# Patient Record
Sex: FEMALE | Race: BLACK OR AFRICAN AMERICAN | Employment: PART TIME | URBAN - METROPOLITAN AREA
[De-identification: names, ages, dates, MRNs, and addresses within clinical notes are randomized per-mention and may not be internally consistent; named-entity substitution may affect disease eponyms.]

---

## 2017-07-09 ENCOUNTER — HOSPITAL ENCOUNTER (EMERGENCY)
Age: 27
Discharge: HOME | End: 2017-07-09
Attending: EMERGENCY MEDICINE
Payer: COMMERCIAL

## 2017-07-09 VITALS
TEMPERATURE: 98.9 F | OXYGEN SATURATION: 99 % | HEART RATE: 98 BPM | HEIGHT: 67 IN | DIASTOLIC BLOOD PRESSURE: 89 MMHG | RESPIRATION RATE: 16 BRPM | BODY MASS INDEX: 39.24 KG/M2 | SYSTOLIC BLOOD PRESSURE: 138 MMHG | WEIGHT: 250 LBS

## 2017-07-09 DIAGNOSIS — L29.9 ITCHING: ICD-10-CM

## 2017-07-09 DIAGNOSIS — Z91.040 LATEX ALLERGY: Primary | ICD-10-CM

## 2017-07-09 PROCEDURE — 6370000000 HC RX 637 (ALT 250 FOR IP): Performed by: PHYSICIAN ASSISTANT

## 2017-07-09 PROCEDURE — G0382 LEV 3 HOSP TYPE B ED VISIT: HCPCS

## 2017-07-09 RX ORDER — DIPHENHYDRAMINE HCL 25 MG
25 CAPSULE ORAL EVERY 4 HOURS PRN
Qty: 15 CAPSULE | Refills: 0 | Status: SHIPPED | OUTPATIENT
Start: 2017-07-09 | End: 2020-08-11 | Stop reason: SDUPTHER

## 2017-07-09 RX ORDER — LORATADINE 10 MG/1
10 TABLET ORAL DAILY
Qty: 30 TABLET | Refills: 0 | Status: SHIPPED | OUTPATIENT
Start: 2017-07-09 | End: 2017-09-11

## 2017-07-09 RX ORDER — DIAPER,BRIEF,INFANT-TODD,DISP
EACH MISCELLANEOUS
Qty: 1 TUBE | Refills: 1 | Status: SHIPPED | OUTPATIENT
Start: 2017-07-09 | End: 2017-07-16

## 2017-07-09 RX ORDER — DIPHENHYDRAMINE HCL 25 MG
25 TABLET ORAL ONCE
Status: COMPLETED | OUTPATIENT
Start: 2017-07-09 | End: 2017-07-09

## 2017-07-09 RX ADMIN — DIPHENHYDRAMINE HCL 25 MG: 25 TABLET ORAL at 15:28

## 2017-07-09 ASSESSMENT — ENCOUNTER SYMPTOMS
ABDOMINAL PAIN: 0
BACK PAIN: 0
TROUBLE SWALLOWING: 0
COUGH: 0
VOMITING: 0
NAUSEA: 0
COLOR CHANGE: 0
SHORTNESS OF BREATH: 0
WHEEZING: 0
DIARRHEA: 0

## 2017-09-11 ENCOUNTER — HOSPITAL ENCOUNTER (EMERGENCY)
Age: 27
Discharge: HOME | End: 2017-09-11
Attending: EMERGENCY MEDICINE
Payer: COMMERCIAL

## 2017-09-11 VITALS
WEIGHT: 287.2 LBS | TEMPERATURE: 99.2 F | OXYGEN SATURATION: 100 % | BODY MASS INDEX: 45.08 KG/M2 | RESPIRATION RATE: 20 BRPM | DIASTOLIC BLOOD PRESSURE: 67 MMHG | SYSTOLIC BLOOD PRESSURE: 137 MMHG | HEART RATE: 80 BPM | HEIGHT: 67 IN

## 2017-09-11 DIAGNOSIS — J06.9 VIRAL URI WITH COUGH: Primary | ICD-10-CM

## 2017-09-11 PROCEDURE — 99282 EMERGENCY DEPT VISIT SF MDM: CPT

## 2017-09-11 RX ORDER — LORATADINE 10 MG/1
10 TABLET ORAL DAILY
Qty: 30 TABLET | Refills: 0 | Status: SHIPPED | OUTPATIENT
Start: 2017-09-11 | End: 2019-11-25 | Stop reason: ALTCHOICE

## 2017-09-11 RX ORDER — ALBUTEROL SULFATE 90 UG/1
2 AEROSOL, METERED RESPIRATORY (INHALATION) EVERY 6 HOURS PRN
Qty: 1 INHALER | Refills: 1 | Status: SHIPPED | OUTPATIENT
Start: 2017-09-11 | End: 2020-08-11 | Stop reason: SDUPTHER

## 2017-09-11 RX ORDER — GUAIFENESIN 600 MG/1
1200 TABLET, EXTENDED RELEASE ORAL 2 TIMES DAILY
Qty: 30 TABLET | Refills: 0 | Status: SHIPPED | OUTPATIENT
Start: 2017-09-11 | End: 2020-08-11

## 2017-09-11 RX ORDER — OXYMETAZOLINE HYDROCHLORIDE 0.05 G/100ML
2 SPRAY NASAL 2 TIMES DAILY
Qty: 1 BOTTLE | Refills: 0 | Status: SHIPPED | OUTPATIENT
Start: 2017-09-11 | End: 2017-10-11

## 2017-09-11 RX ORDER — IBUPROFEN 800 MG/1
800 TABLET ORAL EVERY 6 HOURS PRN
Qty: 120 TABLET | Refills: 1 | Status: SHIPPED | OUTPATIENT
Start: 2017-09-11 | End: 2018-12-26

## 2017-09-11 ASSESSMENT — ENCOUNTER SYMPTOMS
STRIDOR: 0
COUGH: 1
ABDOMINAL PAIN: 0
WHEEZING: 0
APNEA: 0
ABDOMINAL DISTENTION: 0
SHORTNESS OF BREATH: 0

## 2018-03-11 ENCOUNTER — HOSPITAL ENCOUNTER (EMERGENCY)
Age: 28
Discharge: HOME | End: 2018-03-11
Attending: EMERGENCY MEDICINE
Payer: COMMERCIAL

## 2018-03-11 VITALS
DIASTOLIC BLOOD PRESSURE: 81 MMHG | BODY MASS INDEX: 45.42 KG/M2 | OXYGEN SATURATION: 100 % | SYSTOLIC BLOOD PRESSURE: 131 MMHG | HEART RATE: 96 BPM | WEIGHT: 290 LBS | TEMPERATURE: 97.7 F | RESPIRATION RATE: 16 BRPM

## 2018-03-11 DIAGNOSIS — N76.0 ABSCESS OF VAGINA: Primary | ICD-10-CM

## 2018-03-11 PROCEDURE — 6370000000 HC RX 637 (ALT 250 FOR IP): Performed by: EMERGENCY MEDICINE

## 2018-03-11 PROCEDURE — 56405 I&D VULVA/PERINEAL ABSCESS: CPT

## 2018-03-11 PROCEDURE — 99283 EMERGENCY DEPT VISIT LOW MDM: CPT

## 2018-03-11 RX ORDER — SULFAMETHOXAZOLE AND TRIMETHOPRIM 800; 160 MG/1; MG/1
1 TABLET ORAL 2 TIMES DAILY
Qty: 14 TABLET | Refills: 0 | Status: SHIPPED | OUTPATIENT
Start: 2018-03-11 | End: 2018-03-18

## 2018-03-11 RX ORDER — HYDROCODONE BITARTRATE AND ACETAMINOPHEN 5; 325 MG/1; MG/1
1 TABLET ORAL EVERY 6 HOURS PRN
Qty: 10 TABLET | Refills: 0 | Status: SHIPPED | OUTPATIENT
Start: 2018-03-11 | End: 2018-03-18

## 2018-03-11 RX ORDER — CEPHALEXIN 500 MG/1
500 CAPSULE ORAL 4 TIMES DAILY
Qty: 28 CAPSULE | Refills: 0 | Status: SHIPPED | OUTPATIENT
Start: 2018-03-11 | End: 2018-03-18

## 2018-03-11 RX ORDER — CEPHALEXIN 500 MG/1
500 CAPSULE ORAL ONCE
Status: COMPLETED | OUTPATIENT
Start: 2018-03-11 | End: 2018-03-11

## 2018-03-11 RX ORDER — SULFAMETHOXAZOLE AND TRIMETHOPRIM 800; 160 MG/1; MG/1
1 TABLET ORAL ONCE
Status: COMPLETED | OUTPATIENT
Start: 2018-03-11 | End: 2018-03-11

## 2018-03-11 RX ADMIN — SULFAMETHOXAZOLE AND TRIMETHOPRIM 1 TABLET: 800; 160 TABLET ORAL at 19:45

## 2018-03-11 RX ADMIN — CEPHALEXIN 500 MG: 500 CAPSULE ORAL at 19:45

## 2018-03-11 ASSESSMENT — ENCOUNTER SYMPTOMS
DIARRHEA: 0
SORE THROAT: 0
NAUSEA: 0
BACK PAIN: 0
SHORTNESS OF BREATH: 0
ABDOMINAL PAIN: 0
VOMITING: 0
CONSTIPATION: 0
BLOOD IN STOOL: 0
SINUS PRESSURE: 0
COUGH: 0
RHINORRHEA: 0

## 2018-03-11 ASSESSMENT — PAIN DESCRIPTION - PAIN TYPE: TYPE: ACUTE PAIN

## 2018-03-11 ASSESSMENT — PAIN DESCRIPTION - LOCATION: LOCATION: LABIA

## 2018-03-11 ASSESSMENT — PAIN SCALES - GENERAL: PAINLEVEL_OUTOF10: 10

## 2018-03-11 ASSESSMENT — PAIN DESCRIPTION - ORIENTATION: ORIENTATION: RIGHT

## 2018-03-11 NOTE — ED PROVIDER NOTES
[facility] ED  Emergency Department Encounter  Emergency Medicine Resident     Pt Name: Marta Borjas  MRN: [id number]  [place] 1990  Date of evaluation: 3/11/18  PCP:  Mariposa Carrillo MD    CHIEF COMPLAINT       Chief Complaint   Patient presents with    Skin Problem     right labial sore. HISTORY OF PRESENT ILLNESS  (Location/Symptom, Timing/Onset, Context/Setting, Quality, Duration, Modifying Factors, Severity.)      Marta Borjas is a 33 y.o. female who presents with Vaginal swelling. Patient states that she has a painful swelling on the right inferior side of her vagina doesn't present for 3 days. Patient states that it has been increasing in size over that time. Patient denies history of current symptoms. Patient states that she's tried to put hot towels on the swelling but is not relieved the symptoms. Patient states that there has been no drainage from the swelling. Patient states that she is not sexually active. Patient denies fever, chills, nausea, vomiting, abdominal pain him a vaginal discharge or bleeding, dysuria, hematuria. PAST MEDICAL / SURGICAL / SOCIAL / FAMILY HISTORY      has a past medical history of Asthma; Bipolar disorder, mixed ([address] Cannabis abuse; Depression; Diabetes mellitus ([facility] [address] Obesity; and Polycystic ovarian disease. has a past surgical history that includes Tonsillectomy and adenoidectomy and [place] tooth extraction. Social History     Social History    Marital status: Single     Spouse name: N/A    Number of children: N/A    Years of education: N/A     Occupational History    Not on file.      Social History Main Topics    Smoking status: Current Every Day Smoker     Packs/day: 0.25     Years: 3.00     Types: Cigarettes    Smokeless tobacco: Never Used      Comment: Accepting of Nicotine replacement: Patch     Alcohol use No      Comment: socially    Drug use: Yes     Types: Marijuana    Sexual activity: Not Currently     Partners: Female     Other Topics Concern    Not on file     Social History Narrative    No narrative on file       Family History   Problem Relation Age of Onset    High Blood Pressure Mother     Diabetes Maternal Grandfather     High Blood Pressure Maternal Grandfather     Schizophrenia Maternal Aunt     Bipolar Disorder Maternal Aunt     Schizophrenia Maternal Aunt        Allergies:  Latex    Home Medications:  Prior to Admission medications    Medication Sig Start Date End Date Taking? Authorizing Provider   cephALEXin (KEFLEX) 500 MG capsule Take 1 capsule by mouth 4 times daily for 7 days 3/11/18 3/18/18 Yes Nick Bauer,    sulfamethoxazole-trimethoprim (BACTRIM DS) 800-160 MG per tablet Take 1 tablet by mouth 2 times daily for 7 days 3/11/18 3/18/18 Yes Nick Bauer DO   HYDROcodone-acetaminophen (NORCO) 5-325 MG per tablet Take 1 tablet by mouth every 6 hours as needed for Pain for up to 7 days.  3/11/18 3/18/18 Yes Nick Bauer DO   albuterol sulfate HFA (VENTOLIN HFA) 108 (90 Base) MCG/ACT inhaler Inhale 2 puffs into the lungs every 6 hours as needed for Wheezing 9/11/17  Yes Alison Cardona MD   FLUoxetine (PROZAC) 40 MG capsule Take 1 capsule by mouth daily 1/8/17  Yes Robbie No MD   ziprasidone (GEODON) 40 MG capsule Take 1 capsule by mouth 2 times daily (with meals) 1/8/17  Yes Robbie No MD   traZODone (DESYREL) 50 MG tablet Take 1 tablet by mouth nightly as needed for Sleep 1/8/17  Yes Robbie No MD   ibuprofen (ADVIL;MOTRIN) 800 MG tablet Take 1 tablet by mouth every 6 hours as needed for Pain 9/11/17   Alison Cardona MD   loratadine (CLARITIN) 10 MG tablet Take 1 tablet by mouth daily 9/11/17   Alison Cardona MD   guaiFENesin [facility]) 600 MG extended release tablet Take 2 tablets by mouth 2 times daily 9/11/17   Alison Cardona MD   diphenhydrAMINE (BENADRYL) 25 MG capsule Take 1 capsule by mouth every 4 hours as needed for Itching 7/9/17 Lesion spontaneously drained more laterally with purulent discharge. A third incision was made where spontaneous drainage was. CONSULTS:  None    CRITICAL CARE:  None    FINAL IMPRESSION      1. Abscess of vagina          DISPOSITION / PLAN     DISPOSITION Decision To Discharge 03/11/2018 07:29:29 PM      PATIENT REFERRED TO:  [facility]  [address]  [facility] [address]  [phone]  Call in 1 week  for follow up. DISCHARGE MEDICATIONS:  Discharge Medication List as of 3/11/2018  7:32 PM      START taking these medications    Details   cephALEXin (KEFLEX) 500 MG capsule Take 1 capsule by mouth 4 times daily for 7 days, Disp-28 capsule, R-0Print      sulfamethoxazole-trimethoprim (BACTRIM DS) 800-160 MG per tablet Take 1 tablet by mouth 2 times daily for 7 days, Disp-14 tablet, R-0Print      HYDROcodone-acetaminophen (NORCO) 5-325 MG per tablet Take 1 tablet by mouth every 6 hours as needed for Pain for up to 7 days. , Disp-10 tablet, R-0Print             Rayray Loredo DO  Emergency Medicine Resident    (Please note that portions of this note were completed with a voice recognition program.  Efforts were made to edit the dictations but occasionally words are mis-transcribed.)     Rayray Loredo DO  Resident  03/11/18 2026

## 2018-03-11 NOTE — ED NOTES
Pt is ambulatory with steady gait to room 21. Pt reports sore to right labia for a few days. Pt denies vaginal discharge. She states that she's not sexually active. Pt is alert, oriented, speaking in full sentences.       Celester Goltz, RN  03/11/18 4457

## 2018-05-26 ENCOUNTER — HOSPITAL ENCOUNTER (EMERGENCY)
Age: 28
Discharge: HOME | End: 2018-05-26
Attending: EMERGENCY MEDICINE
Payer: COMMERCIAL

## 2018-05-26 ENCOUNTER — APPOINTMENT (OUTPATIENT)
Dept: GENERAL RADIOLOGY | Age: 28
End: 2018-05-26
Payer: COMMERCIAL

## 2018-05-26 VITALS
DIASTOLIC BLOOD PRESSURE: 101 MMHG | HEIGHT: 65 IN | OXYGEN SATURATION: 99 % | WEIGHT: 250 LBS | TEMPERATURE: 97.9 F | BODY MASS INDEX: 41.65 KG/M2 | HEART RATE: 98 BPM | RESPIRATION RATE: 16 BRPM | SYSTOLIC BLOOD PRESSURE: 136 MMHG

## 2018-05-26 DIAGNOSIS — J40 BRONCHITIS: Primary | ICD-10-CM

## 2018-05-26 DIAGNOSIS — R05.9 COUGH: ICD-10-CM

## 2018-05-26 PROCEDURE — 99283 EMERGENCY DEPT VISIT LOW MDM: CPT

## 2018-05-26 PROCEDURE — 71046 X-RAY EXAM CHEST 2 VIEWS: CPT

## 2018-05-26 RX ORDER — GUAIFENESIN, PSEUDOEPHEDRINE HYDROCHLORIDE 600; 60 MG/1; MG/1
1 TABLET, EXTENDED RELEASE ORAL EVERY 12 HOURS
Qty: 14 TABLET | Refills: 0 | Status: SHIPPED | OUTPATIENT
Start: 2018-05-26 | End: 2018-06-02

## 2018-05-26 RX ORDER — AZITHROMYCIN 250 MG/1
TABLET, FILM COATED ORAL
Qty: 1 PACKET | Refills: 0 | Status: SHIPPED | OUTPATIENT
Start: 2018-05-26 | End: 2018-06-05

## 2018-05-26 RX ORDER — FLUTICASONE PROPIONATE 50 MCG
1 SPRAY, SUSPENSION (ML) NASAL 2 TIMES DAILY
Qty: 1 BOTTLE | Refills: 0 | Status: SHIPPED | OUTPATIENT
Start: 2018-05-26 | End: 2020-08-11 | Stop reason: SDUPTHER

## 2018-05-26 RX ORDER — BENZONATATE 100 MG/1
100 CAPSULE ORAL 3 TIMES DAILY PRN
Qty: 30 CAPSULE | Refills: 0 | Status: SHIPPED | OUTPATIENT
Start: 2018-05-26 | End: 2018-06-02

## 2018-07-03 ENCOUNTER — HOSPITAL ENCOUNTER (EMERGENCY)
Age: 28
Discharge: HOME | End: 2018-07-03
Attending: EMERGENCY MEDICINE
Payer: COMMERCIAL

## 2018-07-03 VITALS
SYSTOLIC BLOOD PRESSURE: 169 MMHG | HEIGHT: 67 IN | WEIGHT: 230 LBS | RESPIRATION RATE: 19 BRPM | DIASTOLIC BLOOD PRESSURE: 87 MMHG | BODY MASS INDEX: 36.1 KG/M2 | OXYGEN SATURATION: 100 % | TEMPERATURE: 99.2 F | HEART RATE: 98 BPM

## 2018-07-03 DIAGNOSIS — R03.0 ELEVATED BLOOD-PRESSURE READING WITHOUT DIAGNOSIS OF HYPERTENSION: ICD-10-CM

## 2018-07-03 DIAGNOSIS — R07.81 PLEURITIC CHEST PAIN: ICD-10-CM

## 2018-07-03 DIAGNOSIS — J20.9 ACUTE BRONCHITIS, UNSPECIFIED ORGANISM: Primary | ICD-10-CM

## 2018-07-03 DIAGNOSIS — J45.41 MODERATE PERSISTENT ASTHMA WITH EXACERBATION: ICD-10-CM

## 2018-07-03 DIAGNOSIS — J06.9 ACUTE UPPER RESPIRATORY INFECTION: ICD-10-CM

## 2018-07-03 PROCEDURE — 6370000000 HC RX 637 (ALT 250 FOR IP): Performed by: PHYSICIAN ASSISTANT

## 2018-07-03 PROCEDURE — 99283 EMERGENCY DEPT VISIT LOW MDM: CPT

## 2018-07-03 RX ORDER — PREDNISONE 20 MG/1
60 TABLET ORAL ONCE
Status: COMPLETED | OUTPATIENT
Start: 2018-07-03 | End: 2018-07-03

## 2018-07-03 RX ORDER — PREDNISONE 50 MG/1
50 TABLET ORAL DAILY
Qty: 4 TABLET | Refills: 0 | Status: SHIPPED | OUTPATIENT
Start: 2018-07-03 | End: 2019-07-15

## 2018-07-03 RX ORDER — ALBUTEROL SULFATE 90 UG/1
1-2 AEROSOL, METERED RESPIRATORY (INHALATION) EVERY 4 HOURS PRN
Qty: 1 INHALER | Refills: 0 | Status: SHIPPED | OUTPATIENT
Start: 2018-07-03 | End: 2020-08-11

## 2018-07-03 RX ADMIN — PREDNISONE 60 MG: 20 TABLET ORAL at 18:32

## 2018-07-03 ASSESSMENT — ENCOUNTER SYMPTOMS
ABDOMINAL PAIN: 0
WHEEZING: 0
EYE ITCHING: 0
NAUSEA: 0
EYE PAIN: 0
CONSTIPATION: 0
DIARRHEA: 0
VOMITING: 0
VOICE CHANGE: 0
RHINORRHEA: 1
COUGH: 1
SHORTNESS OF BREATH: 1
EYE DISCHARGE: 0
SINUS PAIN: 0
ABDOMINAL DISTENTION: 0
SINUS PRESSURE: 1
CHEST TIGHTNESS: 0
APNEA: 0
SORE THROAT: 0

## 2018-07-03 NOTE — ED PROVIDER NOTES
[facility] ED  Emergency Department Encounter  Emergency Medicine Resident     Pt Name: Kenny Clancy  MRN: [id number]  [place] 1990  Date of evaluation: 7/3/18  PCP:  Hannah Montenegro MD    [address] [place] [address]       Chief Complaint   Patient presents with    Cough     green production x4 days with congestion        HISTORY OF PRESENT ILLNESS  (Location/Symptom, Timing/Onset, Context/Setting, Quality, Duration, Modifying Factors, Severity.)      Kenny Clancy is a 33 y.o. female who presents with Productive cough, nasal and sinus congestion x 4-5 days. Patient reports occasional yellow-green sputum, without hemoptysis. She has occasional shortness of breath which is relieved with her albuterol inhaler she also reports anterior chest wall pain that is present with coughing. She typically uses her albuterol inhaler for exercise-induced asthma symptoms, but states that she has used it 3 times a day last time being 2 hours prior to arrival.  She denies shortness of breath currently and furthermore states that her symptoms are not similar to her prior asthma exacerbations. She denies any sore throat, fever, chills, weakness, palpitations, headache, nausea, vomiting, or diarrhea. She notes positive sick contacts at home with similar symptoms. She has not taken any over-the-counter or prescribed medications for her symptoms. She also notes that she needs a work note so that she doesn't have to work tomorrow. PAST MEDICAL / SURGICAL / SOCIAL / FAMILY HISTORY      has a past medical history of Asthma; Bipolar disorder, mixed ([address] Cannabis abuse; Depression; Diabetes mellitus ([address] Obesity; and Polycystic ovarian disease. has a past surgical history that includes Tonsillectomy and adenoidectomy and [place] tooth extraction.     Social History     Social History    Marital status: Single     Spouse name: N/A    Number of children: N/A    Years of education: N/A     Occupational 25 MG capsule Take 1 capsule by mouth every 4 hours as needed for Itching 7/9/17   Heather Gonzalez PA-C   FLUoxetine (PROZAC) 40 MG capsule Take 1 capsule by mouth daily 1/8/17   Tobias Daley MD   nicotine polacrilex (NICORETTE) 2 MG gum Take 1 each by mouth as needed for Smoking cessation 1/8/17   Tobias Daley MD   ziprasidone (GEODON) 40 MG capsule Take 1 capsule by mouth 2 times daily (with meals) 1/8/17   Tobias Daley MD   traZODone (DESYREL) 50 MG tablet Take 1 tablet by mouth nightly as needed for Sleep 1/8/17   Tobias Daley MD       REVIEW OF SYSTEMS    (2-9 systems for level 4, 10 or more for level 5)      Review of Systems   Constitutional: Negative for activity change, appetite change, chills and diaphoresis. HENT: Positive for congestion, postnasal drip, rhinorrhea, sinus pressure and sneezing. Negative for ear discharge, mouth sores, nosebleeds, sinus pain, sore throat and voice change. Eyes: Negative for pain, discharge and itching. Respiratory: Positive for cough and shortness of breath. Negative for apnea, chest tightness and wheezing. Cardiovascular: Positive for chest pain. Negative for palpitations and leg swelling. Gastrointestinal: Negative for abdominal distention, abdominal pain, constipation, diarrhea, nausea and vomiting. Musculoskeletal: Negative for arthralgias. All other systems reviewed and are negative. PHYSICAL EXAM   (up to 7 for level 4, 8 or more for level 5)      INITIAL VITALS:   ED Triage Vitals   BP Temp Temp Source Pulse Resp SpO2 Height Weight   07/03/18 1706 07/03/18 1704 07/03/18 1704 07/03/18 1704 07/03/18 1704 07/03/18 1704 07/03/18 1704 07/03/18 1704   (!) 169/87 99.2 °F (37.3 °C) Oral 98 19 100 % 5' 7\" (1.702 m) 230 lb (104.3 kg)       Physical Exam   Constitutional: She is oriented to person, place, and time. She appears well-developed. No distress. Morbidly obese   HENT:   Head: Normocephalic and atraumatic.    Right Ear: External ear normal.   Left

## 2018-11-30 ENCOUNTER — OFFICE VISIT (OUTPATIENT)
Dept: INTERNAL MEDICINE | Age: 28
End: 2018-11-30
Payer: COMMERCIAL

## 2018-11-30 VITALS
BODY MASS INDEX: 45.99 KG/M2 | DIASTOLIC BLOOD PRESSURE: 94 MMHG | WEIGHT: 293 LBS | HEIGHT: 67 IN | SYSTOLIC BLOOD PRESSURE: 138 MMHG | HEART RATE: 107 BPM

## 2018-11-30 DIAGNOSIS — I10 ESSENTIAL HYPERTENSION: ICD-10-CM

## 2018-11-30 DIAGNOSIS — R73.03 PREDIABETES: ICD-10-CM

## 2018-11-30 DIAGNOSIS — J45.20 MILD INTERMITTENT ASTHMA WITHOUT COMPLICATION: ICD-10-CM

## 2018-11-30 DIAGNOSIS — Z00.00 HEALTH CARE MAINTENANCE: ICD-10-CM

## 2018-11-30 DIAGNOSIS — Z13.220 SCREENING FOR HYPERLIPIDEMIA: ICD-10-CM

## 2018-11-30 DIAGNOSIS — G47.33 OSA (OBSTRUCTIVE SLEEP APNEA): Primary | ICD-10-CM

## 2018-11-30 DIAGNOSIS — E11.9 TYPE 2 DIABETES MELLITUS WITHOUT COMPLICATION, WITHOUT LONG-TERM CURRENT USE OF INSULIN (HCC): ICD-10-CM

## 2018-11-30 DIAGNOSIS — E66.01 CLASS 3 SEVERE OBESITY DUE TO EXCESS CALORIES WITHOUT SERIOUS COMORBIDITY WITH BODY MASS INDEX (BMI) OF 50.0 TO 59.9 IN ADULT (HCC): ICD-10-CM

## 2018-11-30 LAB — HBA1C MFR BLD: 6 %

## 2018-11-30 PROCEDURE — 99214 OFFICE O/P EST MOD 30 MIN: CPT | Performed by: INTERNAL MEDICINE

## 2018-11-30 PROCEDURE — 83036 HEMOGLOBIN GLYCOSYLATED A1C: CPT | Performed by: INTERNAL MEDICINE

## 2018-11-30 PROCEDURE — G8484 FLU IMMUNIZE NO ADMIN: HCPCS | Performed by: INTERNAL MEDICINE

## 2018-11-30 PROCEDURE — 3044F HG A1C LEVEL LT 7.0%: CPT | Performed by: INTERNAL MEDICINE

## 2018-11-30 PROCEDURE — G8417 CALC BMI ABV UP PARAM F/U: HCPCS | Performed by: INTERNAL MEDICINE

## 2018-11-30 PROCEDURE — 99211 OFF/OP EST MAY X REQ PHY/QHP: CPT | Performed by: INTERNAL MEDICINE

## 2018-11-30 PROCEDURE — G8427 DOCREV CUR MEDS BY ELIG CLIN: HCPCS | Performed by: INTERNAL MEDICINE

## 2018-11-30 PROCEDURE — 4004F PT TOBACCO SCREEN RCVD TLK: CPT | Performed by: INTERNAL MEDICINE

## 2018-11-30 PROCEDURE — 2022F DILAT RTA XM EVC RTNOPTHY: CPT | Performed by: INTERNAL MEDICINE

## 2018-11-30 RX ORDER — HYDROCHLOROTHIAZIDE 25 MG/1
25 TABLET ORAL DAILY
Qty: 30 TABLET | Refills: 3 | Status: SHIPPED | OUTPATIENT
Start: 2018-11-30 | End: 2020-08-11

## 2018-11-30 ASSESSMENT — PATIENT HEALTH QUESTIONNAIRE - PHQ9
SUM OF ALL RESPONSES TO PHQ QUESTIONS 1-9: 0
2. FEELING DOWN, DEPRESSED OR HOPELESS: 0
1. LITTLE INTEREST OR PLEASURE IN DOING THINGS: 0
SUM OF ALL RESPONSES TO PHQ QUESTIONS 1-9: 0
SUM OF ALL RESPONSES TO PHQ9 QUESTIONS 1 & 2: 0

## 2018-11-30 NOTE — PATIENT INSTRUCTIONS
Follow-up appointment scheduled for 1/8/19, AVS given to patient. Your doctor has ordered fasting blood work. It can be done at [facility] or at the hospital. Beaulah Males will need to fast for 12 hours and bring order with you to registration department.    -Order for Sleep Study faxed to [facility] sleep center, information given to pt. Sleep center will contact them to set up an appt. Referrals for Bariatrics and Weight Management given to patient.     Cleopatra Hdez

## 2018-11-30 NOTE — PROGRESS NOTES
[facility] PHYSICIANS  [facility] [address] [facility]  [address]  Dept: [phone]      Today's Date: 11/30/2018  Patient Name: Author Enriqeuz  Patient's age: 33 y. o., 1990        CHIEF COMPLAINT:    Chief Complaint   Patient presents with    New Patient    Health Maintenance     labs pending. vaccines due. Vaccines declined       History Obtained From:  patient    HISTORY OF PRESENT ILLNESS:      The patient is a 33 y.o. old  female and is here to Establish care. She has elevated blood pressure. She has never been treated for hypertension. Denies any headache, chest pain or palpitation. She is morbidly obese and has tried exercise and diet controlled without much benefit. She is willing to go for weight management program.  She complains of snoring, nighttime awakening and choking spells and daytime sleepiness and fatigue. She likely has sleeps apnea will order a sleep study. She declines flu vaccination. Her A1c today is 6.0. She is not on any medication. At one point her A1c was 6.8 and she was on metformin which she has stopped several months back. We'll continue to monitor A1c every 4-6 months. Patient Active Problem List   Diagnosis    Asthma    Depression    Obesity    Abnormal uterine bleeding - likely anovulatory    Seasonal allergies    Viral URI    Diabetes mellitus ([address]    Bipolar disorder, mixed ([address]    Cannabis abuse       Past Medical History:   has a past medical history of Asthma; Bipolar disorder, mixed ([address] Cannabis abuse; Depression; Diabetes mellitus ([address] Obesity; and Polycystic ovarian disease. Past Surgical History:   has a past surgical history that includes Tonsillectomy and adenoidectomy and [place] tooth extraction.      Medications:    Current Outpatient Prescriptions   Medication Sig Dispense Refill    FLUoxetine (PROZAC) 40 MG capsule Take 1 capsule by mouth daily 30 capsule 0    in the last 72 hours. Lab Results   Component Value Date    WBC 9.6 12/24/2016    HGB 12.6 12/24/2016    HCT 37.5 12/24/2016    MCV 82.6 12/24/2016     12/24/2016     Lab Results   Component Value Date    TSH 0.75 07/01/2015     Lab Results   Component Value Date    LABA1C 5.6 07/01/2015     Lab Results   Component Value Date    LABMICR 201 11/05/2013     No components found for: CHLPL  Lab Results   Component Value Date    TRIG 180 (H) 11/05/2013     Lab Results   Component Value Date    HDL 44 11/05/2013     Lab Results   Component Value Date    LDLCHOLESTEROL 112 (H) 11/05/2013     The ASCVD Risk score (Viviana Cárdenas, et al., 2013) failed to calculate for the following reasons: The 2013 ASCVD risk score is only valid for ages 36 to 78    Health Maintenance Due   Topic Date Due    Diabetic foot exam  01/26/2001    HIV screen  01/26/2006    Pneumococcal med risk (1 of 1 - PPSV23) 01/26/2010    Cervical cancer screen  01/26/2012    Diabetic microalbuminuria test  11/05/2014    Lipid screen  11/05/2014    A1C test (Diabetic or Prediabetic)  07/01/2016    Diabetic retinal exam  11/24/2016    Flu vaccine (1) 09/01/2018        ASSESSMENT AND PLAN    1. SHOBHA (obstructive sleep apnea)    - Sleep Study with PAP Titration; Future  - [address] [facility] Diagnostic Sleep Study; Future    2. Essential hypertension    - CBC Auto Differential; Future  - Comprehensive Metabolic Panel; Future  - TSH with Reflex; Future  - hydrochlorothiazide (HYDRODIURIL) 25 MG tablet; Take 1 tablet by mouth daily  Dispense: 30 tablet; Refill: 3    3. Screening for hyperlipidemia    - Lipid Panel; Future    4. Class 3 severe obesity due to excess calories without serious comorbidity with body mass index (BMI) of 50.0 to 59.9 in adult [facility])    - Nelia Monteiro DO, Weight Management and [address] [place] [address] Blvd    5. Prediabetes      6.  Type 2 diabetes mellitus without complication, without long-term current use of insulin (HCC)    -  DIABETES FOOT EXAM  - POCT glycosylated hemoglobin (Hb A1C)  - Microalbumin, Ur; Future  - Lipid Panel; Future    7. Health care maintenance    - HIV Screen; Future      · Start HCTZ 25 daily   · Ref for sleep study - Snoring, night time awakening, day time sleepiness present   · Labs as ordered  ·  refer to weight management program   · A1c Q4-6 months  · Kourtney received counseling on the following healthy behaviors: exercise and medication adherence  · Discussed use, benefit, and side effects of prescribed medications. Barriers to medication compliance addressed. All patient questions answered. Pt voiced understanding. · The return visit should be in 6 weeks      Debbi Griffin MD  Attending and Faculty Internal Medicine  [facility] PHYSICIANS  [place] [address] [facility]  [address] [place] [address]  Dept: [phone]  11/30/18      This note is created with the assistance of a speech-recognition program. While intending to generate a document that actually reflects the content of the visit, the document can still have some mistakes which may not have been identified and corrected by editing.

## 2018-12-05 ENCOUNTER — HOSPITAL ENCOUNTER (OUTPATIENT)
Dept: SLEEP CENTER | Age: 28
Discharge: HOME | End: 2018-12-07
Payer: MEDICARE

## 2018-12-05 DIAGNOSIS — G47.33 OSA (OBSTRUCTIVE SLEEP APNEA): ICD-10-CM

## 2018-12-05 PROCEDURE — 95810 POLYSOM 6/> YRS 4/> PARAM: CPT

## 2018-12-11 ENCOUNTER — HOSPITAL ENCOUNTER (OUTPATIENT)
Age: 28
Setting detail: SPECIMEN
Discharge: HOME | End: 2018-12-11
Payer: MEDICARE

## 2018-12-11 ENCOUNTER — OFFICE VISIT (OUTPATIENT)
Dept: OBGYN | Age: 28
End: 2018-12-11
Payer: MEDICARE

## 2018-12-11 VITALS
DIASTOLIC BLOOD PRESSURE: 79 MMHG | BODY MASS INDEX: 54.97 KG/M2 | SYSTOLIC BLOOD PRESSURE: 116 MMHG | HEART RATE: 91 BPM | WEIGHT: 293 LBS

## 2018-12-11 DIAGNOSIS — Z01.419 WOMEN'S ANNUAL ROUTINE GYNECOLOGICAL EXAMINATION: Primary | ICD-10-CM

## 2018-12-11 DIAGNOSIS — Z01.419 WOMEN'S ANNUAL ROUTINE GYNECOLOGICAL EXAMINATION: ICD-10-CM

## 2018-12-11 DIAGNOSIS — N92.6 IRREGULAR MENSES: ICD-10-CM

## 2018-12-11 LAB
CONTROL: PRESENT
PREGNANCY TEST URINE, POC: NEGATIVE

## 2018-12-11 PROCEDURE — G0101 CA SCREEN;PELVIC/BREAST EXAM: HCPCS | Performed by: STUDENT IN AN ORGANIZED HEALTH CARE EDUCATION/TRAINING PROGRAM

## 2018-12-11 PROCEDURE — 81025 URINE PREGNANCY TEST: CPT | Performed by: STUDENT IN AN ORGANIZED HEALTH CARE EDUCATION/TRAINING PROGRAM

## 2018-12-11 PROCEDURE — 99212 OFFICE O/P EST SF 10 MIN: CPT | Performed by: STUDENT IN AN ORGANIZED HEALTH CARE EDUCATION/TRAINING PROGRAM

## 2018-12-11 PROCEDURE — G8484 FLU IMMUNIZE NO ADMIN: HCPCS | Performed by: STUDENT IN AN ORGANIZED HEALTH CARE EDUCATION/TRAINING PROGRAM

## 2018-12-11 NOTE — PROGRESS NOTES
Orders   1. Women's annual routine gynecological examination  PAP Smear    Vaginitis DNA Probe    Chlamydia Trachomatis & Neisseria gonorrhoeae (GC) by amplified detection   2. Amenorrhea  POCT urine pregnancy            Chief Complaint   Patient presents with    Gynecologic Exam          Past Medical History:   Diagnosis Date    Asthma     Has not needed inhaler since 2009    B[facility] disorder, mixed ([facility]     Cannabis abuse 12/31/2016    Depression     Diabetes mellitus ([facility] [address] 11/20/2013    borderline    Obesity 8/1/2013    Polycystic ovarian disease          Patient Active Problem List    Diagnosis Date Noted    Abnormal uterine bleeding - likely anovulatory 09/17/2013     Priority: High     TV US and labs pending 9/17  TVUS suggest PCOS      Obesity 08/01/2013     Priority: High    Asthma 08/29/2012     Priority: Medium    Seasonal allergies 10/03/2013     Priority: Low    Depression 08/29/2012     Priority: Low     Pt reports hx of depression with auditory hallucinations      Type 2 diabetes mellitus without complication, without long-term current use of insulin ([facility] 11/30/2018    Cannabis abuse 12/31/2016    Bipolar disorder, mixed ([facility] [address] [facility] 12/07/2016    Diabetes mellitus ([facility] [address] [facility] 11/20/2013          Hereditary Breast, Ovarian, Colon and Uterine Cancer screening Done. Tobacco & Secondary smoke risks reviewed; instructed on cessation and avoidance      Counseling Completed: The patient was seen and counseled on all sexually transmitted diseases their symptoms and treatments. Barrier recommendations were made. The patient was made aware of all testing options available to her. PLAN:  Return in about 4 weeks (around 1/8/2019) for IUD insertion.    Discussed option for regulation of menses  Patient desires IUD for Irregular menses secondary to PCOS  Miconazole Rx for suspected yeast vaginitis  ASK about Gardasil at next visit  Repeat Annual every 1 year  Cervical Cytology

## 2018-12-12 LAB
DIRECT EXAM: ABNORMAL
Lab: ABNORMAL
SPECIMEN DESCRIPTION: ABNORMAL
STATUS: ABNORMAL

## 2018-12-13 LAB
C TRACH DNA GENITAL QL NAA+PROBE: NEGATIVE
N. GONORRHOEAE DNA: NEGATIVE

## 2018-12-15 ENCOUNTER — HOSPITAL ENCOUNTER (OUTPATIENT)
Dept: SLEEP CENTER | Age: 28
Discharge: HOME | End: 2018-12-17
Payer: MEDICARE

## 2018-12-15 DIAGNOSIS — G47.33 OSA (OBSTRUCTIVE SLEEP APNEA): ICD-10-CM

## 2018-12-15 PROCEDURE — 95811 POLYSOM 6/>YRS CPAP 4/> PARM: CPT

## 2018-12-17 ENCOUNTER — OFFICE VISIT (OUTPATIENT)
Dept: BARIATRICS/WEIGHT MGMT | Age: 28
End: 2018-12-17
Payer: MEDICARE

## 2018-12-17 VITALS
BODY MASS INDEX: 44.41 KG/M2 | WEIGHT: 293 LBS | SYSTOLIC BLOOD PRESSURE: 122 MMHG | HEART RATE: 76 BPM | HEIGHT: 68 IN | DIASTOLIC BLOOD PRESSURE: 70 MMHG | RESPIRATION RATE: 22 BRPM

## 2018-12-17 DIAGNOSIS — E66.01 MORBID OBESITY WITH BMI OF 50.0-59.9, ADULT (HCC): ICD-10-CM

## 2018-12-17 DIAGNOSIS — F41.9 ANXIETY AND DEPRESSION: ICD-10-CM

## 2018-12-17 DIAGNOSIS — F31.60 BIPOLAR DISORDER, MIXED (HCC): ICD-10-CM

## 2018-12-17 DIAGNOSIS — R73.03 PREDIABETES: ICD-10-CM

## 2018-12-17 DIAGNOSIS — F32.A ANXIETY AND DEPRESSION: ICD-10-CM

## 2018-12-17 DIAGNOSIS — E28.2 PCOS (POLYCYSTIC OVARIAN SYNDROME): Primary | ICD-10-CM

## 2018-12-17 DIAGNOSIS — J45.909 UNCOMPLICATED ASTHMA, UNSPECIFIED ASTHMA SEVERITY, UNSPECIFIED WHETHER PERSISTENT: ICD-10-CM

## 2018-12-17 PROBLEM — E11.69 DIABETES MELLITUS TYPE 2 IN OBESE (HCC): Status: ACTIVE | Noted: 2018-11-30

## 2018-12-17 PROBLEM — E66.9 DIABETES MELLITUS TYPE 2 IN OBESE (HCC): Status: ACTIVE | Noted: 2018-11-30

## 2018-12-17 PROCEDURE — G8417 CALC BMI ABV UP PARAM F/U: HCPCS | Performed by: NURSE PRACTITIONER

## 2018-12-17 PROCEDURE — G8427 DOCREV CUR MEDS BY ELIG CLIN: HCPCS | Performed by: NURSE PRACTITIONER

## 2018-12-17 PROCEDURE — 4004F PT TOBACCO SCREEN RCVD TLK: CPT | Performed by: NURSE PRACTITIONER

## 2018-12-17 PROCEDURE — 99204 OFFICE O/P NEW MOD 45 MIN: CPT | Performed by: NURSE PRACTITIONER

## 2018-12-17 PROCEDURE — G8484 FLU IMMUNIZE NO ADMIN: HCPCS | Performed by: NURSE PRACTITIONER

## 2018-12-17 RX ORDER — HYDROXYZINE PAMOATE 50 MG/1
CAPSULE ORAL
Refills: 0 | COMMUNITY
Start: 2018-12-03 | End: 2020-08-11

## 2018-12-19 NOTE — PROGRESS NOTES
History    Marital status: Single     Spouse name: N/A    Number of children: N/A    Years of education: N/A     Occupational History    Not on file. Social History Main Topics    Smoking status: Current Every Day Smoker     Packs/day: 0.25     Years: 3.00     Types: Cigarettes    Smokeless tobacco: Never Used      Comment: Accepting of Nicotine replacement: Patch     Alcohol use No      Comment: socially    Drug use: No    Sexual activity: Not Currently     Partners: Female, Male     Other Topics Concern    Not on file     Social History Narrative    No narrative on file       Current Medications:  Current Outpatient Prescriptions   Medication Sig Dispense Refill    miconazole (MICONAZOLE 7) 2 % vaginal cream Place vaginally nightly. 1 Tube 1    albuterol sulfate HFA (PROVENTIL HFA) 108 (90 Base) MCG/ACT inhaler Inhale 1-2 puffs into the lungs every 4 hours as needed for Wheezing or Shortness of Breath (Space out to every 6 hours as symptoms improve) Space out to every 6 hours as symptoms improve.  1 Inhaler 0    fluticasone (FLONASE) 50 MCG/ACT nasal spray 1 spray by Nasal route 2 times daily 1 Bottle 0    loratadine (CLARITIN) 10 MG tablet Take 1 tablet by mouth daily 30 tablet 0    guaiFENesin (MUCINEX) 600 MG extended release tablet Take 2 tablets by mouth 2 times daily 30 tablet 0    diphenhydrAMINE (BENADRYL) 25 MG capsule Take 1 capsule by mouth every 4 hours as needed for Itching 15 capsule 0    FLUoxetine (PROZAC) 40 MG capsule Take 1 capsule by mouth daily 30 capsule 0    ziprasidone (GEODON) 40 MG capsule Take 1 capsule by mouth 2 times daily (with meals) 60 capsule 0    traZODone (DESYREL) 50 MG tablet Take 1 tablet by mouth nightly as needed for Sleep 30 tablet 0    hydrOXYzine (VISTARIL) 50 MG capsule   0    hydrochlorothiazide (HYDRODIURIL) 25 MG tablet Take 1 tablet by mouth daily 30 tablet 3    predniSONE (DELTASONE) 50 MG tablet Take 1 tablet by mouth daily 4 tablet 0   

## 2018-12-26 ENCOUNTER — HOSPITAL ENCOUNTER (EMERGENCY)
Age: 28
Discharge: HOME | End: 2018-12-26
Attending: EMERGENCY MEDICINE
Payer: MEDICARE

## 2018-12-26 ENCOUNTER — APPOINTMENT (OUTPATIENT)
Dept: GENERAL RADIOLOGY | Age: 28
End: 2018-12-26
Payer: MEDICARE

## 2018-12-26 VITALS
WEIGHT: 293 LBS | OXYGEN SATURATION: 100 % | TEMPERATURE: 98.6 F | HEIGHT: 67 IN | BODY MASS INDEX: 45.99 KG/M2 | RESPIRATION RATE: 18 BRPM | DIASTOLIC BLOOD PRESSURE: 81 MMHG | HEART RATE: 89 BPM | SYSTOLIC BLOOD PRESSURE: 148 MMHG

## 2018-12-26 DIAGNOSIS — M79.671 RIGHT FOOT PAIN: Primary | ICD-10-CM

## 2018-12-26 LAB
CHP ED QC CHECK: NORMAL
PREGNANCY TEST URINE, POC: NEGATIVE

## 2018-12-26 PROCEDURE — 73630 X-RAY EXAM OF FOOT: CPT

## 2018-12-26 PROCEDURE — 6370000000 HC RX 637 (ALT 250 FOR IP): Performed by: NURSE PRACTITIONER

## 2018-12-26 PROCEDURE — 84703 CHORIONIC GONADOTROPIN ASSAY: CPT

## 2018-12-26 PROCEDURE — 99283 EMERGENCY DEPT VISIT LOW MDM: CPT

## 2018-12-26 RX ORDER — IBUPROFEN 800 MG/1
800 TABLET ORAL ONCE
Status: COMPLETED | OUTPATIENT
Start: 2018-12-26 | End: 2018-12-26

## 2018-12-26 RX ORDER — IBUPROFEN 800 MG/1
800 TABLET ORAL EVERY 8 HOURS PRN
Qty: 20 TABLET | Refills: 0 | Status: SHIPPED | OUTPATIENT
Start: 2018-12-26 | End: 2019-03-25

## 2018-12-26 RX ADMIN — IBUPROFEN 800 MG: 800 TABLET, FILM COATED ORAL at 15:19

## 2018-12-26 ASSESSMENT — PAIN SCALES - GENERAL: PAINLEVEL_OUTOF10: 10

## 2018-12-26 ASSESSMENT — PAIN DESCRIPTION - FREQUENCY: FREQUENCY: INTERMITTENT

## 2018-12-26 ASSESSMENT — PAIN DESCRIPTION - LOCATION: LOCATION: FOOT

## 2018-12-26 ASSESSMENT — PAIN DESCRIPTION - ORIENTATION: ORIENTATION: RIGHT

## 2018-12-27 ENCOUNTER — CARE COORDINATION (OUTPATIENT)
Dept: CARE COORDINATION | Age: 28
End: 2018-12-27

## 2018-12-27 LAB — HCG, PREGNANCY URINE (POC): NEGATIVE

## 2018-12-28 LAB
HPV SAMPLE: NORMAL
HPV SOURCE: NORMAL
HPV, GENOTYPE 16: NOT DETECTED
HPV, GENOTYPE 18: NOT DETECTED
HPV, HIGH RISK OTHER: NOT DETECTED
HPV, INTERPRETATION: NORMAL

## 2018-12-28 NOTE — ED PROVIDER NOTES
by mouth 2 times daily, Disp-30 tablet, R-0Print      !! albuterol sulfate HFA (VENTOLIN HFA) 108 (90 Base) MCG/ACT inhaler Inhale 2 puffs into the lungs every 6 hours as needed for Wheezing, Disp-1 Inhaler, R-1Print      diphenhydrAMINE (BENADRYL) 25 MG capsule Take 1 capsule by mouth every 4 hours as needed for Itching, Disp-15 capsule, R-0Print      FLUoxetine (PROZAC) 40 MG capsule Take 1 capsule by mouth daily, Disp-30 capsule, R-0      nicotine polacrilex (NICORETTE) 2 MG gum Take 1 each by mouth as needed for Smoking cessation, Disp-110 each, R-0      ziprasidone (GEODON) 40 MG capsule Take 1 capsule by mouth 2 times daily (with meals), Disp-60 capsule, R-0      traZODone (DESYREL) 50 MG tablet Take 1 tablet by mouth nightly as needed for Sleep, Disp-30 tablet, R-0       !! - Potential duplicate medications found. Please discuss with provider. PAST MEDICAL HISTORY         Diagnosis Date    Asthma     Has not needed inhaler since 2009    B[facility] disorder, mixed ([facility]     Cannabis abuse 12/31/2016    Depression     Diabetes mellitus ([facility] 11/20/2013    borderline    Obesity 8/1/2013    Polycystic ovarian disease        SURGICAL HISTORY           Procedure Laterality Date    TONSILLECTOMY AND ADENOIDECTOMY      WISDOM TOOTH EXTRACTION           FAMILY HISTORY       Family History   Problem Relation Age of Onset    High Blood Pressure Mother     Diabetes Maternal Grandfather     High Blood Pressure Maternal Grandfather     Schizophrenia Maternal Aunt     Bipolar Disorder Maternal Aunt     Schizophrenia Maternal Aunt     Colon Cancer Neg Hx     Breast Cancer Neg Hx     Uterine Cancer Neg Hx     Ovarian Cancer Neg Hx      Family Status   Relation Status    Mother Alive    MGF Alive    Father Alive    MAunt (Not Specified)    MAunt (Not Specified)    Neg Hx (Not Specified)        SOCIAL HISTORY      reports that she has been smoking Cigarettes.   She has a 0.75 pack-year smoking history. She has never used smokeless tobacco. She reports that she does not drink alcohol or use drugs. REVIEW OF SYSTEMS    (2-9 systems for level 4, 10 or more for level 5)     Review of Systems   All other systems reviewed and are negative. Except as noted above the remainder of the review of systems was reviewed and negative. PHYSICAL EXAM    (up to 7 for level 4, 8 or more for level 5)     ED Triage Vitals [12/26/18 1244]   BP Temp Temp src Pulse Resp SpO2 Height Weight   (!) 148/81 98.6 °F (37 °C) -- 89 18 100 % 5' 7\" (1.702 m) (!) 350 lb (158.8 kg)       Physical Exam   Constitutional: She is oriented to person, place, and time. She appears well-developed and well-nourished. Eyes: Conjunctivae are normal. Right eye exhibits no discharge. Left eye exhibits no discharge. Cardiovascular: Normal rate, regular rhythm and intact distal pulses. Good capillary refill   Pulmonary/Chest: Effort normal and breath sounds normal. No respiratory distress. Musculoskeletal:   Tenderness to the entire 4th and 5th metatarsals. No edema, erythema or deformity. Skin is not hot to toucg   Neurological: She is alert and oriented to person, place, and time. Skin: Skin is warm and dry. DIAGNOSTIC RESULTS     EKG: All EKG's are interpreted by the Emergency Department Physician who either signs or Co-signs this chart in the absence of a cardiologist.      RADIOLOGY:   Non-plain film images such as CT, Ultrasound and MRI are read by the radiologist. Plain radiographic images are visualized and preliminarily interpreted by the emergency physician with the below findings:    Interpretation per the Radiologist below, if available at the time of this note:    XR FOOT RIGHT (MIN 3 VIEWS)   Final Result   No acute osseous abnormality.                  LABS:  Labs Reviewed   POCT URINE PREGNANCY - Normal   POCT URINE PREGNANCY       All other labs were within normal range or not returned as of this

## 2018-12-31 LAB — CYTOLOGY REPORT: NORMAL

## 2019-01-04 ENCOUNTER — TELEPHONE (OUTPATIENT)
Dept: BARIATRICS/WEIGHT MGMT | Age: 29
End: 2019-01-04

## 2019-01-07 ENCOUNTER — PROCEDURE VISIT (OUTPATIENT)
Dept: OBGYN | Age: 29
End: 2019-01-07
Payer: MEDICARE

## 2019-01-07 ENCOUNTER — HOSPITAL ENCOUNTER (OUTPATIENT)
Age: 29
Setting detail: SPECIMEN
Discharge: HOME | End: 2019-01-07
Payer: MEDICARE

## 2019-01-07 VITALS
WEIGHT: 293 LBS | DIASTOLIC BLOOD PRESSURE: 94 MMHG | SYSTOLIC BLOOD PRESSURE: 144 MMHG | HEIGHT: 67 IN | BODY MASS INDEX: 45.99 KG/M2 | HEART RATE: 77 BPM

## 2019-01-07 DIAGNOSIS — N92.6 IRREGULAR MENSES: ICD-10-CM

## 2019-01-07 DIAGNOSIS — N92.6 IRREGULAR MENSES: Primary | ICD-10-CM

## 2019-01-07 LAB
CONTROL: YES
PREGNANCY TEST URINE, POC: NEGATIVE

## 2019-01-07 PROCEDURE — 99213 OFFICE O/P EST LOW 20 MIN: CPT | Performed by: OBSTETRICS & GYNECOLOGY

## 2019-01-07 PROCEDURE — 81025 URINE PREGNANCY TEST: CPT | Performed by: STUDENT IN AN ORGANIZED HEALTH CARE EDUCATION/TRAINING PROGRAM

## 2019-01-07 PROCEDURE — 58300 INSERT INTRAUTERINE DEVICE: CPT | Performed by: STUDENT IN AN ORGANIZED HEALTH CARE EDUCATION/TRAINING PROGRAM

## 2019-01-07 PROCEDURE — 6360000002 HC RX W HCPCS

## 2019-01-08 ENCOUNTER — OFFICE VISIT (OUTPATIENT)
Dept: INTERNAL MEDICINE | Age: 29
End: 2019-01-08
Payer: MEDICARE

## 2019-01-08 VITALS
DIASTOLIC BLOOD PRESSURE: 82 MMHG | HEART RATE: 103 BPM | WEIGHT: 293 LBS | HEIGHT: 67 IN | BODY MASS INDEX: 45.99 KG/M2 | SYSTOLIC BLOOD PRESSURE: 124 MMHG

## 2019-01-08 DIAGNOSIS — R73.03 PREDIABETES: Primary | ICD-10-CM

## 2019-01-08 DIAGNOSIS — E66.01 MORBID OBESITY WITH BMI OF 50.0-59.9, ADULT (HCC): ICD-10-CM

## 2019-01-08 DIAGNOSIS — F31.60 BIPOLAR DISORDER, MIXED (HCC): ICD-10-CM

## 2019-01-08 DIAGNOSIS — I10 ESSENTIAL HYPERTENSION: ICD-10-CM

## 2019-01-08 LAB
DIRECT EXAM: NORMAL
Lab: NORMAL
SPECIMEN DESCRIPTION: NORMAL
STATUS: NORMAL

## 2019-01-08 PROCEDURE — G8484 FLU IMMUNIZE NO ADMIN: HCPCS | Performed by: INTERNAL MEDICINE

## 2019-01-08 PROCEDURE — G8427 DOCREV CUR MEDS BY ELIG CLIN: HCPCS | Performed by: INTERNAL MEDICINE

## 2019-01-08 PROCEDURE — 99213 OFFICE O/P EST LOW 20 MIN: CPT | Performed by: INTERNAL MEDICINE

## 2019-01-08 PROCEDURE — G8417 CALC BMI ABV UP PARAM F/U: HCPCS | Performed by: INTERNAL MEDICINE

## 2019-01-08 PROCEDURE — 4004F PT TOBACCO SCREEN RCVD TLK: CPT | Performed by: INTERNAL MEDICINE

## 2019-01-08 PROCEDURE — 99211 OFF/OP EST MAY X REQ PHY/QHP: CPT | Performed by: INTERNAL MEDICINE

## 2019-01-08 RX ORDER — METOPROLOL SUCCINATE 25 MG/1
25 TABLET, EXTENDED RELEASE ORAL DAILY
Qty: 30 TABLET | Refills: 3 | Status: SHIPPED | OUTPATIENT
Start: 2019-01-08 | End: 2020-08-11

## 2019-01-09 LAB
C TRACH DNA GENITAL QL NAA+PROBE: NEGATIVE
N. GONORRHOEAE DNA: NEGATIVE

## 2019-01-10 ENCOUNTER — HOSPITAL ENCOUNTER (OUTPATIENT)
Age: 29
Discharge: HOME | End: 2019-01-10
Payer: MEDICARE

## 2019-01-10 DIAGNOSIS — Z00.00 HEALTH CARE MAINTENANCE: ICD-10-CM

## 2019-01-10 DIAGNOSIS — Z13.220 SCREENING FOR HYPERLIPIDEMIA: ICD-10-CM

## 2019-01-10 DIAGNOSIS — J45.909 UNCOMPLICATED ASTHMA, UNSPECIFIED ASTHMA SEVERITY, UNSPECIFIED WHETHER PERSISTENT: ICD-10-CM

## 2019-01-10 DIAGNOSIS — E28.2 PCOS (POLYCYSTIC OVARIAN SYNDROME): ICD-10-CM

## 2019-01-10 DIAGNOSIS — I10 ESSENTIAL HYPERTENSION: ICD-10-CM

## 2019-01-10 DIAGNOSIS — F31.60 BIPOLAR DISORDER, MIXED (HCC): ICD-10-CM

## 2019-01-10 DIAGNOSIS — F32.A ANXIETY AND DEPRESSION: ICD-10-CM

## 2019-01-10 DIAGNOSIS — E66.01 MORBID OBESITY WITH BMI OF 50.0-59.9, ADULT (HCC): ICD-10-CM

## 2019-01-10 DIAGNOSIS — F41.9 ANXIETY AND DEPRESSION: ICD-10-CM

## 2019-01-10 DIAGNOSIS — R73.03 PREDIABETES: ICD-10-CM

## 2019-01-10 DIAGNOSIS — E11.9 TYPE 2 DIABETES MELLITUS WITHOUT COMPLICATION, WITHOUT LONG-TERM CURRENT USE OF INSULIN (HCC): ICD-10-CM

## 2019-01-10 LAB
ABSOLUTE EOS #: 0.18 K/UL (ref 0–0.44)
ABSOLUTE EOS #: 0.18 K/UL (ref 0–0.44)
ABSOLUTE IMMATURE GRANULOCYTE: 0.07 K/UL (ref 0–0.3)
ABSOLUTE IMMATURE GRANULOCYTE: 0.07 K/UL (ref 0–0.3)
ABSOLUTE LYMPH #: 2.96 K/UL (ref 1.1–3.7)
ABSOLUTE LYMPH #: 2.96 K/UL (ref 1.1–3.7)
ABSOLUTE MONO #: 0.58 K/UL (ref 0.1–1.2)
ABSOLUTE MONO #: 0.58 K/UL (ref 0.1–1.2)
ALBUMIN SERPL-MCNC: 4.1 G/DL (ref 3.5–5.2)
ALBUMIN/GLOBULIN RATIO: 1.1 (ref 1–2.5)
ALP BLD-CCNC: 62 U/L (ref 35–104)
ALT SERPL-CCNC: 26 U/L (ref 5–33)
ANION GAP SERPL CALCULATED.3IONS-SCNC: 20 MMOL/L (ref 9–17)
AST SERPL-CCNC: 17 U/L
BASOPHILS # BLD: 1 % (ref 0–2)
BASOPHILS # BLD: 1 % (ref 0–2)
BASOPHILS ABSOLUTE: 0.06 K/UL (ref 0–0.2)
BASOPHILS ABSOLUTE: 0.06 K/UL (ref 0–0.2)
BILIRUB SERPL-MCNC: 0.24 MG/DL (ref 0.3–1.2)
BUN BLDV-MCNC: 12 MG/DL (ref 6–20)
BUN/CREAT BLD: ABNORMAL (ref 9–20)
CALCIUM SERPL-MCNC: 9.3 MG/DL (ref 8.6–10.4)
CHLORIDE BLD-SCNC: 101 MMOL/L (ref 98–107)
CHOLESTEROL, FASTING: 231 MG/DL
CHOLESTEROL/HDL RATIO: 4.8
CHOLESTEROL/HDL RATIO: 4.8
CHOLESTEROL: 231 MG/DL
CO2: 24 MMOL/L (ref 20–31)
CREAT SERPL-MCNC: 0.97 MG/DL (ref 0.5–0.9)
CREATININE URINE: 212.6 MG/DL (ref 28–217)
DIFFERENTIAL TYPE: ABNORMAL
DIFFERENTIAL TYPE: ABNORMAL
EKG ATRIAL RATE: 80 BPM
EKG P AXIS: 32 DEGREES
EKG P-R INTERVAL: 162 MS
EKG Q-T INTERVAL: 376 MS
EKG QRS DURATION: 84 MS
EKG QTC CALCULATION (BAZETT): 433 MS
EKG R AXIS: 23 DEGREES
EKG T AXIS: 25 DEGREES
EKG VENTRICULAR RATE: 80 BPM
EOSINOPHILS RELATIVE PERCENT: 2 % (ref 1–4)
EOSINOPHILS RELATIVE PERCENT: 2 % (ref 1–4)
ESTIMATED AVERAGE GLUCOSE: 134 MG/DL
GFR AFRICAN AMERICAN: >60 ML/MIN
GFR NON-AFRICAN AMERICAN: >60 ML/MIN
GFR SERPL CREATININE-BSD FRML MDRD: ABNORMAL ML/MIN/{1.73_M2}
GFR SERPL CREATININE-BSD FRML MDRD: ABNORMAL ML/MIN/{1.73_M2}
GLUCOSE BLD-MCNC: 118 MG/DL (ref 70–99)
HBA1C MFR BLD: 6.3 % (ref 4–6)
HCT VFR BLD CALC: 45.4 % (ref 36.3–47.1)
HCT VFR BLD CALC: 45.4 % (ref 36.3–47.1)
HDLC SERPL-MCNC: 48 MG/DL
HDLC SERPL-MCNC: 48 MG/DL
HEMOGLOBIN: 14.5 G/DL (ref 11.9–15.1)
HEMOGLOBIN: 14.5 G/DL (ref 11.9–15.1)
HIV AG/AB: NONREACTIVE
IMMATURE GRANULOCYTES: 1 %
IMMATURE GRANULOCYTES: 1 %
LDL CHOLESTEROL: 158 MG/DL (ref 0–130)
LDL CHOLESTEROL: 158 MG/DL (ref 0–130)
LYMPHOCYTES # BLD: 33 % (ref 24–43)
LYMPHOCYTES # BLD: 33 % (ref 24–43)
MCH RBC QN AUTO: 27.2 PG (ref 25.2–33.5)
MCH RBC QN AUTO: 27.2 PG (ref 25.2–33.5)
MCHC RBC AUTO-ENTMCNC: 31.9 G/DL (ref 28.4–34.8)
MCHC RBC AUTO-ENTMCNC: 31.9 G/DL (ref 28.4–34.8)
MCV RBC AUTO: 85.2 FL (ref 82.6–102.9)
MCV RBC AUTO: 85.2 FL (ref 82.6–102.9)
MICROALBUMIN/CREAT 24H UR: <12 MG/L
MICROALBUMIN/CREAT UR-RTO: NORMAL MCG/MG CREAT
MONOCYTES # BLD: 6 % (ref 3–12)
MONOCYTES # BLD: 6 % (ref 3–12)
NRBC AUTOMATED: 0 PER 100 WBC
NRBC AUTOMATED: 0 PER 100 WBC
PDW BLD-RTO: 13 % (ref 11.8–14.4)
PDW BLD-RTO: 13 % (ref 11.8–14.4)
PLATELET # BLD: 394 K/UL (ref 138–453)
PLATELET # BLD: 394 K/UL (ref 138–453)
PLATELET ESTIMATE: ABNORMAL
PLATELET ESTIMATE: ABNORMAL
PMV BLD AUTO: 9.4 FL (ref 8.1–13.5)
PMV BLD AUTO: 9.4 FL (ref 8.1–13.5)
POTASSIUM SERPL-SCNC: 4.3 MMOL/L (ref 3.7–5.3)
RBC # BLD: 5.33 M/UL (ref 3.95–5.11)
RBC # BLD: 5.33 M/UL (ref 3.95–5.11)
RBC # BLD: ABNORMAL 10*6/UL
RBC # BLD: ABNORMAL 10*6/UL
SEG NEUTROPHILS: 57 % (ref 36–65)
SEG NEUTROPHILS: 57 % (ref 36–65)
SEGMENTED NEUTROPHILS ABSOLUTE COUNT: 5.2 K/UL (ref 1.5–8.1)
SEGMENTED NEUTROPHILS ABSOLUTE COUNT: 5.2 K/UL (ref 1.5–8.1)
SODIUM BLD-SCNC: 145 MMOL/L (ref 135–144)
T3 FREE: 3.49 PG/ML (ref 2.02–4.43)
THYROXINE, FREE: 1.1 NG/DL (ref 0.93–1.7)
TOTAL PROTEIN: 7.7 G/DL (ref 6.4–8.3)
TRIGL SERPL-MCNC: 124 MG/DL
TRIGLYCERIDE, FASTING: 124 MG/DL
TSH SERPL DL<=0.05 MIU/L-ACNC: 1.06 MIU/L (ref 0.3–5)
VITAMIN B-12: 531 PG/ML (ref 232–1245)
VITAMIN D 25-HYDROXY: 12.6 NG/ML (ref 30–100)
VLDLC SERPL CALC-MCNC: ABNORMAL MG/DL (ref 1–30)
VLDLC SERPL CALC-MCNC: ABNORMAL MG/DL (ref 1–30)
WBC # BLD: 9.1 K/UL (ref 3.5–11.3)
WBC # BLD: 9.1 K/UL (ref 3.5–11.3)
WBC # BLD: ABNORMAL 10*3/UL
WBC # BLD: ABNORMAL 10*3/UL

## 2019-01-10 PROCEDURE — 82043 UR ALBUMIN QUANTITATIVE: CPT

## 2019-01-10 PROCEDURE — 80053 COMPREHEN METABOLIC PANEL: CPT

## 2019-01-10 PROCEDURE — 83036 HEMOGLOBIN GLYCOSYLATED A1C: CPT

## 2019-01-10 PROCEDURE — 84439 ASSAY OF FREE THYROXINE: CPT

## 2019-01-10 PROCEDURE — 82570 ASSAY OF URINE CREATININE: CPT

## 2019-01-10 PROCEDURE — 82306 VITAMIN D 25 HYDROXY: CPT

## 2019-01-10 PROCEDURE — 36415 COLL VENOUS BLD VENIPUNCTURE: CPT

## 2019-01-10 PROCEDURE — 84481 FREE ASSAY (FT-3): CPT

## 2019-01-10 PROCEDURE — 84550 ASSAY OF BLOOD/URIC ACID: CPT

## 2019-01-10 PROCEDURE — 80061 LIPID PANEL: CPT

## 2019-01-10 PROCEDURE — 85025 COMPLETE CBC W/AUTO DIFF WBC: CPT

## 2019-01-10 PROCEDURE — 93005 ELECTROCARDIOGRAM TRACING: CPT

## 2019-01-10 PROCEDURE — 87389 HIV-1 AG W/HIV-1&-2 AB AG IA: CPT

## 2019-01-10 PROCEDURE — 84443 ASSAY THYROID STIM HORMONE: CPT

## 2019-01-10 PROCEDURE — 82607 VITAMIN B-12: CPT

## 2019-01-11 LAB
ALBUMIN SERPL-MCNC: 4.1 G/DL (ref 3.5–5.2)
ALBUMIN/GLOBULIN RATIO: 1.1 (ref 1–2.5)
ALP BLD-CCNC: 62 U/L (ref 35–104)
ALT SERPL-CCNC: 26 U/L (ref 5–33)
ANION GAP SERPL CALCULATED.3IONS-SCNC: 20 MMOL/L (ref 9–17)
AST SERPL-CCNC: 17 U/L
BILIRUB SERPL-MCNC: 0.24 MG/DL (ref 0.3–1.2)
BUN BLDV-MCNC: 12 MG/DL (ref 6–20)
BUN/CREAT BLD: ABNORMAL (ref 9–20)
CALCIUM SERPL-MCNC: 9.3 MG/DL (ref 8.6–10.4)
CHLORIDE BLD-SCNC: 101 MMOL/L (ref 98–107)
CHOLESTEROL/HDL RATIO: 4.8
CHOLESTEROL: 231 MG/DL
CO2: 24 MMOL/L (ref 20–31)
CREAT SERPL-MCNC: 0.97 MG/DL (ref 0.5–0.9)
GFR AFRICAN AMERICAN: >60 ML/MIN
GFR NON-AFRICAN AMERICAN: >60 ML/MIN
GFR SERPL CREATININE-BSD FRML MDRD: ABNORMAL ML/MIN/{1.73_M2}
GFR SERPL CREATININE-BSD FRML MDRD: ABNORMAL ML/MIN/{1.73_M2}
GLUCOSE BLD-MCNC: 118 MG/DL (ref 70–99)
HDLC SERPL-MCNC: 48 MG/DL
LDL CHOLESTEROL: 158 MG/DL (ref 0–130)
POTASSIUM SERPL-SCNC: 4.3 MMOL/L (ref 3.7–5.3)
SODIUM BLD-SCNC: 145 MMOL/L (ref 135–144)
TOTAL PROTEIN: 7.7 G/DL (ref 6.4–8.3)
TRIGL SERPL-MCNC: 124 MG/DL
TSH SERPL DL<=0.05 MIU/L-ACNC: 1.06 MIU/L (ref 0.3–5)
URIC ACID: 7.5 MG/DL (ref 2.4–5.7)
VLDLC SERPL CALC-MCNC: ABNORMAL MG/DL (ref 1–30)

## 2019-01-14 ENCOUNTER — TELEPHONE (OUTPATIENT)
Dept: INTERNAL MEDICINE | Age: 29
End: 2019-01-14

## 2019-01-14 ENCOUNTER — TELEPHONE (OUTPATIENT)
Dept: BARIATRICS/WEIGHT MGMT | Age: 29
End: 2019-01-14

## 2019-01-14 DIAGNOSIS — R94.31 ABNORMAL EKG: Primary | ICD-10-CM

## 2019-01-15 PROBLEM — R87.610 ATYPICAL SQUAMOUS CELL CHANGES OF UNDETERMINED SIGNIFICANCE (ASCUS) ON CERVICAL CYTOLOGY WITH NEGATIVE HIGH RISK HUMAN PAPILLOMA VIRUS (HPV) TEST RESULT: Status: ACTIVE | Noted: 2019-01-15

## 2019-01-15 LAB
STATUS: NORMAL
STATUS: NORMAL

## 2019-01-23 ENCOUNTER — HOSPITAL ENCOUNTER (OUTPATIENT)
Dept: NUCLEAR MEDICINE | Age: 29
Discharge: HOME | End: 2019-01-25
Payer: MEDICARE

## 2019-01-23 ENCOUNTER — HOSPITAL ENCOUNTER (OUTPATIENT)
Dept: NON INVASIVE DIAGNOSTICS | Age: 29
Discharge: HOME | End: 2019-01-23
Payer: MEDICARE

## 2019-01-23 DIAGNOSIS — R94.31 ABNORMAL EKG: ICD-10-CM

## 2019-01-23 PROCEDURE — 2580000003 HC RX 258: Performed by: INTERNAL MEDICINE

## 2019-01-23 PROCEDURE — A9500 TC99M SESTAMIBI: HCPCS | Performed by: INTERNAL MEDICINE

## 2019-01-23 PROCEDURE — 78452 HT MUSCLE IMAGE SPECT MULT: CPT

## 2019-01-23 PROCEDURE — 6360000002 HC RX W HCPCS: Performed by: INTERNAL MEDICINE

## 2019-01-23 PROCEDURE — 93017 CV STRESS TEST TRACING ONLY: CPT

## 2019-01-23 PROCEDURE — 3430000000 HC RX DIAGNOSTIC RADIOPHARMACEUTICAL: Performed by: INTERNAL MEDICINE

## 2019-01-23 RX ORDER — AMINOPHYLLINE DIHYDRATE 25 MG/ML
100 INJECTION, SOLUTION INTRAVENOUS
Status: DISCONTINUED | OUTPATIENT
Start: 2019-01-23 | End: 2019-01-23 | Stop reason: ALTCHOICE

## 2019-01-23 RX ORDER — SODIUM CHLORIDE 9 MG/ML
INJECTION, SOLUTION INTRAVENOUS ONCE
Status: DISCONTINUED | OUTPATIENT
Start: 2019-01-23 | End: 2019-01-23 | Stop reason: ALTCHOICE

## 2019-01-23 RX ORDER — METOPROLOL TARTRATE 5 MG/5ML
2.5 INJECTION INTRAVENOUS PRN
Status: DISCONTINUED | OUTPATIENT
Start: 2019-01-23 | End: 2019-01-23 | Stop reason: ALTCHOICE

## 2019-01-23 RX ORDER — SODIUM CHLORIDE 0.9 % (FLUSH) 0.9 %
10 SYRINGE (ML) INJECTION PRN
Status: DISCONTINUED | OUTPATIENT
Start: 2019-01-23 | End: 2019-01-23 | Stop reason: ALTCHOICE

## 2019-01-23 RX ORDER — NITROGLYCERIN 0.4 MG/1
0.4 TABLET SUBLINGUAL EVERY 5 MIN PRN
Status: DISCONTINUED | OUTPATIENT
Start: 2019-01-23 | End: 2019-01-23 | Stop reason: ALTCHOICE

## 2019-01-23 RX ADMIN — TETRAKIS(2-METHOXYISOBUTYLISOCYANIDE)COPPER(I) TETRAFLUOROBORATE 41.3 MILLICURIE: 1 INJECTION, POWDER, LYOPHILIZED, FOR SOLUTION INTRAVENOUS at 10:20

## 2019-01-23 RX ADMIN — Medication 10 ML: at 10:18

## 2019-01-23 RX ADMIN — REGADENOSON 0.4 MG: 0.08 INJECTION, SOLUTION INTRAVENOUS at 10:20

## 2019-01-23 RX ADMIN — SODIUM CHLORIDE, PRESERVATIVE FREE 10 ML: 5 INJECTION INTRAVENOUS at 10:20

## 2019-01-24 ENCOUNTER — TELEPHONE (OUTPATIENT)
Dept: INTERNAL MEDICINE | Age: 29
End: 2019-01-24

## 2019-01-24 LAB
LV EF: 60 %
LVEF MODALITY: NORMAL

## 2019-01-24 PROCEDURE — 3430000000 HC RX DIAGNOSTIC RADIOPHARMACEUTICAL: Performed by: INTERNAL MEDICINE

## 2019-01-24 PROCEDURE — 2580000003 HC RX 258: Performed by: INTERNAL MEDICINE

## 2019-01-24 PROCEDURE — A9500 TC99M SESTAMIBI: HCPCS | Performed by: INTERNAL MEDICINE

## 2019-01-24 RX ORDER — SODIUM CHLORIDE 0.9 % (FLUSH) 0.9 %
10 SYRINGE (ML) INJECTION PRN
Status: DISCONTINUED | OUTPATIENT
Start: 2019-01-24 | End: 2019-01-26 | Stop reason: HOSPADM

## 2019-01-24 RX ADMIN — SODIUM CHLORIDE, PRESERVATIVE FREE 10 ML: 5 INJECTION INTRAVENOUS at 10:55

## 2019-01-24 RX ADMIN — TETRAKIS(2-METHOXYISOBUTYLISOCYANIDE)COPPER(I) TETRAFLUOROBORATE 40.9 MILLICURIE: 1 INJECTION, POWDER, LYOPHILIZED, FOR SOLUTION INTRAVENOUS at 10:55

## 2019-02-15 ENCOUNTER — OFFICE VISIT (OUTPATIENT)
Dept: OBGYN | Age: 29
End: 2019-02-15
Payer: MEDICARE

## 2019-02-15 ENCOUNTER — HOSPITAL ENCOUNTER (OUTPATIENT)
Age: 29
Setting detail: SPECIMEN
Discharge: HOME | End: 2019-02-15
Payer: MEDICARE

## 2019-02-15 VITALS
SYSTOLIC BLOOD PRESSURE: 110 MMHG | BODY MASS INDEX: 45.99 KG/M2 | WEIGHT: 293 LBS | HEIGHT: 67 IN | DIASTOLIC BLOOD PRESSURE: 69 MMHG | HEART RATE: 79 BPM

## 2019-02-15 DIAGNOSIS — Z97.5 IUD (INTRAUTERINE DEVICE) IN PLACE: ICD-10-CM

## 2019-02-15 DIAGNOSIS — N89.8 VAGINAL ODOR: Primary | ICD-10-CM

## 2019-02-15 DIAGNOSIS — Z30.431 ENCOUNTER FOR ROUTINE CHECKING OF INTRAUTERINE CONTRACEPTIVE DEVICE (IUD): ICD-10-CM

## 2019-02-15 LAB
DIRECT EXAM: ABNORMAL
Lab: ABNORMAL
SPECIMEN DESCRIPTION: ABNORMAL

## 2019-02-15 PROCEDURE — G8417 CALC BMI ABV UP PARAM F/U: HCPCS | Performed by: STUDENT IN AN ORGANIZED HEALTH CARE EDUCATION/TRAINING PROGRAM

## 2019-02-15 PROCEDURE — G8484 FLU IMMUNIZE NO ADMIN: HCPCS | Performed by: STUDENT IN AN ORGANIZED HEALTH CARE EDUCATION/TRAINING PROGRAM

## 2019-02-15 PROCEDURE — 99213 OFFICE O/P EST LOW 20 MIN: CPT | Performed by: STUDENT IN AN ORGANIZED HEALTH CARE EDUCATION/TRAINING PROGRAM

## 2019-02-15 PROCEDURE — 4004F PT TOBACCO SCREEN RCVD TLK: CPT | Performed by: STUDENT IN AN ORGANIZED HEALTH CARE EDUCATION/TRAINING PROGRAM

## 2019-02-15 PROCEDURE — G8427 DOCREV CUR MEDS BY ELIG CLIN: HCPCS | Performed by: STUDENT IN AN ORGANIZED HEALTH CARE EDUCATION/TRAINING PROGRAM

## 2019-02-15 PROCEDURE — 99212 OFFICE O/P EST SF 10 MIN: CPT | Performed by: STUDENT IN AN ORGANIZED HEALTH CARE EDUCATION/TRAINING PROGRAM

## 2019-02-18 LAB
C TRACH DNA GENITAL QL NAA+PROBE: NEGATIVE
N. GONORRHOEAE DNA: NEGATIVE
SPECIMEN DESCRIPTION: NORMAL

## 2019-02-19 RX ORDER — METRONIDAZOLE 500 MG/1
500 TABLET ORAL 2 TIMES DAILY
Qty: 14 TABLET | Refills: 0 | Status: SHIPPED | OUTPATIENT
Start: 2019-02-19 | End: 2019-02-26

## 2019-03-25 ENCOUNTER — HOSPITAL ENCOUNTER (EMERGENCY)
Age: 29
Discharge: HOME | End: 2019-03-25
Attending: EMERGENCY MEDICINE
Payer: MEDICARE

## 2019-03-25 VITALS
OXYGEN SATURATION: 100 % | HEART RATE: 104 BPM | TEMPERATURE: 98.7 F | SYSTOLIC BLOOD PRESSURE: 160 MMHG | DIASTOLIC BLOOD PRESSURE: 106 MMHG | HEIGHT: 67 IN | RESPIRATION RATE: 16 BRPM | BODY MASS INDEX: 40.81 KG/M2 | WEIGHT: 260 LBS

## 2019-03-25 DIAGNOSIS — J02.0 STREPTOCOCCAL SORE THROAT: Primary | ICD-10-CM

## 2019-03-25 LAB
DIRECT EXAM: ABNORMAL
DIRECT EXAM: NORMAL
Lab: ABNORMAL
Lab: NORMAL
SPECIMEN DESCRIPTION: ABNORMAL
SPECIMEN DESCRIPTION: NORMAL

## 2019-03-25 PROCEDURE — 6370000000 HC RX 637 (ALT 250 FOR IP): Performed by: PHYSICIAN ASSISTANT

## 2019-03-25 PROCEDURE — 96374 THER/PROPH/DIAG INJ IV PUSH: CPT

## 2019-03-25 PROCEDURE — 6360000002 HC RX W HCPCS: Performed by: PHYSICIAN ASSISTANT

## 2019-03-25 PROCEDURE — 99283 EMERGENCY DEPT VISIT LOW MDM: CPT

## 2019-03-25 PROCEDURE — 96372 THER/PROPH/DIAG INJ SC/IM: CPT

## 2019-03-25 PROCEDURE — 87804 INFLUENZA ASSAY W/OPTIC: CPT

## 2019-03-25 PROCEDURE — 87880 STREP A ASSAY W/OPTIC: CPT

## 2019-03-25 RX ORDER — IBUPROFEN 800 MG/1
800 TABLET ORAL EVERY 8 HOURS PRN
Qty: 20 TABLET | Refills: 0 | Status: SHIPPED | OUTPATIENT
Start: 2019-03-25 | End: 2020-04-02 | Stop reason: SDUPTHER

## 2019-03-25 RX ORDER — ACETAMINOPHEN 325 MG/1
650 TABLET ORAL EVERY 6 HOURS PRN
Qty: 30 TABLET | Refills: 0 | Status: SHIPPED | OUTPATIENT
Start: 2019-03-25 | End: 2019-07-15 | Stop reason: SDUPTHER

## 2019-03-25 RX ORDER — ACETAMINOPHEN 325 MG/1
650 TABLET ORAL ONCE
Status: COMPLETED | OUTPATIENT
Start: 2019-03-25 | End: 2019-03-25

## 2019-03-25 RX ORDER — DEXAMETHASONE SODIUM PHOSPHATE 10 MG/ML
10 INJECTION INTRAMUSCULAR; INTRAVENOUS ONCE
Status: COMPLETED | OUTPATIENT
Start: 2019-03-25 | End: 2019-03-25

## 2019-03-25 RX ADMIN — DEXAMETHASONE SODIUM PHOSPHATE 10 MG: 10 INJECTION INTRAMUSCULAR; INTRAVENOUS at 21:50

## 2019-03-25 RX ADMIN — PENICILLIN G BENZATHINE 1.2 MILLION UNITS: 1200000 INJECTION, SUSPENSION INTRAMUSCULAR at 21:50

## 2019-03-25 RX ADMIN — ACETAMINOPHEN 650 MG: 325 TABLET ORAL at 20:51

## 2019-03-25 ASSESSMENT — ENCOUNTER SYMPTOMS
NAUSEA: 0
EYE ITCHING: 0
BACK PAIN: 0
WHEEZING: 0
EYE PAIN: 0
EYE DISCHARGE: 0
VOMITING: 0
SORE THROAT: 1
COUGH: 0
COLOR CHANGE: 0
RHINORRHEA: 0

## 2019-03-25 ASSESSMENT — PAIN SCALES - GENERAL: PAINLEVEL_OUTOF10: 10

## 2019-06-14 ENCOUNTER — HOSPITAL ENCOUNTER (EMERGENCY)
Age: 29
Discharge: HOME | End: 2019-06-14
Attending: EMERGENCY MEDICINE
Payer: MEDICARE

## 2019-06-14 VITALS
BODY MASS INDEX: 45.99 KG/M2 | WEIGHT: 293 LBS | RESPIRATION RATE: 16 BRPM | OXYGEN SATURATION: 99 % | HEART RATE: 94 BPM | SYSTOLIC BLOOD PRESSURE: 145 MMHG | TEMPERATURE: 98.6 F | HEIGHT: 67 IN | DIASTOLIC BLOOD PRESSURE: 86 MMHG

## 2019-06-14 DIAGNOSIS — J04.0 LARYNGITIS: ICD-10-CM

## 2019-06-14 DIAGNOSIS — J02.9 ACUTE PHARYNGITIS, UNSPECIFIED ETIOLOGY: Primary | ICD-10-CM

## 2019-06-14 LAB
DIRECT EXAM: NORMAL
Lab: NORMAL
MONONUCLEOSIS SCREEN: NEGATIVE
SPECIMEN DESCRIPTION: NORMAL

## 2019-06-14 PROCEDURE — 99282 EMERGENCY DEPT VISIT SF MDM: CPT

## 2019-06-14 PROCEDURE — 86308 HETEROPHILE ANTIBODY SCREEN: CPT

## 2019-06-14 PROCEDURE — 87880 STREP A ASSAY W/OPTIC: CPT

## 2019-06-14 PROCEDURE — 96372 THER/PROPH/DIAG INJ SC/IM: CPT

## 2019-06-14 PROCEDURE — 6360000002 HC RX W HCPCS: Performed by: EMERGENCY MEDICINE

## 2019-06-14 RX ORDER — HYDROCODONE BITARTRATE AND ACETAMINOPHEN 5; 325 MG/1; MG/1
1 TABLET ORAL EVERY 6 HOURS PRN
Qty: 20 TABLET | Refills: 0 | Status: SHIPPED | OUTPATIENT
Start: 2019-06-14 | End: 2019-06-19

## 2019-06-14 RX ORDER — PREDNISONE 20 MG/1
20 TABLET ORAL 2 TIMES DAILY
Qty: 10 TABLET | Refills: 0 | Status: SHIPPED | OUTPATIENT
Start: 2019-06-14 | End: 2019-06-19

## 2019-06-14 RX ORDER — DEXAMETHASONE SODIUM PHOSPHATE 10 MG/ML
10 INJECTION INTRAMUSCULAR; INTRAVENOUS ONCE
Status: COMPLETED | OUTPATIENT
Start: 2019-06-14 | End: 2019-06-14

## 2019-06-14 RX ADMIN — DEXAMETHASONE SODIUM PHOSPHATE 10 MG: 10 INJECTION INTRAMUSCULAR; INTRAVENOUS at 22:28

## 2019-06-15 NOTE — ED PROVIDER NOTES
of systems was reviewed and negative. PHYSICAL EXAM    (up to 7 for level 4, 8 or more for level 5)     Vitals:    06/14/19 2144   BP: (!) 145/86   Pulse: 94   Resp: 16   Temp: 98.6 °F (37 °C)   SpO2: 99%   Weight: (!) 346 lb (156.9 kg)   Height: 5' 7\" (1.702 m)       Physical exam reflects a pleasant female. She is moderately obese. She is currently afebrile with stable vital signs to include pulse ox of 99% on room air. She is not hypoxic. She is alert conversive and appropriate in behavior. Voice is slightly hoarse. Oral pharyngeal exam does demonstrate diffuse erythema. No uvular deviation trismus tenting or signs to suggest gross tonsillar abscess. Anterior cervical lymphadenopathy is noted. Trachea midline no stridor. Heart regular rate and rhythm normal S1-S2 no murmurs rubs gallops. Lungs are clear to auscultation without wheezes rales rhonchi. Abdomen is soft throughout. Integument without rash or lesion    DIAGNOSTIC RESULTS           LABS:  Labs Reviewed   STREP SCREEN GROUP A THROAT   MONONUCLEOSIS SCREEN   DIRECT EXAM.   Strep Screen Group A Throat   Collected: 06/14/19 2230   Resulting lab: [id number] [facility],[address] [facility]   Value: Rapid Strep A negative. A negative Rapid Group A Strep Screen result does not rule out the possibility of Group A Streptococci in the specimen. A Group A Strep DNA test is available upon request.     Results for Jamshid Hayes (MRN [id number]) as of 6/14/2019 23:03   Ref. Range 6/14/2019 22:30   Mononucleosis Screen Latest Ref Range: NEGATIVE  NEGATIVE     All other labs were within normal range or not returned as of this dictation. EMERGENCY DEPARTMENT COURSE and DIFFERENTIAL DIAGNOSIS/MDM:   Vitals:    Vitals:    06/14/19 2144   BP: (!) 145/86   Pulse: 94   Resp: 16   Temp: 98.6 °F (37 °C)   SpO2: 99%   Weight: (!) 346 lb (156.9 kg)   Height: 5' 7\" (1.702 m)     Patient is evaluated. Both strep and mono returned negative.   Patient is treated

## 2019-07-15 ENCOUNTER — HOSPITAL ENCOUNTER (EMERGENCY)
Age: 29
Discharge: HOME | End: 2019-07-15
Attending: EMERGENCY MEDICINE
Payer: MEDICARE

## 2019-07-15 ENCOUNTER — APPOINTMENT (OUTPATIENT)
Dept: GENERAL RADIOLOGY | Age: 29
End: 2019-07-15
Payer: MEDICARE

## 2019-07-15 VITALS
TEMPERATURE: 99.2 F | SYSTOLIC BLOOD PRESSURE: 154 MMHG | DIASTOLIC BLOOD PRESSURE: 87 MMHG | BODY MASS INDEX: 40.72 KG/M2 | HEART RATE: 93 BPM | WEIGHT: 260 LBS | RESPIRATION RATE: 14 BRPM

## 2019-07-15 DIAGNOSIS — J02.0 STREPTOCOCCAL SORE THROAT: Primary | ICD-10-CM

## 2019-07-15 LAB
DIRECT EXAM: ABNORMAL
Lab: ABNORMAL
SPECIMEN DESCRIPTION: ABNORMAL

## 2019-07-15 PROCEDURE — 6370000000 HC RX 637 (ALT 250 FOR IP): Performed by: STUDENT IN AN ORGANIZED HEALTH CARE EDUCATION/TRAINING PROGRAM

## 2019-07-15 PROCEDURE — 71046 X-RAY EXAM CHEST 2 VIEWS: CPT

## 2019-07-15 PROCEDURE — 6360000002 HC RX W HCPCS: Performed by: STUDENT IN AN ORGANIZED HEALTH CARE EDUCATION/TRAINING PROGRAM

## 2019-07-15 PROCEDURE — 99283 EMERGENCY DEPT VISIT LOW MDM: CPT

## 2019-07-15 PROCEDURE — 87880 STREP A ASSAY W/OPTIC: CPT

## 2019-07-15 PROCEDURE — 96372 THER/PROPH/DIAG INJ SC/IM: CPT

## 2019-07-15 RX ORDER — ACETAMINOPHEN 325 MG/1
650 TABLET ORAL EVERY 6 HOURS PRN
Qty: 30 TABLET | Refills: 0 | Status: SHIPPED | OUTPATIENT
Start: 2019-07-15 | End: 2020-07-15

## 2019-07-15 RX ORDER — ACETAMINOPHEN 500 MG
1000 TABLET ORAL ONCE
Status: COMPLETED | OUTPATIENT
Start: 2019-07-15 | End: 2019-07-15

## 2019-07-15 RX ADMIN — ACETAMINOPHEN 1000 MG: 500 TABLET ORAL at 17:51

## 2019-07-15 RX ADMIN — PENICILLIN G BENZATHINE AND PENICILLIN G PROCAINE 1.2 MILLION UNITS: 600000; 600000 INJECTION, SUSPENSION INTRAMUSCULAR at 18:48

## 2019-07-15 ASSESSMENT — PAIN DESCRIPTION - DESCRIPTORS: DESCRIPTORS: ACHING

## 2019-07-15 ASSESSMENT — ENCOUNTER SYMPTOMS
VOMITING: 0
SHORTNESS OF BREATH: 1
VOICE CHANGE: 0
SORE THROAT: 0
WHEEZING: 0
ABDOMINAL PAIN: 0
ABDOMINAL DISTENTION: 0
TROUBLE SWALLOWING: 0
NAUSEA: 0
SINUS PRESSURE: 0
SINUS PAIN: 0
BLOOD IN STOOL: 0
RHINORRHEA: 0
CHEST TIGHTNESS: 0
CONSTIPATION: 0
DIARRHEA: 0
COUGH: 0

## 2019-07-15 ASSESSMENT — PAIN DESCRIPTION - LOCATION: LOCATION: GENERALIZED

## 2019-07-15 ASSESSMENT — PAIN DESCRIPTION - FREQUENCY: FREQUENCY: INTERMITTENT

## 2019-07-15 ASSESSMENT — PAIN SCALES - GENERAL: PAINLEVEL_OUTOF10: 10

## 2019-07-15 ASSESSMENT — PAIN DESCRIPTION - PAIN TYPE: TYPE: ACUTE PAIN

## 2019-07-15 NOTE — ED PROVIDER NOTES
disease. SURGICAL HISTORY      has a past surgical history that includes Tonsillectomy and adenoidectomy and [place] tooth extraction. CURRENT MEDICATIONS       Current Discharge Medication List      CONTINUE these medications which have NOT CHANGED    Details   hydrOXYzine (VISTARIL) 50 MG capsule Refills: 0      FLUoxetine (PROZAC) 40 MG capsule Take 1 capsule by mouth daily  Qty: 30 capsule, Refills: 0      ziprasidone (GEODON) 40 MG capsule Take 1 capsule by mouth 2 times daily (with meals)  Qty: 60 capsule, Refills: 0      traZODone (DESYREL) 50 MG tablet Take 1 tablet by mouth nightly as needed for Sleep  Qty: 30 tablet, Refills: 0      ibuprofen (ADVIL;MOTRIN) 800 MG tablet Take 1 tablet by mouth every 8 hours as needed for Pain  Qty: 20 tablet, Refills: 0      metoprolol succinate (TOPROL XL) 25 MG extended release tablet Take 1 tablet by mouth daily  Qty: 30 tablet, Refills: 3    Associated Diagnoses: Essential hypertension      hydrochlorothiazide (HYDRODIURIL) 25 MG tablet Take 1 tablet by mouth daily  Qty: 30 tablet, Refills: 3    Associated Diagnoses: Essential hypertension      !! albuterol sulfate HFA (PROVENTIL HFA) 108 (90 Base) MCG/ACT inhaler Inhale 1-2 puffs into the lungs every 4 hours as needed for Wheezing or Shortness of Breath (Space out to every 6 hours as symptoms improve) Space out to every 6 hours as symptoms improve.   Qty: 1 Inhaler, Refills: 0      fluticasone (FLONASE) 50 MCG/ACT nasal spray 1 spray by Nasal route 2 times daily  Qty: 1 Bottle, Refills: 0      loratadine (CLARITIN) 10 MG tablet Take 1 tablet by mouth daily  Qty: 30 tablet, Refills: 0      guaiFENesin (MUCINEX) 600 MG extended release tablet Take 2 tablets by mouth 2 times daily  Qty: 30 tablet, Refills: 0      !! albuterol sulfate HFA (VENTOLIN HFA) 108 (90 Base) MCG/ACT inhaler Inhale 2 puffs into the lungs every 6 hours as needed for Wheezing  Qty: 1 Inhaler, Refills: 1      diphenhydrAMINE (BENADRYL) 25 MG

## 2019-08-08 ENCOUNTER — TELEPHONE (OUTPATIENT)
Dept: OBGYN | Age: 29
End: 2019-08-08

## 2019-11-25 ENCOUNTER — OFFICE VISIT (OUTPATIENT)
Dept: OBGYN | Age: 29
End: 2019-11-25
Payer: MEDICARE

## 2019-11-25 ENCOUNTER — HOSPITAL ENCOUNTER (OUTPATIENT)
Age: 29
Setting detail: SPECIMEN
Discharge: HOME | End: 2019-11-25
Payer: MEDICARE

## 2019-11-25 VITALS
WEIGHT: 293 LBS | DIASTOLIC BLOOD PRESSURE: 86 MMHG | BODY MASS INDEX: 45.99 KG/M2 | HEART RATE: 76 BPM | SYSTOLIC BLOOD PRESSURE: 120 MMHG | HEIGHT: 67 IN

## 2019-11-25 DIAGNOSIS — R10.2 PELVIC PAIN: ICD-10-CM

## 2019-11-25 DIAGNOSIS — Z11.3 ROUTINE SCREENING FOR STI (SEXUALLY TRANSMITTED INFECTION): ICD-10-CM

## 2019-11-25 DIAGNOSIS — R10.2 PELVIC PAIN: Primary | ICD-10-CM

## 2019-11-25 DIAGNOSIS — I10 CHRONIC HYPERTENSION: ICD-10-CM

## 2019-11-25 LAB
HEPATITIS C ANTIBODY: NONREACTIVE
HIV AG/AB: NONREACTIVE
T. PALLIDUM, IGG: NONREACTIVE

## 2019-11-25 PROCEDURE — 87491 CHLMYD TRACH DNA AMP PROBE: CPT

## 2019-11-25 PROCEDURE — 99213 OFFICE O/P EST LOW 20 MIN: CPT | Performed by: STUDENT IN AN ORGANIZED HEALTH CARE EDUCATION/TRAINING PROGRAM

## 2019-11-25 PROCEDURE — 87591 N.GONORRHOEAE DNA AMP PROB: CPT

## 2019-11-25 PROCEDURE — G8484 FLU IMMUNIZE NO ADMIN: HCPCS | Performed by: STUDENT IN AN ORGANIZED HEALTH CARE EDUCATION/TRAINING PROGRAM

## 2019-11-25 PROCEDURE — 87660 TRICHOMONAS VAGIN DIR PROBE: CPT

## 2019-11-25 PROCEDURE — 87389 HIV-1 AG W/HIV-1&-2 AB AG IA: CPT

## 2019-11-25 PROCEDURE — 4004F PT TOBACCO SCREEN RCVD TLK: CPT | Performed by: STUDENT IN AN ORGANIZED HEALTH CARE EDUCATION/TRAINING PROGRAM

## 2019-11-25 PROCEDURE — G8417 CALC BMI ABV UP PARAM F/U: HCPCS | Performed by: STUDENT IN AN ORGANIZED HEALTH CARE EDUCATION/TRAINING PROGRAM

## 2019-11-25 PROCEDURE — 86803 HEPATITIS C AB TEST: CPT

## 2019-11-25 PROCEDURE — 87510 GARDNER VAG DNA DIR PROBE: CPT

## 2019-11-25 PROCEDURE — 87480 CANDIDA DNA DIR PROBE: CPT

## 2019-11-25 PROCEDURE — 36415 COLL VENOUS BLD VENIPUNCTURE: CPT

## 2019-11-25 PROCEDURE — 86780 TREPONEMA PALLIDUM: CPT

## 2019-11-25 PROCEDURE — G8427 DOCREV CUR MEDS BY ELIG CLIN: HCPCS | Performed by: STUDENT IN AN ORGANIZED HEALTH CARE EDUCATION/TRAINING PROGRAM

## 2019-11-26 LAB
C TRACH DNA GENITAL QL NAA+PROBE: NEGATIVE
DIRECT EXAM: ABNORMAL
Lab: ABNORMAL
N. GONORRHOEAE DNA: NEGATIVE
SPECIMEN DESCRIPTION: ABNORMAL
SPECIMEN DESCRIPTION: NORMAL

## 2019-12-07 ENCOUNTER — TELEPHONE (OUTPATIENT)
Dept: OBGYN | Age: 29
End: 2019-12-07

## 2019-12-07 RX ORDER — METRONIDAZOLE 500 MG/1
500 TABLET ORAL 2 TIMES DAILY
Qty: 20 TABLET | Refills: 0 | Status: SHIPPED | OUTPATIENT
Start: 2019-12-07 | End: 2019-12-17

## 2020-01-15 ENCOUNTER — TELEPHONE (OUTPATIENT)
Dept: OBGYN | Age: 30
End: 2020-01-15

## 2020-02-11 ENCOUNTER — TELEPHONE (OUTPATIENT)
Dept: OBGYN | Age: 30
End: 2020-02-11

## 2020-02-11 NOTE — TELEPHONE ENCOUNTER
Patient no showed for 2/10/20 appointment at [address] office. Writer left voice message for patient to call the office to reschedule appointment.

## 2020-04-02 ENCOUNTER — HOSPITAL ENCOUNTER (EMERGENCY)
Age: 30
Discharge: HOME | End: 2020-04-02
Attending: EMERGENCY MEDICINE
Payer: MEDICARE

## 2020-04-02 VITALS
OXYGEN SATURATION: 100 % | HEART RATE: 75 BPM | BODY MASS INDEX: 52.47 KG/M2 | WEIGHT: 293 LBS | SYSTOLIC BLOOD PRESSURE: 150 MMHG | TEMPERATURE: 98.8 F | DIASTOLIC BLOOD PRESSURE: 95 MMHG | RESPIRATION RATE: 20 BRPM

## 2020-04-02 PROCEDURE — 6370000000 HC RX 637 (ALT 250 FOR IP): Performed by: STUDENT IN AN ORGANIZED HEALTH CARE EDUCATION/TRAINING PROGRAM

## 2020-04-02 PROCEDURE — 99282 EMERGENCY DEPT VISIT SF MDM: CPT

## 2020-04-02 RX ORDER — PENICILLIN V POTASSIUM 500 MG/1
500 TABLET ORAL 4 TIMES DAILY
Qty: 28 TABLET | Refills: 0 | Status: SHIPPED | OUTPATIENT
Start: 2020-04-02 | End: 2020-04-09

## 2020-04-02 RX ORDER — IBUPROFEN 800 MG/1
800 TABLET ORAL ONCE
Status: COMPLETED | OUTPATIENT
Start: 2020-04-02 | End: 2020-04-02

## 2020-04-02 RX ORDER — PENICILLIN V POTASSIUM 250 MG/1
500 TABLET ORAL ONCE
Status: COMPLETED | OUTPATIENT
Start: 2020-04-02 | End: 2020-04-02

## 2020-04-02 RX ORDER — IBUPROFEN 800 MG/1
800 TABLET ORAL EVERY 8 HOURS PRN
Qty: 30 TABLET | Refills: 0 | Status: SHIPPED | OUTPATIENT
Start: 2020-04-02 | End: 2020-07-15

## 2020-04-02 RX ADMIN — IBUPROFEN 800 MG: 800 TABLET, FILM COATED ORAL at 14:01

## 2020-04-02 RX ADMIN — PENICILLIN V POTASSIUM 500 MG: 250 TABLET ORAL at 14:01

## 2020-04-02 ASSESSMENT — ENCOUNTER SYMPTOMS
COUGH: 0
NAUSEA: 0
VOMITING: 0
WHEEZING: 0
SHORTNESS OF BREATH: 0
ABDOMINAL PAIN: 0

## 2020-04-02 ASSESSMENT — PAIN SCALES - GENERAL
PAINLEVEL_OUTOF10: 10
PAINLEVEL_OUTOF10: 10

## 2020-04-02 ASSESSMENT — PAIN DESCRIPTION - DESCRIPTORS: DESCRIPTORS: ACHING

## 2020-04-02 ASSESSMENT — PAIN DESCRIPTION - ORIENTATION: ORIENTATION: LEFT

## 2020-04-02 ASSESSMENT — PAIN DESCRIPTION - PAIN TYPE: TYPE: ACUTE PAIN

## 2020-04-02 ASSESSMENT — PAIN DESCRIPTION - LOCATION: LOCATION: JAW

## 2020-05-05 ENCOUNTER — HOSPITAL ENCOUNTER (EMERGENCY)
Age: 30
Discharge: HOME | End: 2020-05-05
Attending: EMERGENCY MEDICINE
Payer: MEDICARE

## 2020-05-05 VITALS
HEART RATE: 95 BPM | DIASTOLIC BLOOD PRESSURE: 96 MMHG | HEIGHT: 62 IN | TEMPERATURE: 98.5 F | OXYGEN SATURATION: 100 % | SYSTOLIC BLOOD PRESSURE: 144 MMHG | BODY MASS INDEX: 48.76 KG/M2 | WEIGHT: 265 LBS | RESPIRATION RATE: 16 BRPM

## 2020-05-05 PROCEDURE — 6370000000 HC RX 637 (ALT 250 FOR IP): Performed by: STUDENT IN AN ORGANIZED HEALTH CARE EDUCATION/TRAINING PROGRAM

## 2020-05-05 PROCEDURE — 99282 EMERGENCY DEPT VISIT SF MDM: CPT

## 2020-05-05 RX ORDER — ACETAMINOPHEN 325 MG/1
650 TABLET ORAL ONCE
Status: COMPLETED | OUTPATIENT
Start: 2020-05-05 | End: 2020-05-05

## 2020-05-05 RX ORDER — IBUPROFEN 400 MG/1
400 TABLET ORAL ONCE
Status: COMPLETED | OUTPATIENT
Start: 2020-05-05 | End: 2020-05-05

## 2020-05-05 RX ORDER — PENICILLIN V POTASSIUM 500 MG/1
500 TABLET ORAL 4 TIMES DAILY
Qty: 28 TABLET | Refills: 0 | Status: SHIPPED | OUTPATIENT
Start: 2020-05-05 | End: 2020-05-12

## 2020-05-05 RX ORDER — PENICILLIN V POTASSIUM 250 MG/1
500 TABLET ORAL ONCE
Status: COMPLETED | OUTPATIENT
Start: 2020-05-05 | End: 2020-05-05

## 2020-05-05 RX ADMIN — IBUPROFEN 400 MG: 400 TABLET, FILM COATED ORAL at 14:13

## 2020-05-05 RX ADMIN — PENICILLIN V POTASSIUM 500 MG: 250 TABLET ORAL at 14:13

## 2020-05-05 RX ADMIN — ACETAMINOPHEN 650 MG: 325 TABLET ORAL at 14:12

## 2020-05-05 ASSESSMENT — ENCOUNTER SYMPTOMS
VOMITING: 0
NAUSEA: 0
ABDOMINAL PAIN: 0
CONSTIPATION: 0
SHORTNESS OF BREATH: 0
PHOTOPHOBIA: 0
BACK PAIN: 0
ABDOMINAL DISTENTION: 0
DIARRHEA: 0
TROUBLE SWALLOWING: 0
RHINORRHEA: 0
CHEST TIGHTNESS: 0
SORE THROAT: 0
WHEEZING: 0

## 2020-05-05 ASSESSMENT — PAIN DESCRIPTION - FREQUENCY: FREQUENCY: CONTINUOUS

## 2020-05-05 ASSESSMENT — PAIN DESCRIPTION - DESCRIPTORS: DESCRIPTORS: THROBBING;SHARP

## 2020-05-05 ASSESSMENT — PAIN SCALES - GENERAL
PAINLEVEL_OUTOF10: 10

## 2020-05-05 ASSESSMENT — PAIN DESCRIPTION - ORIENTATION: ORIENTATION: LEFT

## 2020-05-05 ASSESSMENT — PAIN DESCRIPTION - PAIN TYPE: TYPE: ACUTE PAIN

## 2020-05-05 ASSESSMENT — PAIN DESCRIPTION - LOCATION: LOCATION: TEETH

## 2020-05-05 NOTE — ED PROVIDER NOTES
[facility]  ED  Emergency Department Encounter  Emergency Medicine Resident     Pt Name: Adina Dickerson  MRN: [id number]  [place] 1990  Date of evaluation: 5/5/20  PCP:  Elmira Isidro MD    [address] [place] [address]       Chief Complaint   Patient presents with    Dental Pain     Left bottom back tooth broken       HISTORY OFPRESENT ILLNESS  (Location/Symptom, Timing/Onset, Context/Setting, Quality, Duration, Modifying Factors,Severity.)      Adina Dickerson is a 34 yo female who presents with left bottom back tooth pain of 2 weeks duration. Patient was seen for similar symptoms at the beginning of April, states that she has been unable to to make an appointment with a dentist secondary to [place] pandemic. Patient denies fever, chills, shortness of breath, states that she still able to eat and drink but that the pain is causing her discomfort. No obvious area of fluctuance or drainable abscess noted on exam, no tender lymphadenopathy, no murmurs noted. Plan treat penicillin    PAST MEDICAL / SURGICAL / SOCIAL / FAMILY HISTORY      has a past medical history of Asthma, Bipolar disorder, mixed ([address] Cannabis abuse, Chronic hypertension, Depression, Diabetes mellitus ([address] Obesity, and Polycystic ovarian disease. has a past surgical history that includes Tonsillectomy and adenoidectomy and [place] tooth extraction.      Social History     Socioeconomic History    Marital status: Single     Spouse name: Not on file    Number of children: Not on file    Years of education: Not on file    Highest education level: Not on file   Occupational History    Not on file   Social Needs    Financial resource strain: Not on file    Food insecurity     Worry: Not on file     Inability: Not on file    Transportation needs     Medical: Not on file     Non-medical: Not on file   Tobacco Use    Smoking status: Current Every Day Smoker     Packs/day: 0.50     Years: 3.00     Pack years: 1.50     Types: succinate (TOPROL XL) 25 MG extended release tablet Take 1 tablet by mouth daily 1/8/19   Tali Magdaleno MD   hydrOXYzine (VISTARIL) 50 MG capsule  12/3/18   Dio Collazo MD   hydrochlorothiazide (HYDRODIURIL) 25 MG tablet Take 1 tablet by mouth daily 11/30/18   Luis Arguello MD   albuterol sulfate HFA (PROVENTIL HFA) 108 (90 Base) MCG/ACT inhaler Inhale 1-2 puffs into the lungs every 4 hours as needed for Wheezing or Shortness of Breath (Space out to every 6 hours as symptoms improve) Space out to every 6 hours as symptoms improve. 7/3/18   Ana Lilia Frank DO   fluticasone (FLONASE) 50 MCG/ACT nasal spray 1 spray by Nasal route 2 times daily 5/26/18   Shanika Woodruff DO   guaiFENesin (MUCINEX) 600 MG extended release tablet Take 2 tablets by mouth 2 times daily 9/11/17   Jalene Hodgkin, MD   albuterol sulfate HFA (VENTOLIN HFA) 108 (90 Base) MCG/ACT inhaler Inhale 2 puffs into the lungs every 6 hours as needed for Wheezing 9/11/17   Jalene Hodgkin, MD   diphenhydrAMINE (BENADRYL) 25 MG capsule Take 1 capsule by mouth every 4 hours as needed for Itching 7/9/17   Anna [facility] DHARMESH Cullen   FLUoxetine (PROZAC) 40 MG capsule Take 1 capsule by mouth daily 1/8/17   Maia Hinojosa MD   ziprasidone (GEODON) 40 MG capsule Take 1 capsule by mouth 2 times daily (with meals) 1/8/17   Maia Hinojosa MD   traZODone (DESYREL) 50 MG tablet Take 1 tablet by mouth nightly as needed for Sleep 1/8/17   Maia Hinojosa MD       REVIEW OFSYSTEMS    (2-9 systems for level 4, 10 or more for level 5)      Review of Systems   Constitutional: Negative for chills, fatigue and fever. HENT: Positive for dental problem. Negative for hearing loss, rhinorrhea, sneezing, sore throat, tinnitus and trouble swallowing. Eyes: Negative for photophobia and visual disturbance. Respiratory: Negative for chest tightness, shortness of breath and wheezing. Cardiovascular: Negative for chest pain and palpitations.    Gastrointestinal: IMAGING / EKG):  No orders of the defined types were placed in this encounter. MEDICATIONS ORDERED:  Orders Placed This Encounter   Medications    DISCONTD: benzocaine (ORAJEL) 10 % mucosal gel    penicillin v potassium (VEETID) tablet 500 mg    acetaminophen (TYLENOL) tablet 650 mg    ibuprofen (ADVIL;MOTRIN) tablet 400 mg    penicillin v potassium (VEETID) 500 MG tablet     Sig: Take 1 tablet by mouth 4 times daily for 7 days     Dispense:  28 tablet     Refill:  0       DDX: Dental cavity, dental caries, pulpitis, gingivitis, dental problem    Initial MDM/Plan: 35 y.o. female who presents with tooth pain in the setting of a cracked tooth, plan to treat with Orajel, oral potassium, have patient follow-up with dentist with nursing making a dental appointment    DIAGNOSTIC RESULTS / Lesly Roles COURSE / MDM     LABS:  Labs Reviewed - No data to display      RADIOLOGY:  No results found. EMERGENCY DEPARTMENT COURSE:    Patient treated with penicillin, Orajel, patient cleared for discharge with dental appointment scheduled outpatient. Patient no questions at time of discharge      PROCEDURES:  None    CONSULTS:  None    CRITICAL CARE:  Please see attending note    FINAL IMPRESSION      1.  Pain, dental          DISPOSITION / PLAN     DISPOSITION Decision To Discharge 05/05/2020 02:12:36 PM      PATIENT REFERRED TO:  [facility]  [address] [address]  [phone]    As needed    Tiffany Arcos MD  [address] [place] [address]  [phone]      As needed      DISCHARGE MEDICATIONS:  Discharge Medication List as of 5/5/2020  2:12 PM      START taking these medications    Details   penicillin v potassium (VEETID) 500 MG tablet Take 1 tablet by mouth 4 times daily for 7 days, Disp-28 tablet, R-0Print             Rajwinder Melgar MD  Emergency Medicine Resident    (Please note that portions of this note were completed with a voice recognition program.Efforts were made to edit the dictations but occasionally words are mis-transcribed.)       Nii Doyle MD  Resident  05/05/20 9100

## 2020-05-06 ENCOUNTER — TELEPHONE (OUTPATIENT)
Dept: OBGYN CLINIC | Age: 30
End: 2020-05-06

## 2020-05-06 ENCOUNTER — CARE COORDINATION (OUTPATIENT)
Dept: CARE COORDINATION | Age: 30
End: 2020-05-06

## 2020-05-06 NOTE — TELEPHONE ENCOUNTER
Patient LOOP enrolled    Care Coordinator noted patient went in for dental pain and sees no need for PCP f/u but f/u w/ dentist.

## 2020-06-23 ENCOUNTER — TELEPHONE (OUTPATIENT)
Dept: FAMILY MEDICINE CLINIC | Age: 30
End: 2020-06-23

## 2020-06-26 ENCOUNTER — HOSPITAL ENCOUNTER (OUTPATIENT)
Age: 30
Setting detail: SPECIMEN
Discharge: HOME | End: 2020-06-26
Payer: MEDICARE

## 2020-06-26 ENCOUNTER — OFFICE VISIT (OUTPATIENT)
Dept: PODIATRY | Age: 30
End: 2020-06-26
Payer: MEDICAID

## 2020-06-26 VITALS — BODY MASS INDEX: 45.99 KG/M2 | TEMPERATURE: 97.7 F | HEIGHT: 67 IN | WEIGHT: 293 LBS

## 2020-06-26 PROCEDURE — G8417 CALC BMI ABV UP PARAM F/U: HCPCS | Performed by: PODIATRIST

## 2020-06-26 PROCEDURE — G8427 DOCREV CUR MEDS BY ELIG CLIN: HCPCS | Performed by: PODIATRIST

## 2020-06-26 PROCEDURE — 11102 TANGNTL BX SKIN SINGLE LES: CPT | Performed by: PODIATRIST

## 2020-06-26 PROCEDURE — 4004F PT TOBACCO SCREEN RCVD TLK: CPT | Performed by: PODIATRIST

## 2020-06-26 PROCEDURE — 99203 OFFICE O/P NEW LOW 30 MIN: CPT | Performed by: PODIATRIST

## 2020-06-26 RX ORDER — CLOTRIMAZOLE AND BETAMETHASONE DIPROPIONATE 10; .64 MG/G; MG/G
CREAM TOPICAL
Qty: 45 G | Refills: 2 | Status: SHIPPED | OUTPATIENT
Start: 2020-06-26 | End: 2020-08-18 | Stop reason: ALTCHOICE
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
## 2020-06-30 LAB — DERMATOLOGY PATHOLOGY REPORT: NORMAL

## 2020-07-15 ENCOUNTER — HOSPITAL ENCOUNTER (EMERGENCY)
Age: 30
Discharge: HOME | End: 2020-07-15
Attending: EMERGENCY MEDICINE
Payer: MEDICAID

## 2020-07-15 VITALS
SYSTOLIC BLOOD PRESSURE: 152 MMHG | OXYGEN SATURATION: 99 % | HEART RATE: 91 BPM | DIASTOLIC BLOOD PRESSURE: 90 MMHG | BODY MASS INDEX: 36.1 KG/M2 | WEIGHT: 230 LBS | TEMPERATURE: 98.5 F | HEIGHT: 67 IN | RESPIRATION RATE: 16 BRPM

## 2020-07-15 PROCEDURE — 96372 THER/PROPH/DIAG INJ SC/IM: CPT

## 2020-07-15 PROCEDURE — 6370000000 HC RX 637 (ALT 250 FOR IP): Performed by: STUDENT IN AN ORGANIZED HEALTH CARE EDUCATION/TRAINING PROGRAM

## 2020-07-15 PROCEDURE — 6360000002 HC RX W HCPCS: Performed by: STUDENT IN AN ORGANIZED HEALTH CARE EDUCATION/TRAINING PROGRAM

## 2020-07-15 PROCEDURE — 99282 EMERGENCY DEPT VISIT SF MDM: CPT

## 2020-07-15 RX ORDER — CYCLOBENZAPRINE HCL 5 MG
5 TABLET ORAL 2 TIMES DAILY PRN
Qty: 10 TABLET | Refills: 0 | Status: SHIPPED | OUTPATIENT
Start: 2020-07-15 | End: 2020-07-25

## 2020-07-15 RX ORDER — ORPHENADRINE CITRATE 30 MG/ML
60 INJECTION INTRAMUSCULAR; INTRAVENOUS ONCE
Status: COMPLETED | OUTPATIENT
Start: 2020-07-15 | End: 2020-07-15

## 2020-07-15 RX ORDER — ACETAMINOPHEN 500 MG
500 TABLET ORAL 4 TIMES DAILY PRN
Qty: 30 TABLET | Refills: 0 | Status: SHIPPED | OUTPATIENT
Start: 2020-07-15 | End: 2020-08-11

## 2020-07-15 RX ORDER — KETOROLAC TROMETHAMINE 30 MG/ML
30 INJECTION, SOLUTION INTRAMUSCULAR; INTRAVENOUS ONCE
Status: COMPLETED | OUTPATIENT
Start: 2020-07-15 | End: 2020-07-15

## 2020-07-15 RX ORDER — ACETAMINOPHEN 500 MG
1000 TABLET ORAL ONCE
Status: COMPLETED | OUTPATIENT
Start: 2020-07-15 | End: 2020-07-15

## 2020-07-15 RX ORDER — IBUPROFEN 600 MG/1
600 TABLET ORAL EVERY 6 HOURS PRN
Qty: 30 TABLET | Refills: 0 | Status: SHIPPED | OUTPATIENT
Start: 2020-07-15 | End: 2020-08-11

## 2020-07-15 RX ADMIN — ACETAMINOPHEN 1000 MG: 500 TABLET ORAL at 20:55

## 2020-07-15 RX ADMIN — ORPHENADRINE CITRATE 60 MG: 60 INJECTION INTRAMUSCULAR; INTRAVENOUS at 20:55

## 2020-07-15 RX ADMIN — KETOROLAC TROMETHAMINE 30 MG: 30 INJECTION, SOLUTION INTRAMUSCULAR at 20:55

## 2020-07-15 ASSESSMENT — PAIN DESCRIPTION - LOCATION: LOCATION: BACK

## 2020-07-15 ASSESSMENT — PAIN SCALES - GENERAL
PAINLEVEL_OUTOF10: 10
PAINLEVEL_OUTOF10: 10

## 2020-07-15 ASSESSMENT — PAIN DESCRIPTION - ORIENTATION: ORIENTATION: LEFT

## 2020-07-15 ASSESSMENT — PAIN DESCRIPTION - PAIN TYPE: TYPE: ACUTE PAIN

## 2020-07-16 NOTE — ED TRIAGE NOTES
Pt arrived to the ED with c/o back pain. Pt states she was moving a night stand and pulled a muscle in the back on the left side. Pt is alert and oriented x4. Pt denies to taking anything for pain at home.

## 2020-07-16 NOTE — ED PROVIDER NOTES
[address]  Emergency Department Encounter  Emergency Medicine Resident       This patient was evaluated in the Emergency Department for symptoms described in the history of present illness. He/she was evaluated in the context of the global COVID-19 pandemic, which necessitated consideration that the patient might be at risk for infection with the SARS-CoV-2 virus that causes COVID-19. Institutional protocols and algorithms that pertain to the evaluation of patients at risk for COVID-19 are in a state of rapid change based on information released by regulatory bodies including the CDC and federal and state organizations. These policies and algorithms were followed during the patient's care in the ED. Pt Name: Ashleigh Antunez  MRN: [id number]  [place] 1990  Date of evaluation: 7/15/20  PCP:  Rachid Kuhn MD    [address] [place] [address]       Chief Complaint   Patient presents with    Back Pain       HISTORY OF PRESENT ILLNESS  (Location/Symptom, Timing/Onset, Context/Setting, Quality, Duration, Modifying Factors, Severity.)    Ashleigh Antunez is a 35 y.o. female  who presents with left lower back pain started at noon. Patient was lifting a table and twisted in a funny way she had the sudden onset of left-sided muscle spasm over her back. Achy 9 out of 10 severity constant worse with movement. No treatments attempted prior to arrival.  Denies diaphoresis syncope fever new lower extremity numbness or weakness urinary or bowel retention or incontinence perineal or saddle anesthesia. No recent spinal anesthesia, spinal surgery, or IVDA. No trauma, history of cancer, AC use, prolonged steroid use, hx of osteoporosis, hx of AAA, unrelenting night or rest pain, unexplained weight loss, immunocompromised status, hx of bicuspid aortic valve, connective tissue disorder, lizama syndrome, recent cardiac surgery/cath, or pregnancy.        PAST MEDICAL / SURGICAL / SOCIAL / FAMILY HISTORY    has a past medical history of Asthma, Bipolar disorder, mixed ([address] Cannabis abuse, Chronic hypertension, Depression, Diabetes mellitus ([address] Obesity, and Polycystic ovarian disease. has a past surgical history that includes Tonsillectomy and adenoidectomy and [place] tooth extraction.     Social History     Socioeconomic History    Marital status: Single     Spouse name: Not on file    Number of children: Not on file    Years of education: Not on file    Highest education level: Not on file   Occupational History    Not on file   Social Needs    Financial resource strain: Not on file    Food insecurity     Worry: Not on file     Inability: Not on file    Transportation needs     Medical: Not on file     Non-medical: Not on file   Tobacco Use    Smoking status: Current Every Day Smoker     Packs/day: 0.50     Years: 3.00     Pack years: 1.50     Types: Cigarettes    Smokeless tobacco: Never Used    Tobacco comment: Accepting of Nicotine replacement: Patch    Substance and Sexual Activity    Alcohol use: Yes     Comment: socially    Drug use: No    Sexual activity: Not Currently     Partners: Female, Male   Lifestyle    Physical activity     Days per week: Not on file     Minutes per session: Not on file    Stress: Not on file   Relationships    Social connections     Talks on phone: Not on file     Gets together: Not on file     Attends [employer] service: Not on file     Active member of club or organization: Not on file     Attends meetings of clubs or organizations: Not on file     Relationship status: Not on file    Intimate partner violence     Fear of current or ex partner: Not on file     Emotionally abused: Not on file     Physically abused: Not on file     Forced sexual activity: Not on file   Other Topics Concern    Not on file   Social History Narrative    Not on file       Family History   Problem Relation Age of Onset    High Blood Pressure Mother     Diabetes Maternal Grandfather    [facility] High Blood Pressure Maternal Grandfather     Schizophrenia Maternal Aunt     Bipolar Disorder Maternal Aunt     Schizophrenia Maternal Aunt     Colon Cancer Neg Hx     Breast Cancer Neg Hx     Uterine Cancer Neg Hx     Ovarian Cancer Neg Hx        Allergies:    Latex; Fruit & vegetable daily [nutritional supplements]; and Seasonal    Home Medications:  Prior to Admission medications    Medication Sig Start Date End Date Taking? Authorizing Provider   cyclobenzaprine (FLEXERIL) 5 MG tablet Take 1 tablet by mouth 2 times daily as needed for Muscle spasms 7/15/20 7/25/20 Yes Chato Copping, DO   acetaminophen (TYLENOL) 500 MG tablet Take 1 tablet by mouth 4 times daily as needed for Pain 7/15/20  Yes Chato Copping, DO   ibuprofen (ADVIL;MOTRIN) 600 MG tablet Take 1 tablet by mouth every 6 hours as needed for Pain 7/15/20  Yes Cj Romeo, DO   clotrimazole-betamethasone (LOTRISONE) 1-0.05 % cream Apply topically 2 times daily. 6/26/20   Adrian Hawk DPM   Misc. Devices MISC 1 PAIR OF DIABETIC SHOES (1 LEFT/ 1 RIGHT)  1-3 PAIRS OF INSERTS (LEFT/ RIGHT) 6/26/20   Adrian Hawk DPM   metoprolol succinate (TOPROL XL) 25 MG extended release tablet Take 1 tablet by mouth daily 1/8/19   Adilson Harvey MD   hydrOXYzine (VISTARIL) 50 MG capsule  12/3/18   Historical Provider, MD   hydrochlorothiazide (HYDRODIURIL) 25 MG tablet Take 1 tablet by mouth daily 11/30/18   Luis Alvarado MD   albuterol sulfate HFA (PROVENTIL HFA) 108 (90 Base) MCG/ACT inhaler Inhale 1-2 puffs into the lungs every 4 hours as needed for Wheezing or Shortness of Breath (Space out to every 6 hours as symptoms improve) Space out to every 6 hours as symptoms improve.  7/3/18   Julio Lyles DO   fluticasone (FLONASE) 50 MCG/ACT nasal spray 1 spray by Nasal route 2 times daily 5/26/18   Earnest Loaiza DO   guaiFENesin [facility] [place] [facility]\"") 600 MG extended release tablet Take 2 tablets by mouth 2 times daily 9/11/17   Karine Harris MD   albuterol ketorolac (TORADOL) injection 30 mg    orphenadrine (NORFLEX) injection 60 mg    acetaminophen (TYLENOL) tablet 1,000 mg           DIAGNOSTIC RESULTS / EMERGENCYDEPARTMENT COURSE / MDM   LABS:  Labs Reviewed - No data to display    RADIOLOGY:  No results found. EMERGENCY DEPARTMENT COURSE / MDM / DDX:      MDM:  43-year-old female presenting with left lower back pain starting after twisting at noon. Focal reproducible tenderness with obvious muscle spasm over left lower paraspinal area in the lumbar region. No focal neurological deficits non-ataxic or wide-based gait. No red flag symptoms as patient drove here will not give sedating medication, Norflex Toradol Tylenol scripts for Tylenol ibuprofen and Flexeril discharge. The patient presents with low back pain without signs of spinal cord compression, cauda equina syndrome, infection, aneurysm or other serious etiology. The patient is neurologically intact. Given the extremely low risk of these diagnoses further testing and evaluation for these possibilities does not appear to be indicated at this time. The patient has been instructed to return if the symptoms worsen or change in any way. I have reviewed the disposition diagnosis with the patient and or their family/guardian. I have answered their questions and given discharge instructions. They voiced understanding of these instructions and did not have any further questions or complaints. CONSULTS:  None    CRITICAL CARE:  Please see attending note    FINAL IMPRESSION     1. Strain of lumbar region, initial encounter          DISPOSITION / PLAN   DISPOSITION Decision To Discharge 07/15/2020 09:10:09 PM       Evaluation and treatment course in the ED, and plan of care upon discharge was discussed in length with the patient. Patient had no further questions prior to being discharged and was instructed to return to the ED for new or worsening symptoms.   Any changes to existing medications or new prescriptions were reviewed with patient and they expressed understanding of how to correctly take their medications and the possible side effects. The patient understands that at this time there is no evidence for a more malignant underlying process, but the patient also understands that early in the process of an illness or injury, an emergency department workup can be falsely reassuring. Routine discharge counseling was given, and the patient understands that worsening, changing or persistent symptoms should prompt an immediate call or follow up with his/her primary physician or return to the emergency department. The importance of appropriate follow up was also discussed. More extensive discharge instructions were given in the patients discharge paperwork. PATIENT REFERRED TO:  No follow-up provider specified. DISCHARGE MEDICATIONS:  New Prescriptions    ACETAMINOPHEN (TYLENOL) 500 MG TABLET    Take 1 tablet by mouth 4 times daily as needed for Pain    CYCLOBENZAPRINE (FLEXERIL) 5 MG TABLET    Take 1 tablet by mouth 2 times daily as needed for Muscle spasms    IBUPROFEN (ADVIL;MOTRIN) 600 MG TABLET    Take 1 tablet by mouth every 6 hours as needed for Pain       Dr. Jyoti Feliz.  [address] [place] [address]  Emergency Medicine Resident Physician, PGY-3    (Please note that portions of this note were completed with a voice recognition program.  Efforts were made to edit the dictations but occasionally words are mis-transcribed.)          Echo Carrillo DO  Resident  07/15/20 9078

## 2020-07-16 NOTE — ED PROVIDER NOTES
[facility] [facility]                                      Emergency Department                                      Faculty Attestation                                         I performed a history and physical examination of the patient and discussed management with the resident. I reviewed the residents note and agree with the documented findings and plan of care. Any areas of disagreement are noted on the chart. I was personally present for the key portions of any procedures. I have documented in the chart those procedures where I was not present during the key portions. I agree with the chief complaint, past medical history, past surgical history, allergies, medications, social and family history as documented unless otherwise noted below. For mid-level providers such as nurse practitioners as well as physicians assistants:    I have personally seen and evaluated the patient. I find the patient's history and physical exam are consistent with NP/PA documentation. I agree with the care provided, treatment rendered, disposition, & follow-up plan. Additional findings are as noted  89 female with left lateral back pain. At noon today patient was moving a dresser when she felt a sudden electric shocks in the left back. No trauma, did not hear anything like a pulling or a breaking sound. No red flags from the symptoms. Tender palpation over the left thoracic posterior  musculature. No bony tenderness. Neurologically intact.      Elmira Crowe DO  07/15/20 [id number]

## 2020-07-24 ENCOUNTER — OFFICE VISIT (OUTPATIENT)
Dept: PODIATRY | Age: 30
End: 2020-07-24
Payer: MEDICAID

## 2020-07-24 VITALS — BODY MASS INDEX: 36.1 KG/M2 | HEIGHT: 67 IN | WEIGHT: 230 LBS | TEMPERATURE: 97.3 F

## 2020-07-24 PROCEDURE — 4004F PT TOBACCO SCREEN RCVD TLK: CPT | Performed by: PODIATRIST

## 2020-07-24 PROCEDURE — 99213 OFFICE O/P EST LOW 20 MIN: CPT | Performed by: PODIATRIST

## 2020-07-24 PROCEDURE — G8427 DOCREV CUR MEDS BY ELIG CLIN: HCPCS | Performed by: PODIATRIST

## 2020-07-24 PROCEDURE — G8417 CALC BMI ABV UP PARAM F/U: HCPCS | Performed by: PODIATRIST

## 2020-07-24 RX ORDER — CLINDAMYCIN PHOSPHATE 10 MG/G
GEL TOPICAL
Qty: 1 TUBE | Refills: 3 | Status: SHIPPED | OUTPATIENT
Start: 2020-07-24 | End: 2020-07-31

## 2020-07-24 RX ORDER — DIAPER,BRIEF,INFANT-TODD,DISP
EACH MISCELLANEOUS
Qty: 1 TUBE | Refills: 1 | Status: SHIPPED | OUTPATIENT
Start: 2020-07-24 | End: 2020-07-31

## 2020-07-24 NOTE — PROGRESS NOTES
[facility] PHYSICIANS  [facility]  [address] [place] [address]  Dept: [phone]  Dept Fax: [phone]    RETURN PATIENT PROGRESS NOTE  Date of patient's visit: 7/24/2020  Patient's Name:  Mayur Mckinney YOB: 1990            Patient Care Team:  Paulo Sabillon MD as PCP - General (Internal Medicine)  Erica Mclaughlin DPM as Physician (Podiatry)       Mayur Mckinney 35 y.o. female that presents for follow-up of   Chief Complaint   Patient presents with    Foot Pain    Blister    Ankle Pain     Right       Symptoms began several week(s) ago and are unchanged . She relates to blister to the left foot with dry itchy skin. Patient relates pain is Present. Pain is rated 10 out of 10 and is described as constant, moderate, severe. Treatments prior to today's visit include: previous podiatry treatment. Currently denies F/C/N/V. Allergies   Allergen Reactions    Latex      Rash     Fruit & Vegetable Daily [Nutritional Supplements]      Cataloupe,honeydew,grapes,watermelon,bananas,apples,pineapples    Seasonal        Past Medical History:   Diagnosis Date    Asthma     Has not needed inhaler since 2009    [facility] disorder, mixed [facility] [address]     Cannabis abuse 12/31/2016    Chronic hypertension 11/25/2019    Depression     Diabetes mellitus ([facility] [address] 11/20/2013    borderline    Obesity 8/1/2013    Polycystic ovarian disease        Prior to Admission medications    Medication Sig Start Date End Date Taking?  Authorizing Provider   cyclobenzaprine (FLEXERIL) 5 MG tablet Take 1 tablet by mouth 2 times daily as needed for Muscle spasms 7/15/20 7/25/20 Yes Mike Somers DO   acetaminophen (TYLENOL) 500 MG tablet Take 1 tablet by mouth 4 times daily as needed for Pain 7/15/20  Yes Mike Somers DO   ibuprofen (ADVIL;MOTRIN) 600 MG tablet Take 1 tablet by mouth every 6 hours as needed for Pain 7/15/20  Yes Mike Somers DO   clotrimazole-betamethasone (Violeta Ramos) 1-0.05 % cream Apply topically 2 times daily. 6/26/20  Yes Chin Barnhart DPM   Misc. Devices MISC 1 PAIR OF DIABETIC SHOES (1 LEFT/ 1 RIGHT)  1-3 PAIRS OF INSERTS (LEFT/ RIGHT) 6/26/20  Yes Chin Barnhart DPM   metoprolol succinate (TOPROL XL) 25 MG extended release tablet Take 1 tablet by mouth daily 1/8/19  Yes Shyann Brown MD   hydrOXYzine (VISTARIL) 50 MG capsule  12/3/18  Yes Dio Collazo MD   hydrochlorothiazide (HYDRODIURIL) 25 MG tablet Take 1 tablet by mouth daily 11/30/18  Yes Shyann Brown MD   albuterol sulfate HFA (PROVENTIL HFA) 108 (90 Base) MCG/ACT inhaler Inhale 1-2 puffs into the lungs every 4 hours as needed for Wheezing or Shortness of Breath (Space out to every 6 hours as symptoms improve) Space out to every 6 hours as symptoms improve.  7/3/18  Yes Cayla Gauthier DO   fluticasone (FLONASE) 50 MCG/ACT nasal spray 1 spray by Nasal route 2 times daily 5/26/18  Yes Alma Mcmahon DO   guaiFENesin (MUCINEX) 600 MG extended release tablet Take 2 tablets by mouth 2 times daily 9/11/17  Yes Essie Kendall MD   albuterol sulfate HFA (VENTOLIN HFA) 108 (90 Base) MCG/ACT inhaler Inhale 2 puffs into the lungs every 6 hours as needed for Wheezing 9/11/17  Yes Essie Kendall MD   diphenhydrAMINE (BENADRYL) 25 MG capsule Take 1 capsule by mouth every 4 hours as needed for Itching 7/9/17  Yes Anna [facility] Subhash, PA-C   FLUoxetine (PROZAC) 40 MG capsule Take 1 capsule by mouth daily 1/8/17  Yes Adal Slater MD   ziprasidone (GEODON) 40 MG capsule Take 1 capsule by mouth 2 times daily (with meals) 1/8/17  Yes Adal Slater MD   traZODone (DESYREL) 50 MG tablet Take 1 tablet by mouth nightly as needed for Sleep 1/8/17  Yes Adal Slater MD       Review of Systems    Review of Systems:  History obtained from chart review and the patient  General ROS: negative for - chills, fatigue, fever, night sweats or weight gain  Constitutional: Negative for chills, diaphoresis, fatigue, fever and unexpected weight change. Musculoskeletal: Positive for arthralgias, gait problem and joint swelling. Neurological ROS: negative for - behavioral changes, confusion, headaches or seizures. Negative for weakness and numbness. Dermatological ROS: negative for - mole changes, rash  Cardiovascular: Negative for leg swelling. Gastrointestinal: Negative for constipation, diarrhea, nausea and vomiting. Lower Extremity Physical Examination:     Vitals:   Vitals:    07/24/20 0832   Temp: 97.3 °F (36.3 °C)     General: AAO x 3 in NAD. Dermatologic Exam:  Plantar skin, left foot is dry, scaley, with superficial blisters noted that are fluid filled. Musculoskeletal:     1st MPJ ROM decreased, Bilateral.  Muscle strength 5/5, Bilateral.  Pain present upon palpation of skin lesions. Medial longitudinal arch, Bilateral WNL. Ankle ROM WNL,Bilateral.    Dorsally contracted digits absent digits 1-5 Bilateral.     Vascular: DP and PT pulses palpable 2/4, Bilateral.  CFT <3 seconds, Bilateral.  Hair growth present to the level of the digits, Bilateral.  Edema absent, Bilateral.  Varicosities absent, Bilateral. Erythema absent, Bilateral    Neurological: Sensation intact to light touch to level of digits, Bilateral.  Protective sensation intact 10/10 sites via 5.07/10g [place] Monofilament, Bilateral.  negative Tinel's, Bilateral.  negative Valleix sign, Bilateral.      Integument: Warm, dry, supple, Bilateral.  Open lesion absent, Bilateral.  Interdigital maceration absent to web spaces 1-4, Bilateral.  Nails are normal in length, thickness and color 1-5 bilateral.  Fissures absent, Bilateral.       Asessment: Patient is a 35 y.o. female with:      Diagnosis Orders   1. Tinea pedis of both feet     2. Dermatitis         Plan: Patient examined and evaluated. Current condition and treatment options discussed in detail. Apply clindamycin gel and alternate with hydrocortisone cream to feet daily.   Advised pt to keep clean and dry. Verbal and written instructions given to patient. Contact office with any questions/problems/concerns. No orders of the defined types were placed in this encounter. No orders of the defined types were placed in this encounter.        RTC in 1month(s).    7/24/2020      Electronically signed by Dianelys Cummins DPM on 7/24/2020 at 8:40 AM  7/24/2020

## 2020-07-27 ENCOUNTER — TELEPHONE (OUTPATIENT)
Dept: INTERNAL MEDICINE | Age: 30
End: 2020-07-27

## 2020-07-28 ENCOUNTER — HOSPITAL ENCOUNTER (EMERGENCY)
Age: 30
Discharge: HOME | End: 2020-07-28
Attending: EMERGENCY MEDICINE
Payer: MEDICAID

## 2020-07-28 VITALS
HEART RATE: 102 BPM | RESPIRATION RATE: 20 BRPM | BODY MASS INDEX: 48.82 KG/M2 | DIASTOLIC BLOOD PRESSURE: 91 MMHG | HEIGHT: 65 IN | WEIGHT: 293 LBS | OXYGEN SATURATION: 98 % | TEMPERATURE: 98.2 F | SYSTOLIC BLOOD PRESSURE: 142 MMHG

## 2020-07-28 LAB
-: ABNORMAL
AMORPHOUS: ABNORMAL
BACTERIA: ABNORMAL
BILIRUBIN URINE: NEGATIVE
CASTS UA: ABNORMAL /LPF
CHP ED QC CHECK: YES
COLOR: YELLOW
COMMENT UA: ABNORMAL
CRYSTALS, UA: ABNORMAL /HPF
DIRECT EXAM: NORMAL
EPITHELIAL CELLS UA: ABNORMAL /HPF (ref 0–5)
GLUCOSE URINE: NEGATIVE
KETONES, URINE: NEGATIVE
LEUKOCYTE ESTERASE, URINE: NEGATIVE
Lab: NORMAL
MUCUS: ABNORMAL
NITRITE, URINE: POSITIVE
OTHER OBSERVATIONS UA: ABNORMAL
PH UA: 5 (ref 5–8)
PREGNANCY TEST URINE, POC: NEGATIVE
PROTEIN UA: NEGATIVE
RBC UA: ABNORMAL /HPF (ref 0–2)
RENAL EPITHELIAL, UA: ABNORMAL /HPF
SPECIFIC GRAVITY UA: 1.02 (ref 1–1.03)
SPECIMEN DESCRIPTION: NORMAL
TRICHOMONAS: ABNORMAL
TURBIDITY: ABNORMAL
URINE HGB: NEGATIVE
UROBILINOGEN, URINE: NORMAL
WBC UA: ABNORMAL /HPF (ref 0–5)
YEAST: ABNORMAL

## 2020-07-28 PROCEDURE — 99283 EMERGENCY DEPT VISIT LOW MDM: CPT

## 2020-07-28 PROCEDURE — 87591 N.GONORRHOEAE DNA AMP PROB: CPT

## 2020-07-28 PROCEDURE — 6370000000 HC RX 637 (ALT 250 FOR IP): Performed by: NURSE PRACTITIONER

## 2020-07-28 PROCEDURE — 87660 TRICHOMONAS VAGIN DIR PROBE: CPT

## 2020-07-28 PROCEDURE — 6360000002 HC RX W HCPCS: Performed by: NURSE PRACTITIONER

## 2020-07-28 PROCEDURE — 87491 CHLMYD TRACH DNA AMP PROBE: CPT

## 2020-07-28 PROCEDURE — 96372 THER/PROPH/DIAG INJ SC/IM: CPT

## 2020-07-28 PROCEDURE — 87480 CANDIDA DNA DIR PROBE: CPT

## 2020-07-28 PROCEDURE — 81025 URINE PREGNANCY TEST: CPT

## 2020-07-28 PROCEDURE — 87510 GARDNER VAG DNA DIR PROBE: CPT

## 2020-07-28 PROCEDURE — 81001 URINALYSIS AUTO W/SCOPE: CPT

## 2020-07-28 RX ORDER — CEFTRIAXONE SODIUM 250 MG/1
250 INJECTION, POWDER, FOR SOLUTION INTRAMUSCULAR; INTRAVENOUS ONCE
Status: COMPLETED | OUTPATIENT
Start: 2020-07-28 | End: 2020-07-28

## 2020-07-28 RX ORDER — AZITHROMYCIN 250 MG/1
1000 TABLET, FILM COATED ORAL ONCE
Status: COMPLETED | OUTPATIENT
Start: 2020-07-28 | End: 2020-07-28

## 2020-07-28 RX ADMIN — AZITHROMYCIN MONOHYDRATE 1000 MG: 250 TABLET ORAL at 14:52

## 2020-07-28 RX ADMIN — CEFTRIAXONE SODIUM 250 MG: 250 INJECTION, POWDER, FOR SOLUTION INTRAMUSCULAR; INTRAVENOUS at 14:52

## 2020-07-28 ASSESSMENT — ENCOUNTER SYMPTOMS
SINUS PRESSURE: 0
ABDOMINAL PAIN: 0
BACK PAIN: 0
SHORTNESS OF BREATH: 0
COUGH: 0
VOMITING: 0
NAUSEA: 0
COLOR CHANGE: 0
DIARRHEA: 0

## 2020-07-28 NOTE — ED PROVIDER NOTES
which have NOT CHANGED    Details   clindamycin (CLEOCIN-T) 1 % gel Apply topically 2 times daily. , Disp-1 Tube,R-3, Normal      hydrocortisone (ALA-DAHIANA) 1 % cream Apply topically 2 times daily. , Disp-1 Tube,R-1, Normal      acetaminophen (TYLENOL) 500 MG tablet Take 1 tablet by mouth 4 times daily as needed for Pain, Disp-30 tablet,R-0Print      ibuprofen (ADVIL;MOTRIN) 600 MG tablet Take 1 tablet by mouth every 6 hours as needed for Pain, Disp-30 tablet,R-0Print      clotrimazole-betamethasone (LOTRISONE) 1-0.05 % cream Apply topically 2 times daily. , Disp-45 g, R-2, Normal      Misc.  Devices MISC Disp-2 Device, R-0, Print1 PAIR OF DIABETIC SHOES (1 LEFT/ 1 RIGHT) 1-3 PAIRS OF INSERTS (LEFT/ RIGHT)      metoprolol succinate (TOPROL XL) 25 MG extended release tablet Take 1 tablet by mouth daily, Disp-30 tablet, R-3Normal      hydrOXYzine (VISTARIL) 50 MG capsule R-0Historical Med      hydrochlorothiazide (HYDRODIURIL) 25 MG tablet Take 1 tablet by mouth daily, Disp-30 tablet, R-3Normal      !! albuterol sulfate HFA (PROVENTIL HFA) 108 (90 Base) MCG/ACT inhaler Inhale 1-2 puffs into the lungs every 4 hours as needed for Wheezing or Shortness of Breath (Space out to every 6 hours as symptoms improve) Space out to every 6 hours as symptoms improve., Disp-1 Inhaler, R-0Print      fluticasone (FLONASE) 50 MCG/ACT nasal spray 1 spray by Nasal route 2 times daily, Disp-1 Bottle, R-0Print      guaiFENesin (MUCINEX) 600 MG extended release tablet Take 2 tablets by mouth 2 times daily, Disp-30 tablet, R-0Print      !! albuterol sulfate HFA (VENTOLIN HFA) 108 (90 Base) MCG/ACT inhaler Inhale 2 puffs into the lungs every 6 hours as needed for Wheezing, Disp-1 Inhaler, R-1Print      diphenhydrAMINE (BENADRYL) 25 MG capsule Take 1 capsule by mouth every 4 hours as needed for Itching, Disp-15 capsule, R-0Print      FLUoxetine (PROZAC) 40 MG capsule Take 1 capsule by mouth daily, Disp-30 capsule, R-0      ziprasidone (GEODON) 40 MG capsule Take 1 capsule by mouth 2 times daily (with meals), Disp-60 capsule, R-0      traZODone (DESYREL) 50 MG tablet Take 1 tablet by mouth nightly as needed for Sleep, Disp-30 tablet, R-0       !! - Potential duplicate medications found. Please discuss with provider. ALLERGIES     is allergic to latex; fruit & vegetable daily [nutritional supplements]; and seasonal.  FAMILY HISTORY     She indicated that her mother is alive. She indicated that her father is alive. She indicated that her maternal grandfather is alive. She indicated that the status of her neg hx is unknown. SOCIAL HISTORY       Social History     Tobacco Use    Smoking status: Current Every Day Smoker     Packs/day: 0.50     Years: 3.00     Pack years: 1.50     Types: Cigarettes    Smokeless tobacco: Never Used    Tobacco comment: Accepting of Nicotine replacement: Patch    Substance Use Topics    Alcohol use: Yes     Comment: socially    Drug use: No     PHYSICAL EXAM     INITIAL VITALS: BP (!) 142/91   Pulse 102   Temp 98.2 °F (36.8 °C)   Resp 20   Ht 5' 5\" (1.651 m)   Wt (!) 323 lb 4.8 oz (146.6 kg)   SpO2 98%   BMI 53.80 kg/m²    Physical Exam  Constitutional:       Appearance: Normal appearance. HENT:      Right Ear: External ear normal.      Left Ear: External ear normal.      Mouth/Throat:      Mouth: Mucous membranes are moist.      Pharynx: Oropharynx is clear. Eyes:      General:         Right eye: No discharge. Conjunctiva/sclera: Conjunctivae normal.   Cardiovascular:      Rate and Rhythm: Regular rhythm. Tachycardia present. Pulmonary:      Effort: Pulmonary effort is normal.      Breath sounds: Normal breath sounds. Abdominal:      General: Bowel sounds are normal.      Palpations: Abdomen is soft. Tenderness: There is no abdominal tenderness. Genitourinary:     Comments: Mirena string visible from the cervical os. Small amount of thin white discharge.   No adnexal or cervical motion tenderness. Several slightly hypopigmented raised areas to be inside of the bilateral labia majora. Skin remains intact. No ulcers. Skin:     General: Skin is warm and dry. Neurological:      General: No focal deficit present. Mental Status: She is alert and oriented to person, place, and time. Psychiatric:         Mood and Affect: Mood normal.         Thought Content: Thought content normal.         DIAGNOSTIC RESULTS     RADIOLOGY:All plain film, CT, MRI, and formal ultrasound images (except ED bedside ultrasound) are read by the radiologist, see reports below, unless otherwise noted in MDM or here. Interpretation per the Radiologist below, if available at the time of this note:    No orders to display       LABS:  Labs Reviewed   URINALYSIS - Abnormal; Notable for the following components:       Result Value    Turbidity UA SLIGHTLY CLOUDY (*)     Nitrite, Urine POSITIVE (*)     All other components within normal limits   MICROSCOPIC URINALYSIS - Abnormal; Notable for the following components:    Bacteria, UA MANY (*)     All other components within normal limits   POCT URINE PREGNANCY - Normal   VAGINITIS DNA PROBE   C.TRACHOMATIS N.GONORRHOEAE DNA       All other labs were within normal range or not returned as of this dictation. EMERGENCY DEPARTMENT COURSE and DIFFERENTIAL DIAGNOSIS/MDM:   Vitals:    Vitals:    20 1308 20 1309 20 1550   BP: (!) 155/99  (!) 142/91   Pulse: 122  102   Resp: 14  20   Temp: 98.2 °F (36.8 °C)     SpO2: 98%  98%   Weight:  (!) 323 lb 4.8 oz (146.6 kg)    Height:  5' 5\" (1.651 m)        Medical Decision Makin-year-old female who is afebrile does not appear ill. Abdominal exam is benign. Urine is negative for pregnancy does not have any evidence of UTI. Pelvic labs are negative for BV, trichomonas and yeast.  She received Rocephin and azithromycin for the pending gonorrhea and Chlamydia cultures.   The labial bumps appear somewhat vascular in nature. There is no open skin or ulcers. No evidence of folliculitis. The bumps do not appear herpetic in nature. The patient was given the following meds in the ED:  Orders Placed This Encounter   Medications    cefTRIAXone (ROCEPHIN) injection 250 mg    azithromycin (ZITHROMAX) tablet 1,000 mg       FINAL IMPRESSION      1. Vaginal discharge    2.  Labial lesion          DISPOSITION/PLAN   DISPOSITION Decision To Discharge 07/28/2020 03:37:18 PM      PATIENT REFERRED TO:     Follow up with your OB-GYN          DISCHARGE MEDICATIONS:     Discharge Medication List as of 7/28/2020  3:38 PM          CRITICAL CARE TIME       Please note that portions of this note were completed with a voice recognition program.      EKATERINA OHFFMANN, NORA - CNP            NORA Lima - SAMUEL  07/28/20 2576

## 2020-07-28 NOTE — ED PROVIDER NOTES
The patient was seen and examined by me in conjunction with the mid-level provider. I agree with his/her assessment and treatment plan. The patient was treated with Rocephin and Zithromax here.      Srinivasan Burgess MD  07/28/20 7302

## 2020-07-28 NOTE — ED NOTES
Pt ambulatory to room 9 with c/o clear/white colored vaginal discharge with foul odor x 1 week. Pt states \"I also noticed like these bumps on the inside of my vagina lips too\". Pt denies any abd pain, back pain, N/V/D, urinary symptoms, or fevers. Abd soft, rounded, BS active x 4 quads. Respirations even and non labored. Skin PWD, MMM. NAD noted.       Gisell Weinstein RN  07/28/20 3221

## 2020-07-29 LAB
C TRACH DNA GENITAL QL NAA+PROBE: NEGATIVE
HCG, PREGNANCY URINE (POC): NEGATIVE
N. GONORRHOEAE DNA: NEGATIVE
SPECIMEN DESCRIPTION: NORMAL

## 2020-07-31 ENCOUNTER — TELEPHONE (OUTPATIENT)
Dept: PODIATRY | Age: 30
End: 2020-07-31

## 2020-08-11 ENCOUNTER — HOSPITAL ENCOUNTER (OUTPATIENT)
Age: 30
Setting detail: SPECIMEN
Discharge: HOME | End: 2020-08-11
Payer: MEDICARE

## 2020-08-11 ENCOUNTER — OFFICE VISIT (OUTPATIENT)
Dept: INTERNAL MEDICINE | Age: 30
End: 2020-08-11
Payer: MEDICARE

## 2020-08-11 VITALS
HEART RATE: 76 BPM | DIASTOLIC BLOOD PRESSURE: 76 MMHG | BODY MASS INDEX: 45.99 KG/M2 | SYSTOLIC BLOOD PRESSURE: 139 MMHG | WEIGHT: 293 LBS | HEIGHT: 67 IN

## 2020-08-11 LAB
ABSOLUTE EOS #: 0.14 K/UL (ref 0–0.44)
ABSOLUTE IMMATURE GRANULOCYTE: 0.05 K/UL (ref 0–0.3)
ABSOLUTE LYMPH #: 3.36 K/UL (ref 1.1–3.7)
ABSOLUTE MONO #: 0.6 K/UL (ref 0.1–1.2)
ANION GAP SERPL CALCULATED.3IONS-SCNC: 11 MMOL/L (ref 9–17)
BASOPHILS # BLD: 1 % (ref 0–2)
BASOPHILS ABSOLUTE: 0.07 K/UL (ref 0–0.2)
BUN BLDV-MCNC: 14 MG/DL (ref 6–20)
BUN/CREAT BLD: ABNORMAL (ref 9–20)
CALCIUM SERPL-MCNC: 8.9 MG/DL (ref 8.6–10.4)
CHLORIDE BLD-SCNC: 107 MMOL/L (ref 98–107)
CO2: 23 MMOL/L (ref 20–31)
CREAT SERPL-MCNC: 1 MG/DL (ref 0.5–0.9)
CREATININE URINE: 385.1 MG/DL (ref 28–217)
DIFFERENTIAL TYPE: ABNORMAL
EOSINOPHILS RELATIVE PERCENT: 1 % (ref 1–4)
GFR AFRICAN AMERICAN: >60 ML/MIN
GFR NON-AFRICAN AMERICAN: >60 ML/MIN
GFR SERPL CREATININE-BSD FRML MDRD: ABNORMAL ML/MIN/{1.73_M2}
GFR SERPL CREATININE-BSD FRML MDRD: ABNORMAL ML/MIN/{1.73_M2}
GLUCOSE BLD-MCNC: 98 MG/DL (ref 70–99)
HBA1C MFR BLD: 5.4 %
HCT VFR BLD CALC: 41.1 % (ref 36.3–47.1)
HEMOGLOBIN: 13.2 G/DL (ref 11.9–15.1)
IMMATURE GRANULOCYTES: 1 %
LYMPHOCYTES # BLD: 34 % (ref 24–43)
MCH RBC QN AUTO: 27.9 PG (ref 25.2–33.5)
MCHC RBC AUTO-ENTMCNC: 32.1 G/DL (ref 28.4–34.8)
MCV RBC AUTO: 86.9 FL (ref 82.6–102.9)
MONOCYTES # BLD: 6 % (ref 3–12)
NRBC AUTOMATED: 0 PER 100 WBC
PDW BLD-RTO: 13.3 % (ref 11.8–14.4)
PLATELET # BLD: 377 K/UL (ref 138–453)
PLATELET ESTIMATE: ABNORMAL
PMV BLD AUTO: 10 FL (ref 8.1–13.5)
POTASSIUM SERPL-SCNC: 4.1 MMOL/L (ref 3.7–5.3)
RBC # BLD: 4.73 M/UL (ref 3.95–5.11)
RBC # BLD: ABNORMAL 10*6/UL
SEG NEUTROPHILS: 57 % (ref 36–65)
SEGMENTED NEUTROPHILS ABSOLUTE COUNT: 5.56 K/UL (ref 1.5–8.1)
SODIUM BLD-SCNC: 141 MMOL/L (ref 135–144)
TOTAL PROTEIN, URINE: 20 MG/DL
URINE TOTAL PROTEIN CREATININE RATIO: 0.05 (ref 0–0.2)
WBC # BLD: 9.8 K/UL (ref 3.5–11.3)
WBC # BLD: ABNORMAL 10*3/UL

## 2020-08-11 PROCEDURE — 99213 OFFICE O/P EST LOW 20 MIN: CPT | Performed by: STUDENT IN AN ORGANIZED HEALTH CARE EDUCATION/TRAINING PROGRAM

## 2020-08-11 PROCEDURE — 4004F PT TOBACCO SCREEN RCVD TLK: CPT | Performed by: STUDENT IN AN ORGANIZED HEALTH CARE EDUCATION/TRAINING PROGRAM

## 2020-08-11 PROCEDURE — G8427 DOCREV CUR MEDS BY ELIG CLIN: HCPCS | Performed by: STUDENT IN AN ORGANIZED HEALTH CARE EDUCATION/TRAINING PROGRAM

## 2020-08-11 PROCEDURE — 83036 HEMOGLOBIN GLYCOSYLATED A1C: CPT | Performed by: STUDENT IN AN ORGANIZED HEALTH CARE EDUCATION/TRAINING PROGRAM

## 2020-08-11 PROCEDURE — 99211 OFF/OP EST MAY X REQ PHY/QHP: CPT | Performed by: INTERNAL MEDICINE

## 2020-08-11 PROCEDURE — G8417 CALC BMI ABV UP PARAM F/U: HCPCS | Performed by: STUDENT IN AN ORGANIZED HEALTH CARE EDUCATION/TRAINING PROGRAM

## 2020-08-11 RX ORDER — ALBUTEROL SULFATE 90 UG/1
2 AEROSOL, METERED RESPIRATORY (INHALATION) EVERY 6 HOURS PRN
Qty: 1 INHALER | Refills: 1 | Status: SHIPPED | OUTPATIENT
Start: 2020-08-11 | End: 2022-05-24 | Stop reason: SDUPTHER

## 2020-08-11 RX ORDER — NICOTINE 21 MG/24HR
1 PATCH, TRANSDERMAL 24 HOURS TRANSDERMAL DAILY
Qty: 42 PATCH | Refills: 2 | Status: SHIPPED | OUTPATIENT
Start: 2020-08-11 | End: 2020-11-17

## 2020-08-11 RX ORDER — FLUTICASONE PROPIONATE 50 MCG
1 SPRAY, SUSPENSION (ML) NASAL 2 TIMES DAILY
Qty: 1 BOTTLE | Refills: 0 | Status: SHIPPED | OUTPATIENT
Start: 2020-08-11 | End: 2020-09-08

## 2020-08-11 RX ORDER — BLOOD PRESSURE TEST KIT
KIT MISCELLANEOUS
Qty: 1 KIT | Refills: 0 | Status: SHIPPED | OUTPATIENT
Start: 2020-08-11 | End: 2020-08-18 | Stop reason: ALTCHOICE

## 2020-08-11 RX ORDER — AMLODIPINE BESYLATE 5 MG/1
5 TABLET ORAL DAILY
Qty: 30 TABLET | Refills: 2 | Status: SHIPPED | OUTPATIENT
Start: 2020-08-11 | End: 2022-05-24 | Stop reason: SDUPTHER

## 2020-08-11 RX ORDER — DIPHENHYDRAMINE HCL 25 MG
25 CAPSULE ORAL EVERY 4 HOURS PRN
Qty: 15 CAPSULE | Refills: 0 | Status: ON HOLD | OUTPATIENT
Start: 2020-08-11 | End: 2022-05-14

## 2020-08-11 NOTE — PROGRESS NOTES
@[facility]@    [facility]    New Patient Note/History and Physical    Date of patient's visit: 8/11/2020    Name:  Wayne Sanhcez      YOB: 1990    Patient Care Team:  Dianelys Cummins DPM as Physician (Podiatry)    REASON FOR VISIT: transfer of care, follow up    Chief Complaint   Patient presents with   [facility] Established New Doctor    Diabetes    Health Maintenance     vaccines refused      HISTORY OF PRESENTING ILLNESS:    History was obtained from the patient. Wayne Sanchez is a 35 y.o. is here to establish care. Hypertension  - have not been taking medication   - blood pressure today was 139/76  - she has changed her diet, eating less carbohydrates and more protein  - she continues to smoke, about 1 PPD for 10 years, however she is motivated to quit     Pre-diabetic  - A1c today was 5.4  - was on Metformin 500 BID at some point in time (over 1 year ago), however is not taking at this time    Flat feet  - Saw Podiatry on 7/24/2020. Was ordered orthopedic shoes, however needed to follow up with us before she could get them  - she has difficulty with ambulating because of fallen arches, resulting in recurring blisters    Bipolar disorder and anxiety  - [facility]  - has an appointment this week  - they refill her antipsychotics        PAST MEDICAL AND SURGICAL HISTORY:          Diagnosis Date    Asthma     Has not needed inhaler since 2009    B[facility] disorder, mixed ([facility] [address]     Cannabis abuse 12/31/2016    Chronic hypertension 11/25/2019    Depression     Diabetes mellitus ([facility] [address] 11/20/2013    borderline    Obesity 8/1/2013    Polycystic ovarian disease            Procedure Laterality Date    TONSILLECTOMY AND ADENOIDECTOMY      WISDOM TOOTH EXTRACTION         SOCIAL HISTORY:    TOBACCO:   reports that she has been smoking cigarettes. She has a 10.00 pack-year smoking history.  She has never used smokeless tobacco.  ETOH:   reports current alcohol use. DRUGS:  reports no history of drug use. OCCUPATION:      ALLERGIES:      Allergies   Allergen Reactions    Latex      Rash     Fruit & Vegetable Daily [Nutritional Supplements]      Cataloupe,honeydew,grapes,watermelon,bananas,apples,pineapples    Seasonal          HOME MEDICATION:      Current Outpatient Medications on File Prior to Visit   Medication Sig Dispense Refill    clotrimazole-betamethasone (LOTRISONE) 1-0.05 % cream Apply topically 2 times daily. (Patient not taking: Reported on 8/11/2020) 45 g 2    Misc.  Devices MISC 1 PAIR OF DIABETIC SHOES (1 LEFT/ 1 RIGHT)  1-3 PAIRS OF INSERTS (LEFT/ RIGHT) (Patient not taking: Reported on 8/11/2020) 2 Device 0    metoprolol succinate (TOPROL XL) 25 MG extended release tablet Take 1 tablet by mouth daily (Patient not taking: Reported on 8/11/2020) 30 tablet 3    hydrochlorothiazide (HYDRODIURIL) 25 MG tablet Take 1 tablet by mouth daily (Patient not taking: Reported on 8/11/2020) 30 tablet 3    fluticasone (FLONASE) 50 MCG/ACT nasal spray 1 spray by Nasal route 2 times daily (Patient not taking: Reported on 8/11/2020) 1 Bottle 0    albuterol sulfate HFA (VENTOLIN HFA) 108 (90 Base) MCG/ACT inhaler Inhale 2 puffs into the lungs every 6 hours as needed for Wheezing (Patient not taking: Reported on 8/11/2020) 1 Inhaler 1    diphenhydrAMINE (BENADRYL) 25 MG capsule Take 1 capsule by mouth every 4 hours as needed for Itching (Patient not taking: Reported on 8/11/2020) 15 capsule 0    FLUoxetine (PROZAC) 40 MG capsule Take 1 capsule by mouth daily (Patient not taking: Reported on 8/11/2020) 30 capsule 0    ziprasidone (GEODON) 40 MG capsule Take 1 capsule by mouth 2 times daily (with meals) (Patient not taking: Reported on 8/11/2020) 60 capsule 0    traZODone (DESYREL) 50 MG tablet Take 1 tablet by mouth nightly as needed for Sleep (Patient not taking: Reported on 8/11/2020) 30 tablet 0     Current Facility-Administered Medications on File Prior to Visit   Medication Dose Route Frequency Provider Last Rate Last Dose    levonorgestrel (MIRENA) IUD 52 mg 1 each  1 each Intrauterine Once Lani Fuel   1 each at 01/07/19 1644       FAMILY HISTORY:          Problem Relation Age of Onset    High Blood Pressure Mother     Diabetes Maternal Grandfather     High Blood Pressure Maternal Grandfather     Schizophrenia Maternal Aunt     Bipolar Disorder Maternal Aunt     Schizophrenia Maternal Aunt     Colon Cancer Neg Hx     Breast Cancer Neg Hx     Uterine Cancer Neg Hx     Ovarian Cancer Neg Hx        REVIEW OF SYSTEMS:    · Constitutional: Negative for Fever, chills  · Eyes: Negative for visual changes, diplopia  · ENT: Negative for mouth sores, sore throat. · Cardiovascular: Negative for lightheadedness ,chest pain, palpitations   · Respiratory:Negative for Shortness of breath,cough or wheezing. · Gastrointestinal: Negative for nausea/vomiting, change in bowel habits, abdominal pain  · Genitourinary:Negative for change in bladder habits, dysuria, hematuria. · Musculoskeletal: Negative for joint pain   · Neurological: Negative for headache, change in muscle strength numbness/tingling      PHYSICAL EXAM:      Vitals:    08/11/20 0947   BP: 139/76   Site: Right Upper Arm   Position: Sitting   Cuff Size: Medium Adult   Pulse: 76   Weight: (!) 325 lb (147.4 kg)   Height: 5' 7\" (1.702 m)     Wt Readings from Last 3 Encounters:   08/11/20 (!) 325 lb (147.4 kg)   07/28/20 (!) 323 lb 4.8 oz (146.6 kg)   07/24/20 230 lb (104.3 kg)     Body mass index is 50.9 kg/m². · General appearance: awake, alert, cooperative. Obese   · HEENT: Head: Normocephalic, no lesions, without obvious abnormality.   · Lungs: clear to auscultation bilaterally  · Heart: regular rate and rhythm, S1, S2 normal, no murmur  · Abdomen: soft, non-tender; bowel sounds normal; no masses,  no organomegaly  · Extremities: extremities normal, atraumatic, no cyanosis or edema  · Neurological:  Awake, alert, oriented to name, place and time. Cranial nerves II-XII are grossly intact. Reflexes normal and symmetric. Sensation grossly normal  · Eye no icterus no redness  · Skin No lesions  LABORATORY FINDINGS:    CBC:   Lab Results   Component Value Date    WBC 9.1 01/10/2019    HGB 14.5 01/10/2019     01/10/2019     06/01/2012     BMP:    Lab Results   Component Value Date     01/10/2019    K 4.3 01/10/2019     01/10/2019    CO2 24 01/10/2019    BUN 12 01/10/2019    CREATININE 0.97 01/10/2019    GLUCOSE 118 01/10/2019    GLUCOSE 157 06/01/2012     Hemoglobin A1C:   Lab Results   Component Value Date    LABA1C 6.3 01/10/2019     Lipid profile:   Lab Results   Component Value Date    CHOL 231 01/10/2019    TRIG 124 01/10/2019    HDL 48 01/10/2019    HDL 48 01/10/2019     Lab Results   Component Value Date    LDLCHOLESTEROL 158 (H) 01/10/2019    LDLCHOLESTEROL 158 (H) 01/10/2019       Thyroid functions:   Lab Results   Component Value Date    TSH 1.06 01/10/2019      Hepatic functions:   Lab Results   Component Value Date    ALT 26 01/10/2019    AST 17 01/10/2019    PROT 7.7 01/10/2019    BILITOT 0.24 01/10/2019    LABALBU 4.1 01/10/2019     ASSESSMENT AND PLAN:   1. Prediabetes. controlled    - POCT glycosylated hemoglobin (Hb A1C)  - Basic Metabolic Panel; Future  - CBC With Auto Differential; Future  - Microalbumin, Ur; Future  - Protein / Creatinine Ratio, Urine; Future    2. Flat feet, bilateral    - Foot Care Products [facility]; Place in shoes as directed  Dispense: 2 each; Refill: 0    3. Bipolar disorder, mixed ([address]  - antipsychotics refilled at [facility]  - on Trazodone, Geodon     4. Morbid obesity with BMI of 50.0-59.9, adult ([address]  - educated on lifestyle modifications    5.  Uncomplicated asthma, unspecified asthma severity, unspecified whether persistent    - albuterol sulfate HFA (VENTOLIN HFA) 108 (90 Base) MCG/ACT inhaler;

## 2020-08-11 NOTE — PATIENT INSTRUCTIONS
Labs given to patient, they will have them done before their next visit. Medications e-scribed to pharmacy of patient's choice. PRINTED SCRIPT FOR BP CUFF       PRINTED SCRIPT FOR ARCH SUPPORTS       ANNUAL WELLNESS VISIT SCHEDULED FOR       Follow-up appointment scheduled for  09/08/2020, AVS given to patient.       TV

## 2020-08-18 ENCOUNTER — VIRTUAL VISIT (OUTPATIENT)
Dept: INTERNAL MEDICINE | Age: 30
End: 2020-08-18
Payer: MEDICARE

## 2020-08-18 PROCEDURE — 99406 BEHAV CHNG SMOKING 3-10 MIN: CPT | Performed by: NURSE PRACTITIONER

## 2020-08-18 PROCEDURE — G0438 PPPS, INITIAL VISIT: HCPCS | Performed by: NURSE PRACTITIONER

## 2020-08-18 ASSESSMENT — PATIENT HEALTH QUESTIONNAIRE - PHQ9
1. LITTLE INTEREST OR PLEASURE IN DOING THINGS: 0
2. FEELING DOWN, DEPRESSED OR HOPELESS: 0
SUM OF ALL RESPONSES TO PHQ QUESTIONS 1-9: 0
SUM OF ALL RESPONSES TO PHQ QUESTIONS 1-9: 0
SUM OF ALL RESPONSES TO PHQ9 QUESTIONS 1 & 2: 0

## 2020-08-18 ASSESSMENT — LIFESTYLE VARIABLES
HOW OFTEN DO YOU HAVE A DRINK CONTAINING ALCOHOL: 2
HOW OFTEN DURING THE LAST YEAR HAVE YOU BEEN UNABLE TO REMEMBER WHAT HAPPENED THE NIGHT BEFORE BECAUSE YOU HAD BEEN DRINKING: 0
HOW OFTEN DURING THE LAST YEAR HAVE YOU FAILED TO DO WHAT WAS NORMALLY EXPECTED FROM YOU BECAUSE OF DRINKING: 0
HOW OFTEN DO YOU HAVE SIX OR MORE DRINKS ON ONE OCCASION: 1
HOW OFTEN DURING THE LAST YEAR HAVE YOU NEEDED AN ALCOHOLIC DRINK FIRST THING IN THE MORNING TO GET YOURSELF GOING AFTER A NIGHT OF HEAVY DRINKING: 0
HOW OFTEN DURING THE LAST YEAR HAVE YOU HAD A FEELING OF GUILT OR REMORSE AFTER DRINKING: 0
AUDIT TOTAL SCORE: 5
HOW OFTEN DURING THE LAST YEAR HAVE YOU FOUND THAT YOU WERE NOT ABLE TO STOP DRINKING ONCE YOU HAD STARTED: 0
AUDIT-C TOTAL SCORE: 5
HOW MANY STANDARD DRINKS CONTAINING ALCOHOL DO YOU HAVE ON A TYPICAL DAY: 2
HAVE YOU OR SOMEONE ELSE BEEN INJURED AS A RESULT OF YOUR DRINKING: 0
HAS A RELATIVE, FRIEND, DOCTOR, OR ANOTHER HEALTH PROFESSIONAL EXPRESSED CONCERN ABOUT YOUR DRINKING OR SUGGESTED YOU CUT DOWN: 0

## 2020-08-18 NOTE — PROGRESS NOTES
Medicare Annual Wellness Visit  Are Name: Cyril Matute Date: 2020   MRN: [id number] Sex: Female   Age: 35 y.o. Ethnicity: Non-/Non    : 1990 Race: Black      Jacob Conley is here for Medicare AWV and Health Maintenance (declined vaccines )    Screenings for behavioral, psychosocial and functional/safety risks, and cognitive dysfunction are all negative except as indicated below. These results, as well as other patient data from the [address] form, are documented in Flowsheets linked to this Encounter. Allergies   Allergen Reactions    Latex      Rash     Fruit & Vegetable Daily [Nutritional Supplements]      Cataloupe,honeydew,grapes,watermelon,bananas,apples,pineapples    Seasonal          Prior to Visit Medications    Medication Sig Taking?  Authorizing Provider   albuterol sulfate HFA (VENTOLIN HFA) 108 (90 Base) MCG/ACT inhaler Inhale 2 puffs into the lungs every 6 hours as needed for Wheezing Yes Venancio Combs MD   diphenhydrAMINE (BENADRYL) 25 MG capsule Take 1 capsule by mouth every 4 hours as needed for Itching Yes Venancio Combs MD   fluticasone (FLONASE) 50 MCG/ACT nasal spray 1 spray by Nasal route 2 times daily Yes Venancio Combs MD   nicotine (NICODERM CQ) 21 MG/24HR Place 1 patch onto the skin daily Yes Venancio Combs MD   amLODIPine (NORVASC) 5 MG tablet Take 1 tablet by mouth daily Yes Venancio Combs MD   FLUoxetine (PROZAC) 40 MG capsule Take 1 capsule by mouth daily Yes Geoffrey Orlando MD   ziprasidone (GEODON) 40 MG capsule Take 1 capsule by mouth 2 times daily (with meals) Yes Geoffrey Orlando MD   traZODone (DESYREL) 50 MG tablet Take 1 tablet by mouth nightly as needed for Sleep Yes Geoffrey Orlando MD         Past Medical History:   Diagnosis Date    Asthma     Has not needed inhaler since     B[facility] disorder, mixed ([facility] [address]     Cannabis abuse 2016    Chronic hypertension 2019    Depression     Diabetes mellitus ([facility] 2013 borderline    Obesity 8/1/2013    Polycystic ovarian disease        Past Surgical History:   Procedure Laterality Date    TONSILLECTOMY AND ADENOIDECTOMY      WISDOM TOOTH EXTRACTION           Family History   Problem Relation Age of Onset    High Blood Pressure Mother     Diabetes Maternal Grandfather     High Blood Pressure Maternal Grandfather     Schizophrenia Maternal Aunt     Bipolar Disorder Maternal Aunt     Schizophrenia Maternal Aunt     Colon Cancer Neg Hx     Breast Cancer Neg Hx     Uterine Cancer Neg Hx     Ovarian Cancer Neg Hx        CareTeam (Including outside providers/suppliers regularly involved in providing care):   Patient Care Team:  Pastor Paz MD as PCP - General (Internal Medicine)  Lauren Montgomery DPM as Physician (Podiatry)    Wt Readings from Last 3 Encounters:   08/18/20 (!) 325 lb (147.4 kg)   08/11/20 (!) 325 lb (147.4 kg)   07/28/20 (!) 323 lb 4.8 oz (146.6 kg)      Patient-Reported Vitals 8/18/2020   Patient-Reported Weight 325 lb   Patient-Reported Height 5'7\"      There is no height or weight on file to calculate BMI. Wt Readings from Last 3 Encounters:   08/18/20 (!) 325 lb (147.4 kg)   08/11/20 (!) 325 lb (147.4 kg)   07/28/20 (!) 323 lb 4.8 oz (146.6 kg)     Temp Readings from Last 3 Encounters:   07/28/20 98.2 °F (36.8 °C)   07/24/20 97.3 °F (36.3 °C)   07/15/20 98.5 °F (36.9 °C) (Oral)     BP Readings from Last 3 Encounters:   08/11/20 139/76   07/28/20 (!) 142/91   07/15/20 (!) 152/90     Pulse Readings from Last 3 Encounters:   08/11/20 76   07/28/20 102   07/15/20 91     Based upon direct observation of the patient, evaluation of cognition reveals recent and remote memory intact. Patient's complete Health Risk Assessment and screening values have been reviewed and are found in Flowsheets. The following problems were reviewed today and where indicated follow up appointments were made and/or referrals ordered.     Positive Risk Factor Screenings with Interventions:     Cognitive:   Words recalled: 2 Words Recalled  Clock Drawing Test (CDT) Score: (rohan)  Total Score Interpretation: Positive Mini-Cog  Cognitive Impairment Interventions:  · Patient declines any further evaluation/treatment for cognitive impairment    Substance Abuse:  Social History     Tobacco History     Smoking Status  Current Every Day Smoker Smoking Frequency  1 pack/day for 10 years (10 pk yrs) Smoking Tobacco Type  Cigarettes    Smokeless Tobacco Use  Never Used    Tobacco Comment  Accepting of Nicotine replacement: Patch           Alcohol History     Alcohol Use Status  Yes Comment  socially          Drug Use     Drug Use Status  No          Sexual Activity     Sexually Active  Not Currently Partners  Female, Male               Audit Questionnaire: Screen for Alcohol Misuse  How often do you have a drink containing alcohol?: Two to four times a month  How many standard drinks containing alcohol do you have on a typical day when drinking?: Five or six  How often do you have six or more drinks on one occasion?: Less than monthly  Audit-C Score: 5  During the past year, how often have you found that you were not able to stop drinking once you had started?: Never  During the past year, how often have you failed to do what was normally expected of you because of drinking?: Never  During the past year, how often have you needed a drink in the morning to get yourself going after a heavy drinking session?: Never  During the past year, how often have you had a feeling of guilt or remorse after drinking?: Never  During the past year, have you been unable to remember what happened the night before because you had been drinking?: Never  Have you or someone else been injured as a result of your drinking?: No  Has a relative or friend, doctor or health worker been concerned about your drinking or suggested you cut down?: No  Total Score: 5  Substance Abuse Interventions:  · Tobacco abuse:  tobacco cessation tips and resources provided, patient is not ready to work toward tobacco cessation at this time  · Alcohol misuse/dependence:  patient is not ready to change his/her alcohol consumption behavior at this time  · Recreational drug use:  patient is not ready to change his/her recreational drug use behavior at this time    General Health:  General  In the past 7 days, have you experienced any of the following? New or Increased Pain, New or Increased Fatigue, Loneliness, Social Isolation, Stress or Anger?: None of These  Do you get the social and emotional support that you need?: Yes  Do you have a Living Will?: (!) No  General Health Risk Interventions:  · No Living Will: additional information provided with AVS after discussion with pt and referred to ACP specialist    Health Habits/Nutrition:  Health Habits/Nutrition  Do you exercise for at least 20 minutes 2-3 times per week?: (!) No  Have you lost any weight without trying in the past 3 months?: No  Do you eat fewer than 2 meals per day?: No  Have you seen a dentist within the past year?: Yes  There is no height or weight on file to calculate BMI.   Health Habits/Nutrition Interventions:  · Inadequate physical activity:  patient agrees to exercise for at least 150 minutes/week, educational materials provided to promote increased physical activity    Personalized Preventive Plan   Current Health Maintenance Status  Immunization History   Administered Date(s) Administered    Influenza Virus Vaccine 01/10/2019    Tdap (Boostrix, Adacel) 12/03/2016        Health Maintenance   Topic Date Due    Annual Wellness Visit (AWV)  05/29/2019    Varicella vaccine (1 of 2 - 2-dose childhood series) 09/08/2020 (Originally 1/26/1991)    Pneumococcal 0-64 years Vaccine (1 of 1 - PPSV23) 02/18/2021 (Originally 1/26/1997)    Hepatitis B vaccine (1 of 3 - Risk 3-dose series) 08/18/2021 (Originally 1/26/2010)    Flu vaccine (1) 09/01/2020    A1C test (Diabetic or Prediabetic)  08/11/2021    Cervical cancer screen  12/11/2021    DTaP/Tdap/Td vaccine (2 - Td) 12/03/2026    HIV screen  Completed    Hepatitis A vaccine  Aged Out    Hib vaccine  Aged Out    Meningococcal (ACWY) vaccine  Aged Out     Recommendations for [employer] Due: see orders and patient instructions/AVS.  . Recommended screening schedule for the next 5-10 years is provided to the patient in written form: see Patient Instructions/AVS.    Tigre Rocha was seen today for medicare awv and health maintenance. Diagnoses and all orders for this visit:    ACP (advance care planning)  -     [address] [facility], 1ST 30MIN [address] [place] [address] Referral to ACP Clinical Specialist    BMI 50.0-59.9, adult ([address]  -     [place] Behavior  obesity 15m []    Personal history of tobacco use, presenting hazards to health  -     [place] TOBACCO USE CESSATION INTERMEDIATE 3-10 MINUTES [11169]    Routine general medical examination at a health care facility  -     [address] [place] [address]              Fawn Castanon is a 35 y.o. female being evaluated by a Virtual Visit (phone) encounter to address concerns as mentioned above. A caregiver was present when appropriate. Due to this being a TeleHealth encounter (During SXZSY-63 public health emergency), evaluation of the following organ systems was limited: Vitals/Constitutional/EENT/Resp/CV/GI//MS/Neuro/Skin/Heme-Lymph-Imm. Pursuant to the emergency declaration under the [address] [place] [address] authority and the [employer] and [employer] Act, this Virtual Visit was conducted with patient's (and/or legal guardian's) consent, to reduce the patient's risk of exposure to COVID-19 and provide necessary medical care.   The patient (and/or legal guardian) has also been advised to contact this office for worsening conditions or problems, and seek emergency medical treatment and/or call 911 if deemed necessary. Patient identification was verified at the start of the visit: Yes    Services were provided through phone to substitute for in-person clinic visit. Patient and provider were located at their individual homes. --NORA Hardin CNP on 8/18/2020 at 11:13 AM    An electronic signature was used to authenticate this note. Advance Care Planning   Advanced Care Planning: Discussed the patients choices for care and treatment in case of a health event that adversely affects decision-making abilities. Also discussed the patients long-term treatment options. Reviewed with the patient the [address] [place] [address] Declaration forms  Reviewed the process of designating a competent adult as an Agent (or -in-fact) that could take make health care decisions for the patient if incompetent. Patient was asked to complete the declaration forms, either acknowledge the forms by a public notary or an eligible witness and provide a signed copy to the practice office. Time spent (minutes): 3     Obesity Counseling: Assessed behavioral health risks and factors affecting choice of behavior. Suggested weight control approaches, including dietary changes behavioral modification and follow up plan. Provided educational and support documentation. Time spent (minutes): 3    Tobacco Cessation Counseling: Patient advised about behavior change, including information about personal health harms, usage of appropriate cessation measures and benefits of cessation.   Time spent (minutes): 3

## 2020-09-04 ENCOUNTER — HOSPITAL ENCOUNTER (EMERGENCY)
Age: 30
Discharge: HOME | End: 2020-09-04
Attending: EMERGENCY MEDICINE
Payer: MEDICARE

## 2020-09-04 VITALS
BODY MASS INDEX: 45.99 KG/M2 | HEIGHT: 67 IN | HEART RATE: 78 BPM | RESPIRATION RATE: 16 BRPM | DIASTOLIC BLOOD PRESSURE: 84 MMHG | WEIGHT: 293 LBS | OXYGEN SATURATION: 100 % | SYSTOLIC BLOOD PRESSURE: 140 MMHG | TEMPERATURE: 97.3 F

## 2020-09-04 LAB
BILIRUBIN URINE: NEGATIVE
CHP ED QC CHECK: NORMAL
COLOR: YELLOW
COMMENT UA: ABNORMAL
DIRECT EXAM: NORMAL
GLUCOSE URINE: NEGATIVE
KETONES, URINE: NEGATIVE
LEUKOCYTE ESTERASE, URINE: NEGATIVE
Lab: NORMAL
NITRITE, URINE: NEGATIVE
PH UA: 5.5 (ref 5–8)
PREGNANCY TEST URINE, POC: NEGATIVE
PROTEIN UA: NEGATIVE
SPECIFIC GRAVITY UA: 1.03 (ref 1–1.03)
SPECIMEN DESCRIPTION: NORMAL
TURBIDITY: CLEAR
URINE HGB: NEGATIVE
UROBILINOGEN, URINE: NORMAL

## 2020-09-04 PROCEDURE — 87510 GARDNER VAG DNA DIR PROBE: CPT

## 2020-09-04 PROCEDURE — 81003 URINALYSIS AUTO W/O SCOPE: CPT

## 2020-09-04 PROCEDURE — 6370000000 HC RX 637 (ALT 250 FOR IP): Performed by: GENERAL PRACTICE

## 2020-09-04 PROCEDURE — 99283 EMERGENCY DEPT VISIT LOW MDM: CPT

## 2020-09-04 PROCEDURE — 87491 CHLMYD TRACH DNA AMP PROBE: CPT

## 2020-09-04 PROCEDURE — 96372 THER/PROPH/DIAG INJ SC/IM: CPT

## 2020-09-04 PROCEDURE — 6360000002 HC RX W HCPCS: Performed by: GENERAL PRACTICE

## 2020-09-04 PROCEDURE — 87480 CANDIDA DNA DIR PROBE: CPT

## 2020-09-04 PROCEDURE — 87660 TRICHOMONAS VAGIN DIR PROBE: CPT

## 2020-09-04 PROCEDURE — 87591 N.GONORRHOEAE DNA AMP PROB: CPT

## 2020-09-04 RX ORDER — CEFTRIAXONE SODIUM 250 MG/1
250 INJECTION, POWDER, FOR SOLUTION INTRAMUSCULAR; INTRAVENOUS ONCE
Status: COMPLETED | OUTPATIENT
Start: 2020-09-04 | End: 2020-09-04

## 2020-09-04 RX ORDER — AZITHROMYCIN 250 MG/1
1000 TABLET, FILM COATED ORAL ONCE
Status: COMPLETED | OUTPATIENT
Start: 2020-09-04 | End: 2020-09-04

## 2020-09-04 RX ORDER — PENICILLIN V POTASSIUM 500 MG/1
500 TABLET ORAL 2 TIMES DAILY
Qty: 14 TABLET | Refills: 0 | Status: SHIPPED | OUTPATIENT
Start: 2020-09-04 | End: 2020-09-11

## 2020-09-04 RX ADMIN — AZITHROMYCIN 1000 MG: 250 TABLET, FILM COATED ORAL at 14:58

## 2020-09-04 RX ADMIN — CEFTRIAXONE SODIUM 250 MG: 250 INJECTION, POWDER, FOR SOLUTION INTRAMUSCULAR; INTRAVENOUS at 14:58

## 2020-09-04 ASSESSMENT — ENCOUNTER SYMPTOMS
VOMITING: 0
ABDOMINAL PAIN: 0
NAUSEA: 0
SHORTNESS OF BREATH: 0

## 2020-09-04 ASSESSMENT — PAIN DESCRIPTION - PAIN TYPE: TYPE: ACUTE PAIN;CHRONIC PAIN

## 2020-09-04 ASSESSMENT — PAIN SCALES - GENERAL: PAINLEVEL_OUTOF10: 2

## 2020-09-04 ASSESSMENT — PAIN DESCRIPTION - LOCATION: LOCATION: TEETH

## 2020-09-04 NOTE — ED PROVIDER NOTES
[facility] ED  Emergency Department Encounter  EmergencyMedicine Resident     Pt Florentino Del Rosario  MRN: [id number]  [place] 1990  Date of evaluation: 9/4/20  PCP:  Celio Dooley MD    CHIEF COMPLAINT       Chief Complaint   Patient presents with   [facility] Vaginal Discharge     \"fishy smell\" yellow discharge x 1 week     Dental Pain     for a few weeks, wants a dentist list,       HISTORY OF PRESENT ILLNESS  (Location/Symptom, Timing/Onset, Context/Setting, Quality, Duration, Modifying Factors, Severity.)      Sunita Ellsworth is a 35 y.o. female who presents with concern for vaginal discharge for 1 week. Patient says she has noticed a fishy smell with yellow discharge, patient states she also has lesions on her labia which she has been seen for previously, denies any pain associated with them. Patient she is sexually active, does have a Mirena. Patient would like to be prophylactic treated for gonorrhea and chlamydia. Patient denies any other medical complaints at this time to include nausea, vomiting, fever, chills, abdominal pain. Patient denies any dysuria or hematuria. PAST MEDICAL / SURGICAL / SOCIAL / FAMILY HISTORY      has a past medical history of Asthma, Bipolar disorder, mixed ([address] Cannabis abuse, Chronic hypertension, Depression, Diabetes mellitus ([address] Obesity, and Polycystic ovarian disease. has a past surgical history that includes Tonsillectomy and adenoidectomy and Melissa tooth extraction.     Social History     Socioeconomic History    Marital status: Single     Spouse name: Not on file    Number of children: Not on file    Years of education: Not on file    Highest education level: Not on file   Occupational History    Not on file   Social Needs    Financial resource strain: Not on file    Food insecurity     Worry: Not on file     Inability: Not on file    Transportation needs     Medical: Not on file     Non-medical: Not on file   Tobacco Use    Smoking status: Current Every Day Smoker     Packs/day: 1.00     Years: 10.00     Pack years: 10.00     Types: Cigarettes    Smokeless tobacco: Never Used    Tobacco comment: Accepting of Nicotine replacement: Patch    Substance and Sexual Activity    Alcohol use: Yes     Comment: socially    Drug use: No    Sexual activity: Not Currently     Partners: Female, Male   Lifestyle    Physical activity     Days per week: Not on file     Minutes per session: Not on file    Stress: Not on file   Relationships    Social connections     Talks on phone: Not on file     Gets together: Not on file     Attends Advent service: Not on file     Active member of club or organization: Not on file     Attends meetings of clubs or organizations: Not on file     Relationship status: Not on file    Intimate partner violence     Fear of current or ex partner: Not on file     Emotionally abused: Not on file     Physically abused: Not on file     Forced sexual activity: Not on file   Other Topics Concern    Not on file   Social History Narrative    Not on file       Family History   Problem Relation Age of Onset    High Blood Pressure Mother     Diabetes Maternal Grandfather     High Blood Pressure Maternal Grandfather     Schizophrenia Maternal Aunt     Bipolar Disorder Maternal Aunt     Schizophrenia Maternal Aunt     Colon Cancer Neg Hx     Breast Cancer Neg Hx     Uterine Cancer Neg Hx     Ovarian Cancer Neg Hx        Allergies:  Latex; Fruit & vegetable daily [nutritional supplements]; and Seasonal    Home Medications:  Prior to Admission medications    Medication Sig Start Date End Date Taking?  Authorizing Provider   penicillin v potassium (VEETID) 500 MG tablet Take 1 tablet by mouth 2 times daily for 7 days 9/4/20 9/11/20 Yes Marj Manuel DO   albuterol sulfate HFA (VENTOLIN HFA) 108 (90 Base) MCG/ACT inhaler Inhale 2 puffs into the lungs every 6 hours as needed for Wheezing 8/11/20   Stephane Jarrell MD diphenhydrAMINE (BENADRYL) 25 MG capsule Take 1 capsule by mouth every 4 hours as needed for Itching 8/11/20   Stephane Jarrell MD   fluticasone (FLONASE) 50 MCG/ACT nasal spray 1 spray by Nasal route 2 times daily 8/11/20   Stephane Jarrell MD   nicotine (NICODERM CQ) 21 MG/24HR Place 1 patch onto the skin daily 8/11/20 9/22/20  Stephane Jarrell MD   amLODIPine (NORVASC) 5 MG tablet Take 1 tablet by mouth daily 8/11/20   Stephane Jarrell MD   FLUoxetine (PROZAC) 40 MG capsule Take 1 capsule by mouth daily 1/8/17   Omar Gottlieb MD   ziprasidone (GEODON) 40 MG capsule Take 1 capsule by mouth 2 times daily (with meals) 1/8/17   Omar Gottlieb MD   traZODone (DESYREL) 50 MG tablet Take 1 tablet by mouth nightly as needed for Sleep 1/8/17   Omar Gottlieb MD       REVIEW OF SYSTEMS    (2-9 systems for level 4, 10 or more for level 5)      Review of Systems   Constitutional: Negative for activity change, chills and fever. HENT: Positive for dental problem. Respiratory: Negative for shortness of breath. Cardiovascular: Negative for chest pain. Gastrointestinal: Negative for abdominal pain, nausea and vomiting. Genitourinary: Positive for vaginal discharge. Negative for decreased urine volume, genital sores, hematuria, menstrual problem, pelvic pain, urgency, vaginal bleeding and vaginal pain. PHYSICAL EXAM   (up to 7 for level 4, 8 or more for level 5)      INITIAL VITALS:   BP (!) 140/84   Pulse 78   Temp 97.3 °F (36.3 °C) (Oral)   Resp 16   Ht 5' 7\" (1.702 m)   Wt (!) 323 lb (146.5 kg)   SpO2 100%   BMI 50.59 kg/m²     Physical Exam  Constitutional:       General: She is not in acute distress. Appearance: She is obese. She is not ill-appearing, toxic-appearing or diaphoretic. HENT:      Head: Normocephalic. Mouth/Throat:      Comments: Dental caries with no visible or palpable abscess  Cardiovascular:      Rate and Rhythm: Normal rate.    Pulmonary:      Effort: Pulmonary effort is normal. Comments: Breathing comfortably room air, symmetric stressors, speaking in full sentences  Abdominal:      General: Abdomen is flat. Tenderness: There is no abdominal tenderness. There is no guarding or rebound. Hernia: No hernia is present. Genitourinary:     Comments: Female nurse Priscila IVY present patient has several flesh-colored raised lesions on her labia majora, do not appear to be papilloma, no fluid, patient does shave this area. No signs of redness or infection. Cervix closed Mirena string visualized, white discharge present in the vaginal vault. Cervix is nonfriable, no adnexal pain  Musculoskeletal:         General: No swelling. Right lower leg: No edema. Left lower leg: No edema. Neurological:      Mental Status: She is alert and oriented to person, place, and time. Gait: Gait normal.         DIFFERENTIAL  DIAGNOSIS     PLAN (LABS / IMAGING / EKG):  Orders Placed This Encounter   Procedures    C.trachomatis N.gonorrhoeae DNA    VAGINITIS DNA PROBE    Urinalysis    Vaginal exam    POCT urine pregnancy       MEDICATIONS ORDERED:  Orders Placed This Encounter   Medications    cefTRIAXone (ROCEPHIN) injection 250 mg    azithromycin (ZITHROMAX) tablet 1,000 mg    penicillin v potassium (VEETID) 500 MG tablet     Sig: Take 1 tablet by mouth 2 times daily for 7 days     Dispense:  14 tablet     Refill:  0       DDX: UTI, STD, dental caries, abscess    DIAGNOSTIC RESULTS / EMERGENCY DEPARTMENT COURSE / MDM   :  Results for orders placed or performed during the hospital encounter of 09/04/20   VAGINITIS DNA PROBE    Specimen: Vaginal   Result Value Ref Range    Specimen Description . VAGINA     Special Requests NOT REPORTED     Direct Exam NEGATIVE for Candida sp.      Direct Exam NEGATIVE for Gardnerella vaginalis     Direct Exam NEGATIVE for Trichomonas vaginalis     Direct Exam       Method of testing is a DNA probe intended for detection and identification of Candida [address] [facility] [address] [phone]  In 2 days  For complete STD testing    Regina Delgadillo [facility]  [address]  [phone]  Today  To make an appointment    [facility]  [address] [facility] [address]  [phone]    As needed, If symptoms worsen      DISCHARGE MEDICATIONS:  Discharge Medication List as of 9/4/2020  3:41 PM          Meghna Baez DO  Emergency Medicine Resident    (Please note that portions of thisnote were completed with a voice recognition program.  Efforts were made to edit the dictations but occasionally words are mis-transcribed.)     Meghna Baez DO  Resident  09/04/20 0037

## 2020-09-04 NOTE — ED NOTES
Urine collected; labeled & sent to lab;  Pt denies needs, currently; will cont to monitor; call light within reach     [facility], RN  09/04/20 2627

## 2020-09-04 NOTE — ED PROVIDER NOTES
[facility] ED     Emergency Department     Faculty Attestation        I performed a history and physical examination of the patient and discussed management with the resident. I reviewed the residents note and agree with the documented findings and plan of care. Any areas of disagreement are noted on the chart. I was personally present for the key portions of any procedures. I have documented in the chart those procedures where I was not present during the key portions. I have reviewed the emergency nurses triage note. I agree with the chief complaint, past medical history, past surgical history, allergies, medications, social and family history as documented unless otherwise noted below. For Physician Assistant/ Nurse Practitioner cases/documentation I have personally evaluated this patient and have completed at least one if not all key elements of the E/M (history, physical exam, and MDM). Additional findings are as noted. Vital Signs: BP: (!) 140/84  Pulse: 78  Resp: 16  Temp: 97.3 °F (36.3 °C) SpO2: 100 %  PCP:  Amadeo Hall MD    Pertinent Comments:         Critical Care  None    This patient was evaluated in the Emergency Department for symptoms described in the history of present illness. He/she was evaluated in the context of the global COVID-19 pandemic, which necessitated consideration that the patient might be at risk for infection with the SARS-CoV-2 virus that causes COVID-19. Institutional protocols and algorithms that pertain to the evaluation of patients at risk for COVID-19 are in a state of rapid change based on information released by regulatory bodies including the CDC and federal and state organizations. These policies and algorithms were followed during the patient's care in the ED.     (Please note that portions of this note were completed with a voice recognition program. Efforts were made to edit the dictations but occasionally words are mis-transcribed.  Whenever words are used in this note in any gender, they shall be construed as though they were used in the gender appropriate to the circumstances; and whenever words are used in this note in the singular or plural form, they shall be construed as though they were used in the form appropriate to the circumstances.)    MD Millie Ann  Attending Emergency Medicine Physician           Ty Beasley MD  09/04/20 0196

## 2020-09-08 RX ORDER — FLUTICASONE PROPIONATE 50 MCG
SPRAY, SUSPENSION (ML) NASAL
Qty: 16 G | Refills: 0 | Status: SHIPPED | OUTPATIENT
Start: 2020-09-08 | End: 2021-04-30

## 2020-09-18 ENCOUNTER — TELEPHONE (OUTPATIENT)
Dept: CARE COORDINATION | Age: 30
End: 2020-09-18

## 2020-09-24 NOTE — TELEPHONE ENCOUNTER
Outreach call for continued conversation and goals of ACP. Left voicemail with contact information for a return call.

## 2020-10-09 ENCOUNTER — TELEPHONE (OUTPATIENT)
Dept: CARE COORDINATION | Age: 30
End: 2020-10-09

## 2020-10-09 NOTE — TELEPHONE ENCOUNTER
Outreach call for follow up on ACP Plans . There was an answer and then a hang up as ACP Specialist asked for patient. Will make another attempt, if negative response will close referral and send back to PCP.

## 2020-10-12 ENCOUNTER — TELEPHONE (OUTPATIENT)
Dept: CARE COORDINATION | Age: 30
End: 2020-10-12

## 2020-10-12 NOTE — TELEPHONE ENCOUNTER
Outreach call to patient regarding her interest in the ACP process. Patient stated that she is interested and would like documents emailed to her. ACP Specialist emailed documents to patient and will follow up for appointment.

## 2020-10-13 ENCOUNTER — TELEPHONE (OUTPATIENT)
Dept: CARE COORDINATION | Age: 30
End: 2020-10-13

## 2020-11-02 ENCOUNTER — HOSPITAL ENCOUNTER (EMERGENCY)
Age: 30
Discharge: HOME | End: 2020-11-02
Attending: EMERGENCY MEDICINE
Payer: MEDICARE

## 2020-11-02 VITALS
RESPIRATION RATE: 18 BRPM | BODY MASS INDEX: 45.99 KG/M2 | SYSTOLIC BLOOD PRESSURE: 152 MMHG | TEMPERATURE: 98.4 F | WEIGHT: 293 LBS | HEIGHT: 67 IN | DIASTOLIC BLOOD PRESSURE: 97 MMHG | HEART RATE: 80 BPM | OXYGEN SATURATION: 100 %

## 2020-11-02 PROCEDURE — 6360000002 HC RX W HCPCS: Performed by: STUDENT IN AN ORGANIZED HEALTH CARE EDUCATION/TRAINING PROGRAM

## 2020-11-02 PROCEDURE — 6370000000 HC RX 637 (ALT 250 FOR IP): Performed by: STUDENT IN AN ORGANIZED HEALTH CARE EDUCATION/TRAINING PROGRAM

## 2020-11-02 PROCEDURE — 99282 EMERGENCY DEPT VISIT SF MDM: CPT

## 2020-11-02 PROCEDURE — 96372 THER/PROPH/DIAG INJ SC/IM: CPT

## 2020-11-02 RX ORDER — AMOXICILLIN AND CLAVULANATE POTASSIUM 875; 125 MG/1; MG/1
1 TABLET, FILM COATED ORAL EVERY 12 HOURS SCHEDULED
Status: DISCONTINUED | OUTPATIENT
Start: 2020-11-02 | End: 2020-11-02 | Stop reason: HOSPADM

## 2020-11-02 RX ORDER — ACETAMINOPHEN 325 MG/1
650 TABLET ORAL ONCE
Status: COMPLETED | OUTPATIENT
Start: 2020-11-02 | End: 2020-11-02

## 2020-11-02 RX ORDER — IBUPROFEN 800 MG/1
800 TABLET ORAL EVERY 8 HOURS PRN
Qty: 30 TABLET | Refills: 0 | Status: ON HOLD | OUTPATIENT
Start: 2020-11-02 | End: 2022-05-14

## 2020-11-02 RX ORDER — KETOROLAC TROMETHAMINE 30 MG/ML
30 INJECTION, SOLUTION INTRAMUSCULAR; INTRAVENOUS ONCE
Status: COMPLETED | OUTPATIENT
Start: 2020-11-02 | End: 2020-11-02

## 2020-11-02 RX ORDER — AMOXICILLIN AND CLAVULANATE POTASSIUM 875; 125 MG/1; MG/1
1 TABLET, FILM COATED ORAL 2 TIMES DAILY
Qty: 20 TABLET | Refills: 0 | Status: SHIPPED | OUTPATIENT
Start: 2020-11-02 | End: 2020-11-12

## 2020-11-02 RX ADMIN — ACETAMINOPHEN 650 MG: 325 TABLET ORAL at 08:29

## 2020-11-02 RX ADMIN — AMOXICILLIN AND CLAVULANATE POTASSIUM 1 TABLET: 875; 125 TABLET, FILM COATED ORAL at 08:29

## 2020-11-02 RX ADMIN — KETOROLAC TROMETHAMINE 30 MG: 30 INJECTION, SOLUTION INTRAMUSCULAR; INTRAVENOUS at 08:29

## 2020-11-02 ASSESSMENT — ENCOUNTER SYMPTOMS
SORE THROAT: 0
FACIAL SWELLING: 0
ABDOMINAL PAIN: 0
SHORTNESS OF BREATH: 0

## 2020-11-02 ASSESSMENT — PAIN DESCRIPTION - ORIENTATION: ORIENTATION: UPPER;LOWER;LEFT;RIGHT

## 2020-11-02 ASSESSMENT — PAIN SCALES - GENERAL: PAINLEVEL_OUTOF10: 10

## 2020-11-02 ASSESSMENT — PAIN DESCRIPTION - LOCATION: LOCATION: MOUTH;TEETH

## 2020-11-02 ASSESSMENT — PAIN DESCRIPTION - PAIN TYPE: TYPE: ACUTE PAIN

## 2020-11-02 NOTE — ED PROVIDER NOTES
[address] [facility]  ED  Emergency Department Encounter  Emergency Medicine Resident     Pt Name: Luke Castanon  MRN: [id number]  [place] 1990  Date of evaluation: 11/2/20  PCP:  MD Mallorie Ryan       Chief Complaint   Patient presents with    Dental Pain     Right, upper and left, lower dental pain, needs teeth pulled but as not referred to an oral surgeon       HISTORY OFPRESENT ILLNESS  (Location/Symptom, Timing/Onset, Context/Setting, Quality, Duration, Modifying Lyly Kohut.)      Luke Castanon is a 35year old female who presents with right upper and left lower dental pain. Patient reports that her symptoms have been going on for several weeks. The patient was evaluated by dentist and requested follow-up with an oral surgeon because she wanted general anesthesia for her procedures. The patient was evaluated in September for dental infection and prescribed penicillin at that time. The patient reports initial improvement in her symptoms after antibiotic course. No fevers at home. The patient is complaining of some pain with swallowing. She is able to tolerate solids and liquids at home. PAST MEDICAL / SURGICAL / SOCIAL / FAMILY HISTORY      has a past medical history of Asthma, Bipolar disorder, mixed ([address] Cannabis abuse, Chronic hypertension, Depression, Diabetes mellitus ([address] Obesity, and Polycystic ovarian disease.      has a past surgical history that includes Tonsillectomy and adenoidectomy and [place] tooth extraction. =    Social History     Socioeconomic History    Marital status: Single     Spouse name: Not on file    Number of children: Not on file    Years of education: Not on file    Highest education level: Not on file   Occupational History    Not on file   Social Needs    Financial resource strain: Not on file    Food insecurity     Worry: Not on file     Inability: Not on file    Transportation needs     Medical: Not on file Non-medical: Not on file   Tobacco Use    Smoking status: Current Every Day Smoker     Packs/day: 1.00     Years: 10.00     Pack years: 10.00     Types: Cigarettes    Smokeless tobacco: Never Used    Tobacco comment: Accepting of Nicotine replacement: Patch    Substance and Sexual Activity    Alcohol use: Yes     Comment: socially    Drug use: No    Sexual activity: Not Currently     Partners: Female, Male   Lifestyle    Physical activity     Days per week: Not on file     Minutes per session: Not on file    Stress: Not on file   Relationships    Social connections     Talks on phone: Not on file     Gets together: Not on file     Attends Scientologist service: Not on file     Active member of club or organization: Not on file     Attends meetings of clubs or organizations: Not on file     Relationship status: Not on file    Intimate partner violence     Fear of current or ex partner: Not on file     Emotionally abused: Not on file     Physically abused: Not on file     Forced sexual activity: Not on file   Other Topics Concern    Not on file   Social History Narrative    Not on file       Family History   Problem Relation Age of Onset    High Blood Pressure Mother     Diabetes Maternal Grandfather     High Blood Pressure Maternal Grandfather     Schizophrenia Maternal Aunt     Bipolar Disorder Maternal Aunt     Schizophrenia Maternal Aunt     Colon Cancer Neg Hx     Breast Cancer Neg Hx     Uterine Cancer Neg Hx     Ovarian Cancer Neg Hx         Allergies:  Latex; Fruit & vegetable daily [nutritional supplements]; and Seasonal    Home Medications:  Prior to Admission medications    Medication Sig Start Date End Date Taking?  Authorizing Provider   amoxicillin-clavulanate (AUGMENTIN) 875-125 MG per tablet Take 1 tablet by mouth 2 times daily for 10 days 11/2/20 11/12/20 Yes Yumiko Thurston MD   ibuprofen (ADVIL;MOTRIN) 800 MG tablet Take 1 tablet by mouth every 8 hours as needed for Pain 11/2/20  Yes submandibular swelling, no elevation of tongue  Eyes:      Extraocular Movements: Extraocular movements intact. Conjunctiva/sclera: Conjunctivae normal.      Pupils: Pupils are equal, round, and reactive to light. Neck:      Musculoskeletal: Neck supple. Pulmonary:      Effort: Pulmonary effort is normal. No respiratory distress. Breath sounds: Normal breath sounds. No wheezing or rales. Lymphadenopathy:      Cervical: No cervical adenopathy. Skin:     General: Skin is warm. Neurological:      Mental Status: She is alert and oriented to person, place, and time. DIFFERENTIAL  DIAGNOSIS     PLAN (LABS / IMAGING / EKG):  No orders of the defined types were placed in this encounter. MEDICATIONS ORDERED:  Orders Placed This Encounter   Medications    ketorolac (TORADOL) injection 30 mg    DISCONTD: amoxicillin-clavulanate (AUGMENTIN) 875-125 MG per tablet 1 tablet    acetaminophen (TYLENOL) tablet 650 mg    amoxicillin-clavulanate (AUGMENTIN) 875-125 MG per tablet     Sig: Take 1 tablet by mouth 2 times daily for 10 days     Dispense:  20 tablet     Refill:  0    ibuprofen (ADVIL;MOTRIN) 800 MG tablet     Sig: Take 1 tablet by mouth every 8 hours as needed for Pain     Dispense:  30 tablet     Refill:  0       Initial MDM/Plan: 35 y.o. female who presents with dental pain. The patient was afebrile on arrival.  No concerning signs for Isaac's, trismus or dental abscess on evaluation. The pain patient is having dental pain right lower and left upper tooth but no obvious abscess formation on evaluation. Plan for Augmentin and Toradol. DIAGNOSTIC RESULTS / EMERGENCYDEPARTMENT COURSE / MDM     LABS:  Labs Reviewed - No data to display      RADIOLOGY:  No results found.       EKG      All EKG's are interpreted by the Emergency Department Physicianwho either signs or Co-signs this chart in the absence of a cardiologist.    EMERGENCY DEPARTMENT COURSE:      The patient was started on

## 2020-11-07 ENCOUNTER — TELEPHONE (OUTPATIENT)
Dept: SPIRITUAL SERVICES | Age: 30
End: 2020-11-07

## 2020-11-07 NOTE — ACP (ADVANCE CARE PLANNING)
Advance Care Planning     Advance Care Planning Clinical Specialist  Conversation Note      Date of ACP Conversation: 11/7/2020    Conversation Conducted with: Patient with Decision Making Capacity    ACP Clinical Specialist: Concepcion Vazquez    Length of ACP Conversation in minutes:  10 min    Called and attempted to set up a time to fill out ACP documents with the patient. The patient declines interest and does not want a follow up conversation.     Conversation Outcomes:  [x] ACP discussion completed  [] Existing advance directive reviewed with patient; no changes to patient's previously recorded wishes  [] New Advance Directive completed  [] Portable Do Not Rescitate prepared for Provider review and signature  [] POLST/POST/MOLST/MOST prepared for Provider review and signature      Follow-up plan:    [] Schedule follow-up conversation to continue planning  [] Referred individual to Provider for additional questions/concerns   [] Advised patient/agent/surrogate to review completed ACP document and update if needed with changes in condition, patient preferences or care setting    [x] This note routed to one or more involved healthcare providers

## 2020-11-13 ENCOUNTER — OFFICE VISIT (OUTPATIENT)
Dept: PODIATRY | Age: 30
End: 2020-11-13
Payer: MEDICARE

## 2020-11-13 VITALS — BODY MASS INDEX: 45.99 KG/M2 | TEMPERATURE: 98.1 F | WEIGHT: 293 LBS | HEIGHT: 67 IN

## 2020-11-13 PROCEDURE — G8427 DOCREV CUR MEDS BY ELIG CLIN: HCPCS | Performed by: PODIATRIST

## 2020-11-13 PROCEDURE — 99213 OFFICE O/P EST LOW 20 MIN: CPT | Performed by: PODIATRIST

## 2020-11-13 PROCEDURE — 4004F PT TOBACCO SCREEN RCVD TLK: CPT | Performed by: PODIATRIST

## 2020-11-13 PROCEDURE — 11721 DEBRIDE NAIL 6 OR MORE: CPT | Performed by: PODIATRIST

## 2020-11-13 PROCEDURE — G8484 FLU IMMUNIZE NO ADMIN: HCPCS | Performed by: PODIATRIST

## 2020-11-13 PROCEDURE — G8417 CALC BMI ABV UP PARAM F/U: HCPCS | Performed by: PODIATRIST

## 2020-11-13 RX ORDER — CLOTRIMAZOLE AND BETAMETHASONE DIPROPIONATE 10; .64 MG/G; MG/G
CREAM TOPICAL
Qty: 45 G | Refills: 2 | Status: SHIPPED | OUTPATIENT
Start: 2020-11-13 | End: 2022-05-24 | Stop reason: SDUPTHER

## 2020-11-13 RX ORDER — HYDROCORTISONE VALERATE 2 MG/G
OINTMENT TOPICAL
Qty: 1 TUBE | Refills: 3 | Status: SHIPPED | OUTPATIENT
Start: 2020-11-13

## 2020-11-17 ENCOUNTER — OFFICE VISIT (OUTPATIENT)
Dept: INTERNAL MEDICINE | Age: 30
End: 2020-11-17
Payer: MEDICARE

## 2020-11-17 VITALS
DIASTOLIC BLOOD PRESSURE: 86 MMHG | HEIGHT: 67 IN | WEIGHT: 293 LBS | HEART RATE: 83 BPM | SYSTOLIC BLOOD PRESSURE: 137 MMHG | BODY MASS INDEX: 45.99 KG/M2

## 2020-11-17 PROCEDURE — G8427 DOCREV CUR MEDS BY ELIG CLIN: HCPCS | Performed by: STUDENT IN AN ORGANIZED HEALTH CARE EDUCATION/TRAINING PROGRAM

## 2020-11-17 PROCEDURE — 4004F PT TOBACCO SCREEN RCVD TLK: CPT | Performed by: STUDENT IN AN ORGANIZED HEALTH CARE EDUCATION/TRAINING PROGRAM

## 2020-11-17 PROCEDURE — G8417 CALC BMI ABV UP PARAM F/U: HCPCS | Performed by: STUDENT IN AN ORGANIZED HEALTH CARE EDUCATION/TRAINING PROGRAM

## 2020-11-17 PROCEDURE — G8484 FLU IMMUNIZE NO ADMIN: HCPCS | Performed by: STUDENT IN AN ORGANIZED HEALTH CARE EDUCATION/TRAINING PROGRAM

## 2020-11-17 PROCEDURE — 99213 OFFICE O/P EST LOW 20 MIN: CPT | Performed by: STUDENT IN AN ORGANIZED HEALTH CARE EDUCATION/TRAINING PROGRAM

## 2020-11-17 RX ORDER — ACETAMINOPHEN 500 MG
500 TABLET ORAL 4 TIMES DAILY PRN
Qty: 120 TABLET | Refills: 0 | Status: SHIPPED | OUTPATIENT
Start: 2020-11-17 | End: 2020-12-09 | Stop reason: SDUPTHER

## 2020-11-17 ASSESSMENT — ENCOUNTER SYMPTOMS
RESPIRATORY NEGATIVE: 1
EYES NEGATIVE: 1
GASTROINTESTINAL NEGATIVE: 1

## 2020-11-17 NOTE — PROGRESS NOTES
Patient is here to obtain orthotics for her bilateral feet for fallen arches. She has flatfeet. She is morbidly obese. She is seeing podiatry and apparently they have written prescriptions for her. I am unsure what we can do to help her as she needs orthotics that need to be custom ordered for her by podiatry. Will have her contact podiatry office for another prescription and if we need to cosign it will help her with that. Will contact podiatry office ourselves as well and her pharmacy. She is advised to see a dentist.  She has had some problems with dental caries and abscesses. She is morbidly obese. She needs to work on diet and exercise. She follows up with psychiatry for bipolar disorder. Attending Physician Statement  I have discussed the care of Paloma Goodwin, including pertinent history and exam findings,  with the resident. I have reviewed the key elements of all parts of the encounter with the resident. I agree with the assessment, plan and orders as documented by the resident.   (GE Modifier)

## 2020-11-17 NOTE — PROGRESS NOTES
@[facility]@    [facility]    Progress Note    Date of patient's visit: 11/17/2020    Patient's Name:  Marleen Ballesteros    YOB: 1990            Patient Care Team:  Marilyn Grier MD as PCP - General (Internal Medicine)  Ara Leung DPM as Physician (Podiatry)    REASON FOR VISIT: Routine outpatient follow     Chief Complaint   Patient presents with    Medication Refill     patient needs completion of dme form for diabetic shoe fitting          HISTORY OF PRESENT ILLNESS:    History was obtained from the patient. Marleen Ballesteros is a 35 y.o. is here for order for orthopedic shoes. The order was sent before however, nothing was faxed back to us. Also having dental pain. Went to the ED on 11/02 and got antibiotics and ibuprofen. She is going to see her dentist soon. Past Medical History:   Diagnosis Date    Asthma     Has not needed inhaler since 2009    Bipolar disorder, mixed ([address]     Cannabis abuse 12/31/2016    Chronic hypertension 11/25/2019    Depression     Diabetes mellitus ([address] 11/20/2013    borderline    Obesity 8/1/2013    Polycystic ovarian disease        Past Surgical History:   Procedure Laterality Date    TONSILLECTOMY AND ADENOIDECTOMY      WISDOM TOOTH EXTRACTION           ALLERGIES      Allergies   Allergen Reactions    Latex      Rash     Fruit & Vegetable Daily [Nutritional Supplements]      Cataloupe,honeydew,grapes,watermelon,bananas,apples,pineapples    Seasonal        MEDICATIONS:      Current Outpatient Medications on File Prior to Visit   Medication Sig Dispense Refill    Misc. Devices MISC 1 PAIR OF DIABETIC SHOES (1 LEFT/ 1 RIGHT)  1 -3 PAIRS OF INSERTS (LEFT/ RIGHT) 2 each 0    hydrocortisone valerate (WESTCORT) 0.2 % ointment Apply topically daily. 1 Tube 3    clotrimazole-betamethasone (LOTRISONE) 1-0.05 % cream Apply topically 2 times daily.  45 g 2    ibuprofen (ADVIL;MOTRIN) 800 MG tablet Take 1 tablet by mouth every 8 hours as needed for Pain 30 tablet 0    fluticasone (FLONASE) 50 MCG/ACT nasal spray instill 1 spray into each nostril twice a day 16 g 0    albuterol sulfate HFA (VENTOLIN HFA) 108 (90 Base) MCG/ACT inhaler Inhale 2 puffs into the lungs every 6 hours as needed for Wheezing 1 Inhaler 1    diphenhydrAMINE (BENADRYL) 25 MG capsule Take 1 capsule by mouth every 4 hours as needed for Itching 15 capsule 0    amLODIPine (NORVASC) 5 MG tablet Take 1 tablet by mouth daily 30 tablet 2    FLUoxetine (PROZAC) 40 MG capsule Take 1 capsule by mouth daily 30 capsule 0    ziprasidone (GEODON) 40 MG capsule Take 1 capsule by mouth 2 times daily (with meals) 60 capsule 0    traZODone (DESYREL) 50 MG tablet Take 1 tablet by mouth nightly as needed for Sleep 30 tablet 0     Current Facility-Administered Medications on File Prior to Visit   Medication Dose Route Frequency Provider Last Rate Last Dose    levonorgestrel (MIRENA) IUD 52 mg 1 each  1 each Intrauterine Once Price Cramp   1 each at 01/07/19 1644       SOCIAL HISTORY    Reviewed and no change from previous record. Edgardo Bernabe  reports that she has been smoking cigarettes. She has a 10.00 pack-year smoking history. She has never used smokeless tobacco.    FAMILY HISTORY:    Reviewed and No change from previous visit    HEALTH MAINTENANCE DUE:      Health Maintenance Due   Topic Date Due    Varicella vaccine (1 of 2 - 2-dose childhood series) 01/26/1992       REVIEW OF SYSTEMS:    12 point review of symptoms completed and found to be normal except noted in the HPI    Review of Systems   Constitutional: Negative for chills and fever. HENT: Positive for dental problem. Negative for congestion. Eyes: Negative. Respiratory: Negative. Cardiovascular: Negative. Gastrointestinal: Negative. Endocrine: Negative. Genitourinary: Negative. Musculoskeletal: Negative. Neurological: Negative.             PHYSICAL EXAM:      Vitals:    11/17/20 3052 11/17/20 0846   BP: (!) 140/84 137/86   Site: Right Upper Arm Right Upper Arm   Position: Sitting Sitting   Cuff Size: Large Adult Large Adult   Pulse: 83    Weight: (!) 329 lb (149.2 kg)    Height: 5' 7\" (1.702 m)      Body mass index is 51.53 kg/m². BP Readings from Last 3 Encounters:   11/17/20 137/86   11/02/20 (!) 152/97   09/04/20 (!) 140/84        Wt Readings from Last 3 Encounters:   11/17/20 (!) 329 lb (149.2 kg)   11/13/20 (!) 333 lb (151 kg)   11/02/20 (!) 333 lb (151 kg)           Physical Exam  Vitals signs and nursing note reviewed. Constitutional:       General: She is not in acute distress. Appearance: She is well-developed. She is not diaphoretic. HENT:      Head: Normocephalic and atraumatic. Comments: Dental caries present on right upper molars and left lower molars. No abscess or purulent drainage. Eyes:      General:         Right eye: No discharge. Left eye: No discharge. Pupils: Pupils are equal, round, and reactive to light. Neck:      Musculoskeletal: Normal range of motion and neck supple. Trachea: No tracheal deviation. Skin:     General: Skin is warm and dry. Coloration: Skin is not pale. Findings: No rash. Neurological:      Motor: No abnormal muscle tone.       Deep Tendon Reflexes: Reflexes normal.   Psychiatric:         Behavior: Behavior normal.           LABORATORY FINDINGS:    CBC:  Lab Results   Component Value Date    WBC 9.8 08/11/2020    HGB 13.2 08/11/2020     08/11/2020     06/01/2012     BMP:    Lab Results   Component Value Date     08/11/2020    K 4.1 08/11/2020     08/11/2020    CO2 23 08/11/2020    BUN 14 08/11/2020    CREATININE 1.00 08/11/2020    GLUCOSE 98 08/11/2020    GLUCOSE 157 06/01/2012     HEMOGLOBIN A1C:   Lab Results   Component Value Date    LABA1C 5.4 08/11/2020     MICROALBUMIN URINE:   Lab Results   Component Value Date    MICROALBUR <12 01/10/2019     FASTING LIPID PANEL:  Lab Results   Component Value Date    CHOL 231 (H) 01/10/2019    HDL 48 01/10/2019    HDL 48 01/10/2019    TRIG 124 01/10/2019     Lab Results   Component Value Date    LDLCHOLESTEROL 158 (H) 01/10/2019    LDLCHOLESTEROL 158 (H) 01/10/2019       LIVER PROFILE:  Lab Results   Component Value Date    ALT 26 01/10/2019    AST 17 01/10/2019    PROT 7.7 01/10/2019    BILITOT 0.24 01/10/2019    LABALBU 4.1 01/10/2019      THYROID FUNCTION:   Lab Results   Component Value Date    TSH 1.06 01/10/2019      URINE ANALYSIS: No results found for: LABURIN  ASSESSMENT AND PLAN:    1. Flat feet, bilateral    - DME Order for Orthosis as OP  -We will get in contact with Keke PEREIRA on central for form to fax over to us. Will call her to let her know it is done    2. Dental caries    - acetaminophen (TYLENOL) 500 MG tablet; Take 1 tablet by mouth 4 times daily as needed for Pain  Dispense: 120 tablet; Refill: 0          Health Maintenance      FOLLOW UP AND INSTRUCTIONS:   Return in about 6 months (around 5/17/2021) for diabetic follow up, obesity. 1. Nigel Wing received counseling on the following healthy behaviors: nutrition, exercise and tobacco cessation    2. Reviewed prior labs and health maintenance. 3. Discussed use, benefit, and side effects of prescribed medications. Barriers to medication compliance addressed. All patient questions answered. Pt voiced understanding.      4. Patient given educational materials - see patient instructions               Sybil Rosario MD  PGY-3, Internal medicine resident  [address] [place] [address]  11/17/2020 9:14 AM

## 2020-11-19 NOTE — PROGRESS NOTES
[facility]YSICIANS  [facility]  [address] [place] [address]  Dept: [phone]  Dept Fax: [phone]    RETURN PATIENT PROGRESS NOTE  Date of patient's visit: 11/19/2020  Patient's Name:  Ghazala Livingston YOB: 1990            Patient Care Team:  Millicent Urbano MD as PCP - General (Internal Medicine)  Latasha Payton DPM as Physician (Podiatry)       Ghazala Livingston 35 y.o. female that presents for follow-up of   Chief Complaint   Patient presents with    Ankle Pain     Bilateral       Symptoms began several week(s) ago and are unchanged . She relates to blister to the left foot with dry itchy skin. Patient relates pain is Present. Pain is rated 10 out of 10 and is described as constant, moderate, severe. Treatments prior to today's visit include: previous podiatry treatment. Currently denies F/C/N/V. She also has new complaint of ingrown painful toenails that she cannot trim herself. Allergies   Allergen Reactions    Latex      Rash     Fruit & Vegetable Daily [Nutritional Supplements]      Cataloupe,honeydew,grapes,watermelon,bananas,apples,pineapples    Seasonal        Past Medical History:   Diagnosis Date    Asthma     Has not needed inhaler since 2009    B[address] disorder, mixed [address]     Cannabis abuse 12/31/2016    Chronic hypertension 11/25/2019    Depression     Diabetes mellitus ([facility] [address] 11/20/2013    borderline    Obesity 8/1/2013    Polycystic ovarian disease        Prior to Admission medications    Medication Sig Start Date End Date Taking? Authorizing Provider   Misc. Devices MISC 1 PAIR OF DIABETIC SHOES (1 LEFT/ 1 RIGHT)  1 -3 PAIRS OF INSERTS (LEFT/ RIGHT) 11/13/20  Yes Latasha Payton DPM   hydrocortisone valerate (WESTCORT) 0.2 % ointment Apply topically daily. 11/13/20  Yes Nikia Roca DPM   clotrimazole-betamethasone (LOTRISONE) 1-0.05 % cream Apply topically 2 times daily.  11/13/20  Yes Nikia Roca DPM   ibuprofen (ADVIL;MOTRIN) 800 MG tablet Take 1 tablet by mouth every 8 hours as needed for Pain 11/2/20  Yes Yumiko Thurston MD   fluticasone (FLONASE) 50 MCG/ACT nasal spray instill 1 spray into each nostril twice a day 9/8/20  Yes Vickey Arreguin MD   albuterol sulfate HFA (VENTOLIN HFA) 108 (90 Base) MCG/ACT inhaler Inhale 2 puffs into the lungs every 6 hours as needed for Wheezing 8/11/20  Yes Vickey Arreguin MD   diphenhydrAMINE (BENADRYL) 25 MG capsule Take 1 capsule by mouth every 4 hours as needed for Itching 8/11/20  Yes Vickey Arreguin MD   amLODIPine (NORVASC) 5 MG tablet Take 1 tablet by mouth daily 8/11/20  Yes Vickey Arreguin MD   FLUoxetine (PROZAC) 40 MG capsule Take 1 capsule by mouth daily 1/8/17  Yes Cosme Fang MD   ziprasidone (GEODON) 40 MG capsule Take 1 capsule by mouth 2 times daily (with meals) 1/8/17  Yes Cosme Fang MD   traZODone (DESYREL) 50 MG tablet Take 1 tablet by mouth nightly as needed for Sleep 1/8/17  Yes Cosme Fang MD   acetaminophen (TYLENOL) 500 MG tablet Take 1 tablet by mouth 4 times daily as needed for Pain 11/17/20   Mohan Lama MD       Review of Systems    Review of Systems:  History obtained from chart review and the patient  General ROS: negative for - chills, fatigue, fever, night sweats or weight gain  Constitutional: Negative for chills, diaphoresis, fatigue, fever and unexpected weight change. Musculoskeletal: Positive for arthralgias, gait problem and joint swelling. Neurological ROS: negative for - behavioral changes, confusion, headaches or seizures. Negative for weakness and numbness. Dermatological ROS: negative for - mole changes, rash  Cardiovascular: Negative for leg swelling. Gastrointestinal: Negative for constipation, diarrhea, nausea and vomiting. Lower Extremity Physical Examination:     Vitals:   Vitals:    11/13/20 0852   Temp: 98.1 °F (36.7 °C)     General: AAO x 3 in NAD. Dermatologic Exam:  Skin lesion/ulceration Absent . Skin No rashes or nodules noted. Monica Mckoy Skin is thin, with flaky sloughing skin as well as decreased hair growth to the lower leg  Small red hemosiderin deposits seen dorsal foot   Musculoskeletal:     1st MPJ ROM decreased, Bilateral.  Muscle strength 5/5, Bilateral.  Pain present upon palpation of toenails 1-5, Bilateral. decreased medial longitudinal arch, Bilateral.  Ankle ROM decreased,Bilateral.    Dorsally contracted digits present digits 2, Bilateral.     Vascular: DP pulses 1/4 bilateral.  PT pulses 0/4 bilateral.   CFT <5 seconds, Bilateral.  Hair growth absent to the level of the digits, Bilateral.  Edema present, Bilateral.  Varicosities absent, Bilateral. Erythema absent, Bilateral    Neurological: Sensation intact to light touch to level of digits, Bilateral.  Protective sensation intact 10/10 sites via 5.07/10g [place] Monofilament, Bilateral.  negative Tinel's, Bilateral.  negative Valleix sign, Bilateral.      Integument: Warm, dry, supple, Bilateral.  Open lesion absent, Bilateral.  Interdigital maceration absent to web spaces 4, Bilateral.  Nails 1-5 left and 1-5 right thickened > 3.0 mm, dystrophic and crumbly, discolored with subungual debris. Fissures absent, Bilateral.     Asessment: Patient is a 35 y.o. female with:      Diagnosis Orders   1. Tinea pedis of both feet  31905 - [place] DEBRIDEMENT OF NAILS, 6 OR MORE   2. Dermatitis  92104 - [place] DEBRIDEMENT OF NAILS, 6 OR MORE   3. Dermatophytosis of nail  46207 - [place] DEBRIDEMENT OF NAILS, 6 OR MORE   4. Ingrown nail  19308 - [place] DEBRIDEMENT OF NAILS, 6 OR MORE   5. Pain in both lower extremities  20456 - [place] DEBRIDEMENT OF NAILS, 6 OR MORE       Plan: Patient examined and evaluated. Current condition and treatment options discussed in detail. Apply clindamycin gel and alternate with hydrocortisone cream to feet daily. Advised pt to keep clean and dry. Verbal and written instructions given to patient. Contact office with any questions/problems/concerns.      Sharp debridement of nails 1-5 marbella with nail nipper and drummel    No orders of the defined types were placed in this encounter. Orders Placed This Encounter   Medications    Misc. Devices MISC     Si PAIR OF DIABETIC SHOES (1 LEFT/ 1 RIGHT)  1 -3 PAIRS OF INSERTS (LEFT/ RIGHT)     Dispense:  2 each     Refill:  0    hydrocortisone valerate (WESTCORT) 0.2 % ointment     Sig: Apply topically daily. Dispense:  1 Tube     Refill:  3    clotrimazole-betamethasone (LOTRISONE) 1-0.05 % cream     Sig: Apply topically 2 times daily. Dispense:  45 g     Refill:  2        RTC in 1month(s).     2020      Electronically signed by Carlos De La Cruz DPM on 2020 at 12:42 PM  2020

## 2020-12-08 PROCEDURE — 99283 EMERGENCY DEPT VISIT LOW MDM: CPT

## 2020-12-09 ENCOUNTER — HOSPITAL ENCOUNTER (EMERGENCY)
Age: 30
Discharge: HOME | End: 2020-12-09
Attending: EMERGENCY MEDICINE
Payer: MEDICARE

## 2020-12-09 VITALS
RESPIRATION RATE: 18 BRPM | DIASTOLIC BLOOD PRESSURE: 132 MMHG | TEMPERATURE: 97.7 F | OXYGEN SATURATION: 99 % | WEIGHT: 293 LBS | HEART RATE: 79 BPM | HEIGHT: 67 IN | BODY MASS INDEX: 45.99 KG/M2 | SYSTOLIC BLOOD PRESSURE: 218 MMHG

## 2020-12-09 VITALS
RESPIRATION RATE: 16 BRPM | TEMPERATURE: 98.1 F | WEIGHT: 230 LBS | HEIGHT: 67 IN | HEART RATE: 90 BPM | SYSTOLIC BLOOD PRESSURE: 151 MMHG | BODY MASS INDEX: 36.1 KG/M2 | DIASTOLIC BLOOD PRESSURE: 100 MMHG | OXYGEN SATURATION: 100 %

## 2020-12-09 PROCEDURE — 99283 EMERGENCY DEPT VISIT LOW MDM: CPT

## 2020-12-09 PROCEDURE — 6370000000 HC RX 637 (ALT 250 FOR IP): Performed by: EMERGENCY MEDICINE

## 2020-12-09 PROCEDURE — 6370000000 HC RX 637 (ALT 250 FOR IP): Performed by: STUDENT IN AN ORGANIZED HEALTH CARE EDUCATION/TRAINING PROGRAM

## 2020-12-09 RX ORDER — PENICILLIN V POTASSIUM 500 MG/1
500 TABLET ORAL 4 TIMES DAILY
Qty: 28 TABLET | Refills: 0 | Status: SHIPPED | OUTPATIENT
Start: 2020-12-09 | End: 2020-12-16

## 2020-12-09 RX ORDER — HYDROCODONE BITARTRATE AND ACETAMINOPHEN 5; 325 MG/1; MG/1
1 TABLET ORAL ONCE
Status: COMPLETED | OUTPATIENT
Start: 2020-12-09 | End: 2020-12-09

## 2020-12-09 RX ORDER — ACETAMINOPHEN 500 MG
500 TABLET ORAL 3 TIMES DAILY PRN
Qty: 120 TABLET | Refills: 0 | Status: SHIPPED | OUTPATIENT
Start: 2020-12-09 | End: 2022-06-08 | Stop reason: SDUPTHER

## 2020-12-09 RX ORDER — HYDROCODONE BITARTRATE AND ACETAMINOPHEN 5; 325 MG/1; MG/1
1 TABLET ORAL EVERY 6 HOURS PRN
Qty: 6 TABLET | Refills: 0 | Status: SHIPPED | OUTPATIENT
Start: 2020-12-09 | End: 2020-12-16

## 2020-12-09 RX ORDER — PENICILLIN V POTASSIUM 250 MG/1
500 TABLET ORAL ONCE
Status: COMPLETED | OUTPATIENT
Start: 2020-12-09 | End: 2020-12-09

## 2020-12-09 RX ORDER — ACETAMINOPHEN 500 MG
1000 TABLET ORAL ONCE
Status: COMPLETED | OUTPATIENT
Start: 2020-12-09 | End: 2020-12-09

## 2020-12-09 RX ADMIN — PENICILLIN V POTASSIUM 500 MG: 250 TABLET ORAL at 00:21

## 2020-12-09 RX ADMIN — ACETAMINOPHEN 1000 MG: 500 TABLET ORAL at 00:20

## 2020-12-09 RX ADMIN — HYDROCODONE BITARTRATE AND ACETAMINOPHEN 1 TABLET: 5; 325 TABLET ORAL at 11:29

## 2020-12-09 ASSESSMENT — ENCOUNTER SYMPTOMS
NAUSEA: 0
TROUBLE SWALLOWING: 0
VOICE CHANGE: 0
VOMITING: 0
SORE THROAT: 0
ABDOMINAL PAIN: 0
COUGH: 0
SHORTNESS OF BREATH: 0

## 2020-12-09 ASSESSMENT — PAIN DESCRIPTION - ORIENTATION: ORIENTATION: RIGHT;UPPER

## 2020-12-09 ASSESSMENT — PAIN SCALES - GENERAL
PAINLEVEL_OUTOF10: 10

## 2020-12-09 ASSESSMENT — PAIN DESCRIPTION - ONSET: ONSET: SUDDEN

## 2020-12-09 ASSESSMENT — PAIN DESCRIPTION - LOCATION: LOCATION: TEETH

## 2020-12-09 ASSESSMENT — PAIN DESCRIPTION - PAIN TYPE: TYPE: ACUTE PAIN

## 2020-12-09 ASSESSMENT — PAIN DESCRIPTION - PROGRESSION: CLINICAL_PROGRESSION: RAPIDLY WORSENING

## 2020-12-09 NOTE — ED PROVIDER NOTES
INITIAL VITALS:          Temp: 97.7 °F (36.5 °C)    CONSTITUTIONAL: Vital signs reviewed, Alert and oriented X 3. HEAD: Atraumatic, Normocephalic. EYES: Eyes are normal to inspection, Pupils equal, round and reactive to light. NECK: Normal ROM, No jugular venous distention. MOUTH:  + dental pain to percussion at tooth #2/3. Clearly has multiple dental caries as well. No signs of infection at this time. No Clark's angina at all. No swelling involving the airway at all. RESPIRATORY CHEST: No respiratory distress. ABDOMEN: Abdomen is nontender, No distension. UPPER EXTREMITY: Inspection normal, No cyanosis. NEURO: GCS is 15, Speech normal, Memory normal.   SKIN: Skin is warm, Skin is dry. PSYCHIATRIC: Oriented X 3, Normal affect. EMERGENCY DEPARTMENT COURSE:   Pain meds and antibiotic prescriptions. She is to keep her dental appointment as well that she has in 5 days. Return to the ER if worse or any concerns whatsoever      FINAL IMPRESSION:     1. Pain, dental          DISPOSITION:  DISPOSITION Decision To Discharge 12/09/2020 11:23:02 AM        PATIENT REFERRED TO:  [facility] INTERNAL MEDICINE  [address] [place] [id number]  In 3 days  Return to the ER if worse or any concerns at all, Follow up with your doctor in the next 2-3 days. Keep your dental follow-up as well      DISCHARGE MEDICATIONS:  New Prescriptions    HYDROCODONE-ACETAMINOPHEN (NORCO) 5-325 MG PER TABLET    Take 1 tablet by mouth every 6 hours as needed for Pain for up to 6 doses. (Please note that portions of this note were completed with a voice recognition program. Efforts were made to edit the dictations but occasionally words are mis-transcribed.  Whenever words are used in this note in any gender, they shall be construed as though they were used in the gender appropriate to the circumstances; and whenever words are used in this note in the singular or plural form, they shall be construed as though they were used in the form appropriate to the circumstances.)      MD Mervin Kingsley  Attending Emergency Medicine Physician     Emma Durbin MD  12/09/20 1229

## 2020-12-09 NOTE — ED NOTES
Right upper tooth pain, severe, Ibuprofen and tylenol not helping, was to ED last night for same. Face not swollen  Unable to sleep D/T pain.   Dr Calderon  in to examine pt     Gem Barillas RN  12/09/20 5251

## 2020-12-09 NOTE — ED PROVIDER NOTES
Comment: socially    Drug use: No    Sexual activity: Not Currently     Partners: Female, Male   Lifestyle    Physical activity     Days per week: Not on file     Minutes per session: Not on file    Stress: Not on file   Relationships    Social connections     Talks on phone: Not on file     Gets together: Not on file     Attends Mosque service: Not on file     Active member of club or organization: Not on file     Attends meetings of clubs or organizations: Not on file     Relationship status: Not on file    Intimate partner violence     Fear of current or ex partner: Not on file     Emotionally abused: Not on file     Physically abused: Not on file     Forced sexual activity: Not on file   Other Topics Concern    Not on file   Social History Narrative    Not on file       Family History   Problem Relation Age of Onset    High Blood Pressure Mother     Diabetes Maternal Grandfather     High Blood Pressure Maternal Grandfather     Schizophrenia Maternal Aunt     Bipolar Disorder Maternal Aunt     Schizophrenia Maternal Aunt     Colon Cancer Neg Hx     Breast Cancer Neg Hx     Uterine Cancer Neg Hx     Ovarian Cancer Neg Hx         Allergies:  Latex; Fruit & vegetable daily [nutritional supplements]; and Seasonal    Home Medications:  Prior to Admission medications    Medication Sig Start Date End Date Taking? Authorizing Provider   acetaminophen (TYLENOL) 500 MG tablet Take 1 tablet by mouth 3 times daily as needed for Pain 12/9/20  Yes Den Craig, DO   penicillin v potassium (VEETID) 500 MG tablet Take 1 tablet by mouth 4 times daily for 7 days 12/9/20 12/16/20 Yes Den Craig, DO   benzocaine (ORAJEL) 10 % mucosal gel Take 1 g by mouth 2 times daily as needed for Pain Apply to tooth as needed. 12/9/20  Yes Den Craig, DO   Misc.  Devices MISC 1 PAIR OF DIABETIC SHOES (1 LEFT/ 1 RIGHT)  1 -3 PAIRS OF INSERTS (LEFT/ RIGHT) 11/13/20   Leslie Cox DPM   hydrocortisone valerate Wyn Coma) 0.2 % ointment Apply topically daily. 11/13/20   Alanis Ro DPM   clotrimazole-betamethasone (LOTRISONE) 1-0.05 % cream Apply topically 2 times daily. 11/13/20   Alanis Ro DPM   ibuprofen (ADVIL;MOTRIN) 800 MG tablet Take 1 tablet by mouth every 8 hours as needed for Pain 11/2/20   Edyta Thurston MD   fluticasone (FLONASE) 50 MCG/ACT nasal spray instill 1 spray into each nostril twice a day 9/8/20   Adriana Unger MD   albuterol sulfate HFA (VENTOLIN HFA) 108 (90 Base) MCG/ACT inhaler Inhale 2 puffs into the lungs every 6 hours as needed for Wheezing 8/11/20   Adriana Unger MD   diphenhydrAMINE (BENADRYL) 25 MG capsule Take 1 capsule by mouth every 4 hours as needed for Itching 8/11/20   Adriana Unger MD   amLODIPine (NORVASC) 5 MG tablet Take 1 tablet by mouth daily 8/11/20   Adriana Unger MD   FLUoxetine (PROZAC) 40 MG capsule Take 1 capsule by mouth daily 1/8/17   Tonya Rico MD   ziprasidone (GEODON) 40 MG capsule Take 1 capsule by mouth 2 times daily (with meals) 1/8/17   Tonya Rico MD   traZODone (DESYREL) 50 MG tablet Take 1 tablet by mouth nightly as needed for Sleep 1/8/17   Tonya Rico MD       REVIEW OFSYSTEMS    (2-9 systems for level 4, 10 or more for level 5)      Review of Systems   Constitutional: Negative for chills and fever. HENT: Positive for dental problem. Negative for sore throat, trouble swallowing and voice change. Respiratory: Negative for cough and shortness of breath. Cardiovascular: Negative for chest pain, palpitations and leg swelling. Gastrointestinal: Negative for abdominal pain, nausea and vomiting. Neurological: Negative for weakness, light-headedness and numbness.        PHYSICAL EXAM   (up to 7 for level 4, 8 or more forlevel 5)      INITIAL VITALS:   ED Triage Vitals   BP Temp Temp Source Pulse Resp SpO2 Height Weight   12/09/20 0006 12/09/20 0000 12/09/20 0000 12/09/20 0006 12/09/20 0006 12/09/20 0006 12/09/20 0006 12/09/20 0006   (!) 151/100 97 °F (36.1 °C) with dentist.    DIAGNOSTIC RESULTS / [facility]     LABS:  Labs Reviewed - No data to display      RADIOLOGY:  No results found. EKG      All EKG's are interpreted by the [facility] Physician who either signs or Co-signs this chart in the absence of a cardiologist.      PROCEDURES:  None    CONSULTS:  None    CRITICAL CARE:  Please see attending note    FINAL IMPRESSION      1. Pain, dental    2.  Dental caries          DISPOSITION / PLAN     DISPOSITION Decision To Discharge 12/09/2020 12:28:55 AM      PATIENT REFERRED TO:  Dentist    Schedule an appointment as soon as possible for a visit   Follow up      DISCHARGE MEDICATIONS:  Discharge Medication List as of 12/9/2020 12:31 AM      START taking these medications    Details   penicillin v potassium (VEETID) 500 MG tablet Take 1 tablet by mouth 4 times daily for 7 days, Disp-28 tablet,R-0Print      benzocaine (ORAJEL) 10 % mucosal gel Take 1 g by mouth 2 times daily as needed for Pain Apply to tooth as needed., Mouth/Throat, 2 TIMES DAILY PRN Starting Wed 12/9/2020, Disp-1 Tube,R-0, Print             Jose Juan Arias DO  Emergency Medicine Resident    (Please note that portions of this note were completed with a voice recognition program.Efforts were made to edit the dictations but occasionally words are mis-transcribed.)        Stewart Royal DO  Resident  12/09/20 2594

## 2020-12-09 NOTE — ED PROVIDER NOTES
[address] [facility]     Emergency Department     Faculty Attestation    I performed a history and physical examination of the patient and discussed management with the resident. I have reviewed and agree with the residents findings including all diagnostic interpretations, and treatment plans as written. Any areas of disagreement are noted on the chart. I was personally present for the key portions of any procedures. I have documented in the chart those procedures where I was not present during the key portions. I have reviewed the emergency nurses triage note. I agree with the chief complaint, past medical history, past surgical history, allergies, medications, social and family history as documented unless otherwise noted below. Documentation of the HPI, Physical Exam and Medical Decision Making performed by scribjoés miguel is based on my personal performance of the HPI, PE and MDM. For Physician Assistant/ Nurse Practitioner cases/documentation I have personally evaluated this patient and have completed at least one if not all key elements of the E/M (history, physical exam, and MDM). Additional findings are as noted. 36 yo F r upper dental pain, no fever, no vomit, no injury   pe gcs 15, Nanda RN escort for exam, rubi r upper molar, enamel defect, no dental abscess,     abx , dental referral    EKG Interpretation    Interpreted by me      CRITICAL CARE: There was a high probability of clinically significant/life threatening deterioration in this patient's condition which required my urgent intervention. Total critical care time was 0 minutes. This excludes any time for separately reportable procedures.        Yahir [address]  12/09/20 7845

## 2021-01-21 ENCOUNTER — VIRTUAL VISIT (OUTPATIENT)
Dept: INTERNAL MEDICINE | Age: 31
End: 2021-01-21
Payer: MEDICARE

## 2021-01-21 DIAGNOSIS — G47.33 OBSTRUCTIVE SLEEP APNEA OF ADULT: ICD-10-CM

## 2021-01-21 DIAGNOSIS — I10 CHRONIC HYPERTENSION: ICD-10-CM

## 2021-01-21 PROCEDURE — 99213 OFFICE O/P EST LOW 20 MIN: CPT | Performed by: STUDENT IN AN ORGANIZED HEALTH CARE EDUCATION/TRAINING PROGRAM

## 2021-01-21 RX ORDER — BLOOD PRESSURE TEST KIT
KIT MISCELLANEOUS
Qty: 1 KIT | Refills: 0 | Status: SHIPPED | OUTPATIENT
Start: 2021-01-21 | End: 2021-01-21 | Stop reason: SDUPTHER

## 2021-01-21 RX ORDER — HYDROXYZINE PAMOATE 50 MG/1
50 CAPSULE ORAL 3 TIMES DAILY PRN
COMMUNITY
Start: 2021-01-20

## 2021-01-21 SDOH — ECONOMIC STABILITY: FOOD INSECURITY: WITHIN THE PAST 12 MONTHS, YOU WORRIED THAT YOUR FOOD WOULD RUN OUT BEFORE YOU GOT MONEY TO BUY MORE.: NEVER TRUE

## 2021-01-21 SDOH — ECONOMIC STABILITY: FOOD INSECURITY: WITHIN THE PAST 12 MONTHS, THE FOOD YOU BOUGHT JUST DIDN'T LAST AND YOU DIDN'T HAVE MONEY TO GET MORE.: NEVER TRUE

## 2021-01-21 SDOH — ECONOMIC STABILITY: INCOME INSECURITY: HOW HARD IS IT FOR YOU TO PAY FOR THE VERY BASICS LIKE FOOD, HOUSING, MEDICAL CARE, AND HEATING?: NOT HARD AT ALL

## 2021-01-21 SDOH — ECONOMIC STABILITY: TRANSPORTATION INSECURITY
IN THE PAST 12 MONTHS, HAS THE LACK OF TRANSPORTATION KEPT YOU FROM MEDICAL APPOINTMENTS OR FROM GETTING MEDICATIONS?: YES

## 2021-01-21 SDOH — ECONOMIC STABILITY: TRANSPORTATION INSECURITY
IN THE PAST 12 MONTHS, HAS LACK OF TRANSPORTATION KEPT YOU FROM MEETINGS, WORK, OR FROM GETTING THINGS NEEDED FOR DAILY LIVING?: YES

## 2021-01-21 ASSESSMENT — ENCOUNTER SYMPTOMS
SHORTNESS OF BREATH: 0
COUGH: 0
ABDOMINAL PAIN: 0
BLOOD IN STOOL: 0
DIARRHEA: 0
NAUSEA: 0
CONSTIPATION: 0
SINUS PAIN: 0
SORE THROAT: 0
WHEEZING: 0
CHEST TIGHTNESS: 0
RHINORRHEA: 0
SINUS PRESSURE: 0

## 2021-01-21 NOTE — PROGRESS NOTES
Patient initiated a doxy visit to get prescription filled for CPAP supplies. She is morbidly obese. She has SHOBHA. She has not been compliant with her medications. She has hypertension and prediabetes. She has not followed up with bariatric clinic. She has bipolar disorder. We will have her follow-up in the office for blood pressure check and to reinitiate amlodipine as needed. Attending Physician Statement  I have discussed the care of Pako Pelaez, including pertinent history and exam findings,  with the resident. I have reviewed the key elements of all parts of the encounter with the resident. I agree with the assessment, plan and orders as documented by the resident.   (GE Modifier)

## 2021-01-21 NOTE — PROGRESS NOTES
2021    TELEHEALTH EVALUATION -- Audio/Visual (During NQJYR-27 public health emergency)    HPI:    Haseeb Coulter (:  1990) has requested an audio/video evaluation for the following concern(s):    Needing CPAP supplies. The patient has a history of sleep apnea since 2018. She has been using a CPAP since then. She still has symptoms of snoring, nighttime awakening and choking with daytime fatigue/sleepiness when she does not use the machine. She needs new supplies and would need a new form signed. Hypertension  - unsure what her blood pressure reads at home. Will order her a blood pressure cuff. She was to be on Amlodipine, however had not refilled it since 2019. Obesity  - she is aware she needs to make lifestyle changes. She is planning on joining a gym. She, however, would like help with her diet. Review of Systems   Constitutional: Negative for activity change and fever. HENT: Negative for rhinorrhea, sinus pressure, sinus pain and sore throat. Eyes: Negative for visual disturbance. Respiratory: Negative for cough, chest tightness, shortness of breath and wheezing. Cardiovascular: Negative for chest pain, palpitations and leg swelling. Gastrointestinal: Negative for abdominal pain, blood in stool, constipation, diarrhea and nausea. Genitourinary: Negative for dysuria and hematuria. Neurological: Negative for dizziness, light-headedness and headaches. Prior to Visit Medications    Medication Sig Taking?  Authorizing Provider   hydrOXYzine (VISTARIL) 50 MG capsule Take 50 mg by mouth 3 times daily as needed Yes Historical Provider, MD   Blood Pressure KIT Check blood pressure in the morning and evening Yes Ayaka Eid MD   acetaminophen (TYLENOL) 500 MG tablet Take 1 tablet by mouth 3 times daily as needed for Pain Yes Violette Montiel DO benzocaine (ORAJEL) 10 % mucosal gel Take 1 g by mouth 2 times daily as needed for Pain Apply to tooth as needed. Yes Harjinder Chandler, DO   Misc. Devices MISC 1 PAIR OF DIABETIC SHOES (1 LEFT/ 1 RIGHT)  1 -3 PAIRS OF INSERTS (LEFT/ RIGHT) Yes Nikia Roca DPM   hydrocortisone valerate (WESTCORT) 0.2 % ointment Apply topically daily. Yes Jackie Estrada DPM   clotrimazole-betamethasone (LOTRISONE) 1-0.05 % cream Apply topically 2 times daily.  Yes Jackie Estrada DPM   ibuprofen (ADVIL;MOTRIN) 800 MG tablet Take 1 tablet by mouth every 8 hours as needed for Pain Yes Christa Thurston MD   fluticasone (FLONASE) 50 MCG/ACT nasal spray instill 1 spray into each nostril twice a day Yes Aakash Rascon MD   albuterol sulfate HFA (VENTOLIN HFA) 108 (90 Base) MCG/ACT inhaler Inhale 2 puffs into the lungs every 6 hours as needed for Wheezing Yes Aakash Rascon MD   diphenhydrAMINE (BENADRYL) 25 MG capsule Take 1 capsule by mouth every 4 hours as needed for Itching Yes Aakash Rascon MD   amLODIPine (NORVASC) 5 MG tablet Take 1 tablet by mouth daily Yes Aakash Rascon MD   FLUoxetine (PROZAC) 40 MG capsule Take 1 capsule by mouth daily Yes Abdirizak Esparza MD   ziprasidone (GEODON) 40 MG capsule Take 1 capsule by mouth 2 times daily (with meals) Yes Abdirizak Esparza MD   traZODone (DESYREL) 50 MG tablet Take 1 tablet by mouth nightly as needed for Sleep Yes Abdirizak Esparza MD       Social History     Tobacco Use    Smoking status: Current Every Day Smoker     Packs/day: 1.00     Years: 10.00     Pack years: 10.00     Types: Cigarettes    Smokeless tobacco: Never Used   Substance Use Topics    Alcohol use: Yes     Comment: socially    Drug use: No        Allergies   Allergen Reactions    Latex      Rash     Fruit & Vegetable Daily [Nutritional Supplements]      Cataloupe,honeydew,grapes,watermelon,bananas,apples,pineapples    Seasonal    ,   Past Medical History:   Diagnosis Date    Asthma     Has not needed inhaler since 2009 Due to the current efforts to prevent transmission of COVID-19 and also the need to preserve PPE for other caregivers, a face-to-face encounter with the patient was not performed. That being said, all relevant records and diagnostic tests were reviewed, including laboratory results and imaging. Please reference any relevant documentation elsewhere. Care will be coordinated with the primary service. ASSESSMENT/PLAN:  1. BMI 50.0-59.9, adult [facility])    - [address]    2. Obstructive sleep apnea of adult  - will send forms for her supplies    3. Chronic hypertension  - follow up in 1 week  - Blood Pressure KIT; Check blood pressure in the morning and evening  Dispense: 1 kit; Refill: 0      Return in about 1 week (around 1/28/2021) for Hypertension follow-up, obesity. Enoch Montes De Oca is a 35 y.o. female being evaluated by a Virtual Visit (video visit) encounter to address concerns as mentioned above. A caregiver was present when appropriate. Due to this being a TeleHealth encounter (During UAKSA-30 public health emergency), evaluation of the following organ systems was limited: Vitals/Constitutional/EENT/Resp/CV/GI//MS/Neuro/Skin/Heme-Lymph-Imm. Pursuant to the emergency declaration under the [address] [place] [address], [address] [place] [address] authority and the [employer] Act, this Virtual Visit was conducted with patient's (and/or legal guardian's) consent, to reduce the patient's risk of exposure to COVID-19 and provide necessary medical care. The patient (and/or legal guardian) has also been advised to contact this office for worsening conditions or problems, and seek emergency medical treatment and/or call 911 if deemed necessary.      Patient identification was verified at the start of the visit: Yes    Total time spent on this encounter: 11-20 Services were provided through a video synchronous discussion virtually to substitute for in-person clinic visit. Patient and provider were located at their individual homes. --Aneta Dubin, MD on 1/21/2021 at 3:36 PM    An electronic signature was used to authenticate this note.        Aneta Dubin, MD  PGY-3, Internal medicine resident  [place], [place]  1/21/2021 3:36 PM

## 2021-01-21 NOTE — PATIENT INSTRUCTIONS
Return To Clinic 1/28/2021 for 1 week follow up in office. The medication list included in this document is our record of what you are currently taking, including any changes that were made at today's visit. If you find any differences when compared to your medications at home, or have any questions that were not answered at your visit, please contact the office. It is very important for your care that you keep your appointment. If for some reason you are unable to keep your appointment, it is equally important that you call our office at [phone] to cancel your appointment and reschedule. Failure to do so may result in your termination from our practice. After Visit Summary mailed to the patient along with appointment card and all other paperwork/orders. [facility] Nutrition Services [facility]. Specialty office will contact patient for appointment. A copy of referral with contact information and address given to patient.  Patient should contact specialist office if no contact has been made to patient within 1 week.     BRITTANY Moreno

## 2021-03-10 ENCOUNTER — TELEPHONE (OUTPATIENT)
Dept: INTERNAL MEDICINE | Age: 31
End: 2021-03-10

## 2021-04-30 ENCOUNTER — HOSPITAL ENCOUNTER (EMERGENCY)
Age: 31
Discharge: HOME | End: 2021-04-30
Attending: EMERGENCY MEDICINE
Payer: MEDICARE

## 2021-04-30 VITALS
SYSTOLIC BLOOD PRESSURE: 142 MMHG | OXYGEN SATURATION: 100 % | RESPIRATION RATE: 18 BRPM | TEMPERATURE: 97.2 F | HEART RATE: 93 BPM | DIASTOLIC BLOOD PRESSURE: 99 MMHG

## 2021-04-30 DIAGNOSIS — J06.9 UPPER RESPIRATORY TRACT INFECTION, UNSPECIFIED TYPE: Primary | ICD-10-CM

## 2021-04-30 DIAGNOSIS — M62.830 BACK SPASM: ICD-10-CM

## 2021-04-30 PROCEDURE — 6360000002 HC RX W HCPCS: Performed by: GENERAL PRACTICE

## 2021-04-30 PROCEDURE — 99283 EMERGENCY DEPT VISIT LOW MDM: CPT

## 2021-04-30 PROCEDURE — 96372 THER/PROPH/DIAG INJ SC/IM: CPT

## 2021-04-30 RX ORDER — CYCLOBENZAPRINE HCL 10 MG
10 TABLET ORAL 3 TIMES DAILY PRN
Qty: 30 TABLET | Refills: 0 | Status: SHIPPED | OUTPATIENT
Start: 2021-04-30 | End: 2021-05-10

## 2021-04-30 RX ORDER — ORPHENADRINE CITRATE 30 MG/ML
60 INJECTION INTRAMUSCULAR; INTRAVENOUS ONCE
Status: COMPLETED | OUTPATIENT
Start: 2021-04-30 | End: 2021-04-30

## 2021-04-30 RX ORDER — LORATADINE 10 MG/1
10 TABLET ORAL DAILY
Qty: 30 TABLET | Refills: 0 | Status: SHIPPED | OUTPATIENT
Start: 2021-04-30 | End: 2021-05-30

## 2021-04-30 RX ORDER — FLUTICASONE PROPIONATE 50 MCG
1 SPRAY, SUSPENSION (ML) NASAL DAILY
Qty: 1 BOTTLE | Refills: 0 | Status: ON HOLD | OUTPATIENT
Start: 2021-04-30 | End: 2022-05-14

## 2021-04-30 RX ADMIN — ORPHENADRINE CITRATE 60 MG: 30 INJECTION INTRAMUSCULAR; INTRAVENOUS at 17:47

## 2021-04-30 ASSESSMENT — ENCOUNTER SYMPTOMS
RHINORRHEA: 1
SINUS PRESSURE: 0
ABDOMINAL PAIN: 0
NAUSEA: 0
SINUS PAIN: 0
VOICE CHANGE: 0
SORE THROAT: 1
VOMITING: 0
TROUBLE SWALLOWING: 0
COUGH: 0
FACIAL SWELLING: 0
SHORTNESS OF BREATH: 0
BACK PAIN: 1

## 2021-04-30 NOTE — ED PROVIDER NOTES
Tobacco Use    Smoking status: Current Every Day Smoker     Packs/day: 1.00     Years: 10.00     Pack years: 10.00     Types: Cigarettes    Smokeless tobacco: Never Used   Substance and Sexual Activity    Alcohol use: Yes     Comment: socially    Drug use: No    Sexual activity: Not Currently     Partners: Female, Male   Lifestyle    Physical activity     Days per week: Not on file     Minutes per session: Not on file    Stress: Not on file   Relationships    Social connections     Talks on phone: Not on file     Gets together: Not on file     Attends Cheondo[employer] service: Not on file     Active member of club or organization: Not on file     Attends meetings of clubs or organizations: Not on file     Relationship status: Not on file    Intimate partner violence     Fear of current or ex partner: Not on file     Emotionally abused: Not on file     Physically abused: Not on file     Forced sexual activity: Not on file   Other Topics Concern    Not on file   Social History Narrative    Not on file       Family History   Problem Relation Age of Onset    High Blood Pressure Mother     Diabetes Maternal Grandfather     High Blood Pressure Maternal Grandfather     Schizophrenia Maternal Aunt     Bipolar Disorder Maternal Aunt     Schizophrenia Maternal Aunt     Colon Cancer Neg Hx     Breast Cancer Neg Hx     Uterine Cancer Neg Hx     Ovarian Cancer Neg Hx        Allergies:  Latex, Fruit & vegetable daily [nutritional supplements], and Seasonal    Home Medications:  Prior to Admission medications    Medication Sig Start Date End Date Taking?  Authorizing Provider   fluticasone (FLONASE) 50 MCG/ACT nasal spray 1 spray by Each Nostril route daily 4/30/21  Yes Elias Trinidad, DO   menthol-cetylpyridinium (CEPACOL REGULAR STRENGTH) 3 MG lozenge Take 1 lozenge by mouth as needed for Sore Throat 4/30/21  Yes Elias Bhatiaps, DO   loratadine (CLARITIN) 10 MG tablet Take 1 tablet by mouth daily 4/30/21 5/30/21 Yes Earnest Pulling, DO   cyclobenzaprine (FLEXERIL) 10 MG tablet Take 1 tablet by mouth 3 times daily as needed for Muscle spasms 4/30/21 5/10/21 Yes Earnest Pulling, DO   hydrOXYzine (VISTARIL) 50 MG capsule Take 50 mg by mouth 3 times daily as needed 1/20/21   Historical Provider, MD   acetaminophen (TYLENOL) 500 MG tablet Take 1 tablet by mouth 3 times daily as needed for Pain 12/9/20   Jaison Gomez,    benzocaine (ORAJEL) 10 % mucosal gel Take 1 g by mouth 2 times daily as needed for Pain Apply to tooth as needed. 12/9/20   Jaison Gomez, DO   Misc. Devices MISC 1 PAIR OF DIABETIC SHOES (1 LEFT/ 1 RIGHT)  1 -3 PAIRS OF INSERTS (LEFT/ RIGHT) 11/13/20   Piedad Melena, DPM   hydrocortisone valerate (WESTCORT) 0.2 % ointment Apply topically daily. 11/13/20   Piedad Melena, DPM   clotrimazole-betamethasone (LOTRISONE) 1-0.05 % cream Apply topically 2 times daily. 11/13/20   Piedad Melena, DPM   ibuprofen (ADVIL;MOTRIN) 800 MG tablet Take 1 tablet by mouth every 8 hours as needed for Pain 11/2/20   Cristal Thurston MD   albuterol sulfate HFA (VENTOLIN HFA) 108 (90 Base) MCG/ACT inhaler Inhale 2 puffs into the lungs every 6 hours as needed for Wheezing 8/11/20   Yee Nogueira MD   diphenhydrAMINE (BENADRYL) 25 MG capsule Take 1 capsule by mouth every 4 hours as needed for Itching 8/11/20   Yee Nogueira MD   amLODIPine (NORVASC) 5 MG tablet Take 1 tablet by mouth daily 8/11/20   Yee Nogueira MD   FLUoxetine (PROZAC) 40 MG capsule Take 1 capsule by mouth daily 1/8/17   Xu Rosales MD   ziprasidone (GEODON) 40 MG capsule Take 1 capsule by mouth 2 times daily (with meals) 1/8/17   Xu Rosales MD   traZODone (DESYREL) 50 MG tablet Take 1 tablet by mouth nightly as needed for Sleep 1/8/17   Xu Rosales MD       REVIEW OF SYSTEMS    (2-9 systems for level 4, 10 or more for level 5)      Review of Systems   Constitutional: Negative for activity change, appetite change, chills and fever.    HENT: Positive for congestion, rhinorrhea and sore throat. Negative for drooling, ear discharge, ear pain, facial swelling, hearing loss, sinus pressure, sinus pain, trouble swallowing and voice change. Respiratory: Negative for cough and shortness of breath. Cardiovascular: Negative for chest pain. Gastrointestinal: Negative for abdominal pain, nausea and vomiting. Musculoskeletal: Positive for back pain. Negative for gait problem. Skin: Negative for rash and wound. Neurological: Negative for light-headedness and headaches. Psychiatric/Behavioral: Negative for agitation and confusion. PHYSICAL EXAM   (up to 7 for level 4, 8 or more for level 5)      INITIAL VITALS:   BP (!) 142/99   Pulse 93   Temp 97.2 °F (36.2 °C) (Infrared)   Resp 18   SpO2 100%     Physical Exam  Constitutional:       General: She is not in acute distress. Appearance: She is obese. She is not ill-appearing, toxic-appearing or diaphoretic. HENT:      Head: Normocephalic and atraumatic. Nose: Congestion present. Mouth/Throat:      Mouth: Mucous membranes are moist.      Pharynx: No oropharyngeal exudate or posterior oropharyngeal erythema. Comments: Patient has postnasal drip tracts noted, throat is not red no exudates no erythema  Eyes:      Extraocular Movements: Extraocular movements intact. Pupils: Pupils are equal, round, and reactive to light. Cardiovascular:      Rate and Rhythm: Normal rate. Pulmonary:      Comments: Breathing comfortable room air, symmetric chest rise, speaking in full sentences, no evidence of respiratory distress  Musculoskeletal:      Comments: Tenderness in the paraspinal musculature of the lower lumbar region   Skin:     Capillary Refill: Capillary refill takes less than 2 seconds. Neurological:      General: No focal deficit present. Mental Status: She is alert and oriented to person, place, and time. Cranial Nerves: No cranial nerve deficit.       Gait: Gait normal. Psychiatric:         Mood and Affect: Mood normal.         Behavior: Behavior normal.         Thought Content: Thought content normal.         Judgment: Judgment normal.         DIFFERENTIAL  DIAGNOSIS     PLAN (LABS / IMAGING / EKG):  No orders of the defined types were placed in this encounter. MEDICATIONS ORDERED:  Orders Placed This Encounter   Medications    orphenadrine (NORFLEX) injection 60 mg    fluticasone (FLONASE) 50 MCG/ACT nasal spray     Si spray by Each Nostril route daily     Dispense:  1 Bottle     Refill:  0    menthol-cetylpyridinium (CEPACOL REGULAR STRENGTH) 3 MG lozenge     Sig: Take 1 lozenge by mouth as needed for Sore Throat     Dispense:  9 lozenge     Refill:  0    loratadine (CLARITIN) 10 MG tablet     Sig: Take 1 tablet by mouth daily     Dispense:  30 tablet     Refill:  0    cyclobenzaprine (FLEXERIL) 10 MG tablet     Sig: Take 1 tablet by mouth 3 times daily as needed for Muscle spasms     Dispense:  30 tablet     Refill:  0       DDX: Muscle strain, sprain, URI, low suspicion for strep pharyngitis, Covid, low suspicion for cauda equina    DIAGNOSTIC RESULTS / EMERGENCY DEPARTMENT COURSE / MDM   :  No results found for this visit on 21. RADIOLOGY:  None    EKG  None    All EKG's are interpreted by the Emergency Department Physician who either signs or Co-signs this chart in the absence of a cardiologist.    EMERGENCY DEPARTMENT COURSE/IMPRESSION: 71-year-old female with history of chronic low back pain and muscle spasms. Patient has no red flag symptoms on exam, no clinical indication for imaging. We will plan to give Norflex, educated on stretching, heat, prescribed Flexeril. Educated patient on symptomatic treatment for viral URI, no clinical Acacian for antibiotics. Flonase, antihistamine, Cepacol. Educated on follow-up with primary care provider and additional return precautions.       PROCEDURES:  None    CONSULTS:  None    CRITICAL

## 2021-04-30 NOTE — ED PROVIDER NOTES
[facility]     Emergency Department     Faculty Attestation        I performed a history and physical examination of the patient and discussed management with the resident. I reviewed the residents note and agree with the documented findings and plan of care. Any areas of disagreement are noted on the chart. I was personally present for the key portions of any procedures. I have documented in the chart those procedures where I was not present during the key portions. I have reviewed the emergency nurses triage note. I agree with the chief complaint, past medical history, past surgical history, allergies, medications, social and family history as documented unless otherwise noted below. For mid-level providers such as nurse practitioners as well as physicians assistants:    I have personally seen and evaluated the patient. I find the patient's history and physical exam are consistent with NP/PA documentation. I agree with the care provided, treatment rendered, disposition, & follow-up plan. Additional findings are as noted. Vital Signs: BP (!) 142/99   Pulse 93   Temp 97.2 °F (36.2 °C) (Infrared)   Resp 18   SpO2 100%   PCP:  Mulugeta Williamson MD    Pertinent Comments:     Patient presents with acute on chronic back pain. She states he has been working long hours with standing her feet she has paraspinal tenderness in lumbar spine but no midline tenderness she denies any bowel or bladder incontinence falls, anticoagulation use, IV drug abuse. She is able walk and ambulate with no assistance.   Also complains of sore throat some nasal congestion but otherwise afebrile nontoxic      Critical Care  None          Francisco Albert MD   Attending Emergency Medicine Physician              Huyen Loredo MD  04/30/21 0365

## 2021-07-23 NOTE — PATIENT INSTRUCTIONS
Advance Directives: Care Instructions  Overview  An advance directive is a legal way to state your wishes at the end of your life. It tells your family and your doctor what to do if you can't say what you want. There are two main types of advance directives. You can change them any time your wishes change. Living will. This form tells your family and your doctor your wishes about life support and other treatment. The form is also called a declaration. Medical power of . This form lets you name a person to make treatment decisions for you when you can't speak for yourself. This person is called a health care agent (health care proxy, health care surrogate). The form is also called a durable power of  for health care. If you do not have an advance directive, decisions about your medical care may be made by a family member, or by a doctor or a  who doesn't know you. It may help to think of an advance directive as a gift to the people who care for you. If you have one, they won't have to make tough decisions by themselves. Follow-up care is a key part of your treatment and safety. Be sure to make and go to all appointments, and call your doctor if you are having problems. It's also a good idea to know your test results and keep a list of the medicines you take. What should you include in an advance directive? Many states have a unique advance directive form. (It may ask you to address specific issues.) Or you might use a universal form that's approved by many states. If your form doesn't tell you what to address, it may be hard to know what to include in your advance directive. Use the questions below to help you get started. · Who do you want to make decisions about your medical care if you are not able to? · What life-support measures do you want if you have a serious illness that gets worse over time or can't be cured? · What are you most afraid of that might happen? (Maybe you're afraid of having pain, losing your independence, or being kept alive by machines.)  · Where would you prefer to die? (Your home? A hospital? A nursing home?)  · Do you want to donate your organs when you die? · Do you want certain Tenriism practices performed before you die? When should you call for help? Be sure to contact your doctor if you have any questions. Where can you learn more? Go to [email or web address] and sign in to your Coinify account. Enter R264 in the [facility] box to learn more about \"Advance Directives: Care Instructions. \"     If you do not have an account, please click on the \"Sign Up Now\" link. Current as of: December 9, 2019               Content Version: 12.5  © 1826-0369 Healthwise, Incorporated. Care instructions adapted under license by [facility] ([facility]). If you have questions about a medical condition or this instruction, always ask your healthcare professional. [place] [address] any warranty or liability for your use of this information. Learning About Medical Power of   What is a medical power of ? A medical power of , also called a durable power of  for health care, is one type of the legal forms called advance directives. It lets you name the person you want to make treatment decisions for you if you can't speak or decide for yourself. The person you choose is called your health care agent. This person is also called a health care proxy or health care surrogate. A medical power of  may be called something else in your state. How do you choose a health care agent? Choose your health care agent carefully. This person may or may not be a family member. Talk to the person before you make your final decision. Make sure he or she is comfortable with this responsibility. It's a good idea to choose someone who:  · Is at least 25years old.   · Knows you well and understands what makes life meaningful for you. · Understands your Congregational and moral values. · Will do what you want, not what he or she wants. · Will be able to make difficult choices at a stressful time. · Will be able to refuse or stop treatment, if that is what you would want, even if you could die. · Will be firm and confident with health professionals if needed. · Will ask questions to get needed information. · Lives near you or agrees to travel to you if needed. Your family may help you make medical decisions while you can still be part of that process. But it's important to choose one person to be your health care agent in case you aren't able to make decisions for yourself. If you don't fill out the legal form and name a health care agent, the decisions your family can make may be limited. A health care agent may be called something else in your state. Who will make decisions for you if you don't have a health care agent? If you don't have a health care agent or a living will, you may not get the care you want. Decisions may be made by family members who disagree about your medical care. Or decisions may be made by a medical professional who doesn't know you well. In some cases, a  makes the decisions. When you name a health care agent, it is very clear who has the power to make health decisions for you. How do you name a health care agent? You name your health care agent on a legal form. This form is usually called a medical power of . Ask your hospital, state bar association, or office on aging where to find these forms. You must sign the form to make it legal. Some states require you to get the form notarized. This means that a person called a  watches you sign the form and then he or she signs the form. Some states also require that two or more witnesses sign the form. Be sure to tell your family members and doctors who your health care agent is.   Where can you learn more? Go to [email or web address]://[email or web address] and sign in to your ShareRoot account. Enter [id number] in the [facility] box to learn more about \"Learning About [address]. \"     If you do not have an account, please click on the \"Sign Up Now\" link. Current as of: December 9, 2019               Content Version: 12.5  © 7934-2129 [employer]. Care instructions adapted under license by [facility] ([facility]). If you have questions about a medical condition or this instruction, always ask your healthcare professional. [address] any warranty or liability for your use of this information. Learning About Living [place]  What is a living will? A living will, also called a declaration, is a legal form. It tells your family and your doctor your wishes when you can't speak for yourself. It's used by the health professionals who will treat you as you near the end of your life or if you get seriously hurt or ill. If you put your wishes in writing, your loved ones and others will know what kind of care you want. They won't need to guess. This can ease your mind and be helpful to others. And you can change or cancel your living will at any time. A living will is not the same as an estate or property will. An estate will explains what you want to happen with your money and property after you die. How do you use it? A living will is used to describe the kinds of treatment or life support you want as you near the end of your life or if you get seriously hurt or ill. Keep these facts in mind about living vance. · Your living will is used only if you can't speak or make decisions for yourself. Most often, one or more doctors must certify that you can't speak or decide for yourself before your living will takes effect. · If you get better and can speak for yourself again, you can accept or refuse any treatment.  It doesn't matter what you said in your living will. · Some states may limit your right to refuse treatment in certain cases. For example, you may need to clearly state in your living will that you don't want artificial hydration and nutrition, such as being fed through a tube. Is a living will a legal document? A living will is a legal document. Each state has its own laws about living vance. And a living will may be called something else in your state. Here are some things to know about living vance. · You don't need an  to complete a living will. But legal advice can be helpful if your state's laws are unclear. It can also help if your health history is complicated or your family can't agree on what should be in your living will. · You can change your living will at any time. Some people find that their wishes about end-of-life care change as their health changes. If you make big changes to your living will, complete a new form. · If you move to another state, make sure that your living will is legal in the state where you now live. In most cases, doctors will respect your wishes even if you have a form from a different state. · You might use a universal form that has been approved by many states. This kind of form can sometimes be filled out and stored online. Your digital copy will then be available wherever you have a connection to the internet. The doctors and nurses who need to treat you can find it right away. · Your state may offer an online registry. This is another place where you can store your living will online. · It's a good idea to get your living will notarized. This means using a person called a  to watch two people sign, or witness, your living will. What should you know when you create a living will? Here are some questions to ask yourself as you make your living will:  · Do you know enough about life support methods that might be used?  If not, talk to your doctor so you know what might be done if you can't breathe on your own, your heart stops, or you can't swallow. · What things would you still want to be able to do after you receive life-support methods? Would you want to be able to walk? To speak? To eat on your own? To live without the help of machines? · Do you want certain Rastafari practices performed if you become very ill? · If you have a choice, where do you want to be cared for? In your home? At a hospital or nursing home? · If you have a choice at the end of your life, where would you prefer to die? At home? In a hospital or nursing home? Somewhere else? · Would you prefer to be buried or cremated? · Do you want your organs to be donated after you die? What should you do with your living will? · Make sure that your family members and your health care agent have copies of your living will (also called a declaration). · Give your doctor a copy of your living will. Ask him or her to keep it as part of your medical record. If you have more than one doctor, make sure that each one has a copy. · Put a copy of your living will where it can be easily found. For example, some people may put a copy on their refrigerator door. If you are using a digital copy, be sure your doctor, family members, and health care agent know how to find and access it. Where can you learn more? Go to [email or web address] org and sign in to your Peerio account. Enter K658 in the [facility] box to learn more about \"Learning About Living [facility]. \"     If you do not have an account, please click on the \"Sign Up Now\" link. Current as of: December 9, 2019               Content Version: 12.5  © 7593-7773 Healthwise, Incorporated. Care instructions adapted under license by [facility] ([facility]). If you have questions about a medical condition or this instruction, always ask your healthcare professional. [address] any warranty or liability for your use of this information. Body Mass Index: Care Instructions  Your Care Instructions     Body mass index (BMI) can help you see if your weight is raising your risk for health problems. It uses a formula to compare how much you weigh with how tall you are. · A BMI lower than 18.5 is considered underweight. · A BMI between 18.5 and 24.9 is considered healthy. · A BMI between 25 and 29.9 is considered overweight. A BMI of 30 or higher is considered obese. If your BMI is in the normal range, it means that you have a lower risk for weight-related health problems. If your BMI is in the overweight or obese range, you may be at increased risk for weight-related health problems, such as high blood pressure, heart disease, stroke, arthritis or joint pain, and diabetes. If your BMI is in the underweight range, you may be at increased risk for health problems such as fatigue, lower protection (immunity) against illness, muscle loss, bone loss, hair loss, and hormone problems. BMI is just one measure of your risk for weight-related health problems. You may be at higher risk for health problems if you are not active, you eat an unhealthy diet, or you drink too much alcohol or use tobacco products. Follow-up care is a key part of your treatment and safety. Be sure to make and go to all appointments, and call your doctor if you are having problems. It's also a good idea to know your test results and keep a list of the medicines you take. How can you care for yourself at home? · Practice healthy eating habits. This includes eating plenty of fruits, vegetables, whole grains, lean protein, and low-fat dairy. · If your doctor recommends it, get more exercise. Walking is a good choice. Bit by bit, increase the amount you walk every day. Try for at least 30 minutes on most days of the week. · Do not smoke. Smoking can increase your risk for health problems. If you need help quitting, talk to your doctor about stop-smoking programs and medicines.  These can increase your chances of quitting for good. · Limit alcohol to 2 drinks a day for men and 1 drink a day for women. Too much alcohol can cause health problems. If you have a BMI higher than 25  · Your doctor may do other tests to check your risk for weight-related health problems. This may include measuring the distance around your waist. A waist measurement of more than 40 inches in men or 35 inches in women can increase the risk of weight-related health problems. · Talk with your doctor about steps you can take to stay healthy or improve your health. You may need to make lifestyle changes to lose weight and stay healthy, such as changing your diet and getting regular exercise. If you have a BMI lower than 18.5  · Your doctor may do other tests to check your risk for health problems. · Talk with your doctor about steps you can take to stay healthy or improve your health. You may need to make lifestyle changes to gain or maintain weight and stay healthy, such as getting more healthy foods in your diet and doing exercises to build muscle. Where can you learn more? Go to [email or web address] [email or web address] org and sign in to your Bilneur account. Enter S176 in the [facility] box to learn more about \"Body Mass Index: Care Instructions. \"     If you do not have an account, please click on the \"Sign Up Now\" link. Current as of: December 11, 2019               Content Version: 12.5  © 0350-5311 Healthwise, Incorporated. Care instructions adapted under license by [facility] ([facility]). If you have questions about a medical condition or this instruction, always ask your healthcare professional. [place] [address] any warranty or liability for your use of this information. Learning About Healthy Weight  What is a healthy weight? A healthy weight is the weight at which you feel good about yourself and have energy for work and play.  It's also one that lowers your risk for health problems. What can you do to stay at a healthy weight? It can be hard to stay at a healthy weight, especially when fast food, vending-machine snacks, and processed foods are so easy to find. And with your busy lifestyle, activity may be low on your list of things to do. But staying at a healthy weight may be easier than you think. Here are some dos and don'ts for staying at a healthy weight:  Do eat healthy foods  The kinds of foods you eat have a big impact on both your weight and your health. Reaching and staying at a healthy weight is not about going on a diet. It's about making healthier food choices every day and changing your diet for good. Healthy eating means eating a variety of foods so that you get all the nutrients you need. Your body needs protein, carbohydrate, and fats for energy. They keep your heart beating, your brain active, and your muscles working. On most days, try to eat from each food group. This means eating a variety of:  · Whole grains, such as whole wheat breads and pastas. · Fruits and vegetables. · Dairy products, such as low-fat milk, yogurt, and cheese. · Lean proteins, such as all types of fish, chicken without the skin, and beans. Don't have too much or too little of one thing. All foods, if eaten in moderation, can be part of healthy eating. Even sweets can be okay. If your favorite foods are high in fat, salt, sugar, or calories, limit how often you eat them. Eat smaller servings, or look for healthy substitutes. Do watch what you eat  Many people eat more than their bodies need. Part of staying at a healthy weight means learning how much food you really need from day to day and not eating more than that. Even with healthy foods, eating too much can make you gain weight. Having a well-balanced diet means that you eat enough, but not too much, and that your food gives you the nutrients you need to stay healthy. So listen to your body. Eat when you're hungry.  Stop when you feel satisfied. It's a good idea to have healthy snacks ready for when you get hungry. Keep healthy snacks with you at work, in your car, and at home. If you have a healthy snack easily available, you'll be less likely to pick a candy bar or bag of chips from a vending machine instead. Some healthy snacks you might want to keep on hand are fruit, low-fat yogurt, string cheese, low-fat microwave popcorn, raisins and other dried fruit, nuts, whole wheat crackers, pretzels, carrots, celery sticks, and broccoli. Do some physical activity   A big part of reaching and staying at a healthy weight is being active. When you're active, you burn calories. This makes it easier to reach and stay at a healthy weight. When you're active on a regular basis, your body burns more calories, even when you're at rest. Being active helps you lose fat and build lean muscle. Try to be active for at least 1 hour every day. This may sound like a lot, but it's okay to be active in smaller blocks of time that add up to 1 hour a day. Any activity that makes your heart beat faster and keeps it there for a while counts. A brisk walk, run, or swim will get your heart beating faster. So will climbing stairs, shooting baskets, or cycling. Even some household chores like vacuuming and mowing the lawn will get your heart rate up. Pick activities that you enjoy--ones that make your heart beat faster, your muscles stronger, and your muscles and joints more flexible. If you find more than one thing you like doing, do them all. You don't have to do the same thing every day. Don't diet  Diets don't work. Diets are temporary. Because you give up so much when you diet, you may be hungry and think about food all the time. And after you stop dieting, you also may overeat to make up for what you missed. Most people who diet end up gaining back the pounds they lost--and more. Remember that healthy bodies come in lots of shapes and sizes.  Everyone can get healthier by eating better and being more active. Where can you learn more? Go to [email or web address].[email or web address] [email or web address] and sign in to your Storelift account. Enter [id number] in the [facility] box to learn more about \"Learning About Healthy Weight. \"     If you do not have an account, please click on the \"Sign Up Now\" link. Current as of: December 11, 2019               Content Version: 12.5  © 9804-6418 Healthwise, Incorporated. Care instructions adapted under license by [facility] ([facility]). If you have questions about a medical condition or this instruction, always ask your healthcare professional. [address] any warranty or liability for your use of this information. Learning About Low-Carbohydrate Diets  What is a low-carbohydrate diet? A low-carbohydrate (or \"low-carb\") diet limits foods and drinks that have carbohydrates. This includes grains, fruits, milk and yogurt, and starchy vegetables like potatoes, beans, and corn. It also avoids foods and drinks that have added sugar. Instead, low-carb diets include foods that are high in protein and fat. Why might you follow a low-carb diet? Low-carb diets may be used for a variety of reasons, such as for weight loss. People who have diabetes may use a low-carb diet to help manage their blood sugar levels. What should you do before you start the diet? Talk to your doctor before you try any diet. This is even more important if you have health problems like kidney disease, heart disease, or diabetes. Your doctor may suggest that you meet with a registered dietitian. A dietitian can help you make an eating plan that works for you. What foods do you eat on a low-carb diet? On a low-carb diet, you choose foods that are high in protein and fat. Examples of these are:  · Meat, poultry, and fish. · Eggs. · Nuts, such as walnuts, pecans, almonds, and peanuts.   · Peanut butter and other nut butters. · Tofu. · Avocado. · Lissette Jose De Jesus. · Non-starchy vegetables like broccoli, cauliflower, green beans, mushrooms, peppers, lettuce, and spinach. · Unsweetened non-dairy milks like almond milk and coconut milk. · Cheese, cottage cheese, and cream cheese. Current as of: August 22, 2019               Content Version: 12.5  © 2006-2020 [employer]O. Care instructions adapted under license by [facility] ([facility]). If you have questions about a medical condition or this instruction, always ask your healthcare professional. [address] any warranty or liability for your use of this information. Eating Healthy Foods: Care Instructions  Your Care Instructions     Eating healthy foods can help lower your risk for disease. Healthy food gives you energy and keeps your heart strong, your brain active, your muscles working, and your bones strong. A healthy diet includes a variety of foods from the basic food groups: grains, vegetables, fruits, milk and milk products, and meat and beans. Some people may eat more of their favorite foods from only one food group and, as a result, miss getting the nutrients they need. So, it is important to pay attention not only to what you eat but also to what you are missing from your diet. You can eat a healthy, balanced diet by making a few small changes. Follow-up care is a key part of your treatment and safety. Be sure to make and go to all appointments, and call your doctor if you are having problems. It's also a good idea to know your test results and keep a list of the medicines you take. How can you care for yourself at home? Look at what you eat  · Keep a food diary for a week or two and record everything you eat or drink. Track the number of servings you eat from each food group. · For a balanced diet every day, eat a variety of:  ? 6 or more ounce-equivalents of grains, such as cereals, breads, crackers, rice, or pasta, every day.  An ounce-equivalent is 1 slice of bread, 1 cup of ready-to-eat cereal, or ½ cup of cooked rice, cooked pasta, or cooked cereal.  ? 2½ cups of vegetables, especially:  § Dark-green vegetables such as broccoli and spinach. § Orange vegetables such as carrots and sweet potatoes. § Dry beans (such as alexander and kidney beans) and peas (such as lentils). ? 2 cups of fresh, frozen, or canned fruit. A small apple or 1 banana or orange equals 1 cup. ? 3 cups of nonfat or low-fat milk, yogurt, or other milk products. ? 5½ ounces of meat and beans, such as chicken, fish, lean meat, beans, nuts, and seeds. One egg, 1 tablespoon of peanut butter, ½ ounce nuts or seeds, or ¼ cup of cooked beans equals 1 ounce of meat. · Learn how to read food labels for serving sizes and ingredients. Fast-food and convenience-food meals often contain few or no fruits or vegetables. Make sure you eat some fruits and vegetables to make the meal more nutritious. · Look at your food diary. For each food group, add up what you have eaten and then divide the total by the number of days. This will give you an idea of how much you are eating from each food group. See if you can find some ways to change your diet to make it more healthy. Start small  · Do not try to make dramatic changes to your diet all at once. You might feel that you are missing out on your favorite foods and then be more likely to fail. · Start slowly, and gradually change your habits. Try some of the following:  ? Use whole wheat bread instead of white bread. ? Use nonfat or low-fat milk instead of whole milk. ? Eat brown rice instead of white rice, and eat whole wheat pasta instead of white-flour pasta. ? Try low-fat cheeses and low-fat yogurt. ? Add more fruits and vegetables to meals and have them for snacks. ? Add lettuce, tomato, cucumber, and onion to sandwiches. ? Add fruit to yogurt and cereal.  Enjoy food  · You can still eat your favorite foods.  You just may need Instructions     Cigarette smokers crave the nicotine in cigarettes. Giving it up is much harder than simply changing a habit. Your body has to stop craving the nicotine. It is hard to quit, but you can do it. There are many tools that people use to quit smoking. You may find that combining tools works best for you. There are several steps to quitting. First you get ready to quit. Then you get support to help you. After that, you learn new skills and behaviors to become a nonsmoker. For many people, a necessary step is getting and using medicine. Your doctor will help you set up the plan that best meets your needs. You may want to attend a smoking cessation program to help you quit smoking. When you choose a program, look for one that has proven success. Ask your doctor for ideas. You will greatly increase your chances of success if you take medicine as well as get counseling or join a cessation program.  Some of the changes you feel when you first quit tobacco are uncomfortable. Your body will miss the nicotine at first, and you may feel short-tempered and grumpy. You may have trouble sleeping or concentrating. Medicine can help you deal with these symptoms. You may struggle with changing your smoking habits and rituals. The last step is the tricky one: Be prepared for the smoking urge to continue for a time. This is a lot to deal with, but keep at it. You will feel better. Follow-up care is a key part of your treatment and safety. Be sure to make and go to all appointments, and call your doctor if you are having problems. It's also a good idea to know your test results and keep a list of the medicines you take. How can you care for yourself at home? · Ask your family, friends, and coworkers for support. You have a better chance of quitting if you have help and support. · Join a support group, such as Nicotine Anonymous, for people who are trying to quit smoking.   · Consider signing up for a smoking cessation program, such as the American Lung Association's Freedom from Smoking program.  · Get text messaging support. Go to the website at www.smokefree. gov to sign up for the [facility] program.  · Set a quit date. Pick your date carefully so that it is not right in the middle of a big deadline or stressful time. Once you quit, do not even take a puff. Get rid of all ashtrays and lighters after your last cigarette. Clean your house and your clothes so that they do not smell of smoke. · Learn how to be a nonsmoker. Think about ways you can avoid those things that make you reach for a cigarette. ? Avoid situations that put you at greatest risk for smoking. For some people, it is hard to have a drink with friends without smoking. For others, they might skip a coffee break with coworkers who smoke. ? Change your daily routine. Take a different route to work or eat a meal in a different place. · Cut down on stress. Calm yourself or release tension by doing an activity you enjoy, such as reading a book, taking a hot bath, or gardening. · Talk to your doctor or pharmacist about nicotine replacement therapy, which replaces the nicotine in your body. You still get nicotine but you do not use tobacco. Nicotine replacement products help you slowly reduce the amount of nicotine you need. These products come in several forms, many of them available over-the-counter:  ? Nicotine patches  ? Nicotine gum and lozenges  ? Nicotine inhaler  · Ask your doctor about bupropion (Wellbutrin) or varenicline (Chantix), which are prescription medicines. They do not contain nicotine. They help you by reducing withdrawal symptoms, such as stress and anxiety. · Some people find hypnosis, acupuncture, and massage helpful for ending the smoking habit. · Eat a healthy diet and get regular exercise. Having healthy habits will help your body move past its craving for nicotine. · Be prepared to keep trying.  Most people are not successful the first smoking. · These medicines have less nicotine than cigarettes. And by itself, nicotine is not nearly as harmful as smoking. The tars, carbon monoxide, and other toxic chemicals in tobacco cause the harmful effects. · The side effects of nicotine replacement products depend on the type of product. For example, a patch can make your skin red and itchy. Medicines in pill form can make you sick to your stomach. They can also cause dry mouth and trouble sleeping. For most people, the side effects are not bad enough to make them stop using the products. Why might you choose to use medicines to quit smoking? · You have tried on your own to stop smoking, but you were not able to stop. · You smoke more than 5 cigarettes a day. · You want to increase your chances of quitting smoking. · You want to reduce your cravings and withdrawal symptoms. · You feel the benefits of medicine outweigh the side effects. Why might you choose not to use medicine? · You want to try quitting on your own by stopping all at once (\"cold turkey\"). · You want to cut back slowly on the number of cigarettes you smoke. · You smoke fewer than 5 cigarettes a day. · You do not like using medicine. · You feel the side effects of medicines outweigh the benefits. · You are worried about the cost of medicines. Your decision  Thinking about the facts and your feelings can help you make a decision that is right for you. Be sure you understand the benefits and risks of your options, and think about what else you need to do before you make the decision. Where can you learn more? Go to https://MDSmartSearch.comshira.[employer]. org and sign in to your "[employer] account. Enter E734 in the [facility] box to learn more about \"Deciding About Using Medicines To Quit Smoking. \"     If you do not have an account, please click on the \"Sign Up Now\" link.   Current as of: March 12, 2020               Content Version: 12.5  © 6796-4772 Healthwise, sweating, drying off with a towel, or swimming. · Always wear a seat belt when traveling in a car. Always wear a helmet when riding a bicycle or motorcycle. Expected Date Of Service: 06/18/2021 Performing Laboratory: 0 Billing Type: Third-Party Bill Bill For Surgical Tray: no

## 2021-08-30 ENCOUNTER — HOSPITAL ENCOUNTER (EMERGENCY)
Age: 31
Discharge: HOME | End: 2021-08-30
Attending: EMERGENCY MEDICINE
Payer: MEDICARE

## 2021-08-30 VITALS
HEIGHT: 67 IN | TEMPERATURE: 98.5 F | HEART RATE: 81 BPM | DIASTOLIC BLOOD PRESSURE: 64 MMHG | OXYGEN SATURATION: 100 % | SYSTOLIC BLOOD PRESSURE: 152 MMHG | BODY MASS INDEX: 42.38 KG/M2 | WEIGHT: 270 LBS | RESPIRATION RATE: 20 BRPM

## 2021-08-30 DIAGNOSIS — J02.9 PHARYNGITIS, UNSPECIFIED ETIOLOGY: Primary | ICD-10-CM

## 2021-08-30 DIAGNOSIS — N17.9 AKI (ACUTE KIDNEY INJURY) (HCC): ICD-10-CM

## 2021-08-30 LAB
ABSOLUTE EOS #: 0.17 K/UL (ref 0–0.44)
ABSOLUTE IMMATURE GRANULOCYTE: 0.04 K/UL (ref 0–0.3)
ABSOLUTE LYMPH #: 3.31 K/UL (ref 1.1–3.7)
ABSOLUTE MONO #: 0.7 K/UL (ref 0.1–1.2)
ALBUMIN SERPL-MCNC: 3.5 G/DL (ref 3.5–5.2)
ALBUMIN/GLOBULIN RATIO: 1.5 (ref 1–2.5)
ALP BLD-CCNC: 60 U/L (ref 35–104)
ALT SERPL-CCNC: 17 U/L (ref 5–33)
ANION GAP SERPL CALCULATED.3IONS-SCNC: 9 MMOL/L (ref 9–17)
AST SERPL-CCNC: 16 U/L
BASOPHILS # BLD: 1 % (ref 0–2)
BASOPHILS ABSOLUTE: 0.07 K/UL (ref 0–0.2)
BILIRUB SERPL-MCNC: 0.16 MG/DL (ref 0.3–1.2)
BUN BLDV-MCNC: 16 MG/DL (ref 6–20)
BUN/CREAT BLD: ABNORMAL (ref 9–20)
C-REACTIVE PROTEIN: 7.2 MG/L (ref 0–5)
CALCIUM SERPL-MCNC: 8.6 MG/DL (ref 8.6–10.4)
CHLORIDE BLD-SCNC: 109 MMOL/L (ref 98–107)
CO2: 19 MMOL/L (ref 20–31)
CREAT SERPL-MCNC: 1.29 MG/DL (ref 0.5–0.9)
DIFFERENTIAL TYPE: ABNORMAL
DIRECT EXAM: NORMAL
EOSINOPHILS RELATIVE PERCENT: 2 % (ref 1–4)
GFR AFRICAN AMERICAN: 59 ML/MIN
GFR NON-AFRICAN AMERICAN: 49 ML/MIN
GFR SERPL CREATININE-BSD FRML MDRD: ABNORMAL ML/MIN/{1.73_M2}
GFR SERPL CREATININE-BSD FRML MDRD: ABNORMAL ML/MIN/{1.73_M2}
GLUCOSE BLD-MCNC: 107 MG/DL (ref 70–99)
HCG QUALITATIVE: NEGATIVE
HCT VFR BLD CALC: 33.1 % (ref 36.3–47.1)
HEMOGLOBIN: 10.5 G/DL (ref 11.9–15.1)
IMMATURE GRANULOCYTES: 0 %
LYMPHOCYTES # BLD: 36 % (ref 24–43)
Lab: NORMAL
MCH RBC QN AUTO: 27.7 PG (ref 25.2–33.5)
MCHC RBC AUTO-ENTMCNC: 31.7 G/DL (ref 28.4–34.8)
MCV RBC AUTO: 87.3 FL (ref 82.6–102.9)
MONOCYTES # BLD: 8 % (ref 3–12)
NRBC AUTOMATED: 0 PER 100 WBC
PDW BLD-RTO: 13.8 % (ref 11.8–14.4)
PLATELET # BLD: 299 K/UL (ref 138–453)
PLATELET ESTIMATE: ABNORMAL
PMV BLD AUTO: 9.5 FL (ref 8.1–13.5)
POTASSIUM SERPL-SCNC: 3.9 MMOL/L (ref 3.7–5.3)
RBC # BLD: 3.79 M/UL (ref 3.95–5.11)
RBC # BLD: ABNORMAL 10*6/UL
SEDIMENTATION RATE, ERYTHROCYTE: 7 MM (ref 0–20)
SEG NEUTROPHILS: 53 % (ref 36–65)
SEGMENTED NEUTROPHILS ABSOLUTE COUNT: 4.97 K/UL (ref 1.5–8.1)
SODIUM BLD-SCNC: 137 MMOL/L (ref 135–144)
SPECIMEN DESCRIPTION: NORMAL
TOTAL PROTEIN: 5.9 G/DL (ref 6.4–8.3)
WBC # BLD: 9.3 K/UL (ref 3.5–11.3)
WBC # BLD: ABNORMAL 10*3/UL

## 2021-08-30 PROCEDURE — 85652 RBC SED RATE AUTOMATED: CPT

## 2021-08-30 PROCEDURE — 87880 STREP A ASSAY W/OPTIC: CPT

## 2021-08-30 PROCEDURE — 85025 COMPLETE CBC W/AUTO DIFF WBC: CPT

## 2021-08-30 PROCEDURE — 6360000002 HC RX W HCPCS: Performed by: STUDENT IN AN ORGANIZED HEALTH CARE EDUCATION/TRAINING PROGRAM

## 2021-08-30 PROCEDURE — 96374 THER/PROPH/DIAG INJ IV PUSH: CPT

## 2021-08-30 PROCEDURE — 80053 COMPREHEN METABOLIC PANEL: CPT

## 2021-08-30 PROCEDURE — 86140 C-REACTIVE PROTEIN: CPT

## 2021-08-30 PROCEDURE — 84703 CHORIONIC GONADOTROPIN ASSAY: CPT

## 2021-08-30 PROCEDURE — 6370000000 HC RX 637 (ALT 250 FOR IP): Performed by: STUDENT IN AN ORGANIZED HEALTH CARE EDUCATION/TRAINING PROGRAM

## 2021-08-30 PROCEDURE — 99283 EMERGENCY DEPT VISIT LOW MDM: CPT

## 2021-08-30 RX ORDER — KETOROLAC TROMETHAMINE 30 MG/ML
30 INJECTION, SOLUTION INTRAMUSCULAR; INTRAVENOUS ONCE
Status: COMPLETED | OUTPATIENT
Start: 2021-08-30 | End: 2021-08-30

## 2021-08-30 RX ADMIN — BENZOCAINE AND MENTHOL 1 LOZENGE: 15; 3.6 LOZENGE ORAL at 04:23

## 2021-08-30 RX ADMIN — KETOROLAC TROMETHAMINE 30 MG: 30 INJECTION, SOLUTION INTRAMUSCULAR; INTRAVENOUS at 05:34

## 2021-08-30 ASSESSMENT — PAIN DESCRIPTION - LOCATION: LOCATION: THROAT

## 2021-08-30 ASSESSMENT — ENCOUNTER SYMPTOMS
ABDOMINAL PAIN: 0
COUGH: 0
SHORTNESS OF BREATH: 0
TROUBLE SWALLOWING: 1
DIARRHEA: 0
NAUSEA: 0
WHEEZING: 0
VOMITING: 0
PHOTOPHOBIA: 0
SORE THROAT: 1

## 2021-08-30 ASSESSMENT — PAIN DESCRIPTION - PAIN TYPE: TYPE: ACUTE PAIN

## 2021-08-30 ASSESSMENT — PAIN SCALES - GENERAL
PAINLEVEL_OUTOF10: 10
PAINLEVEL_OUTOF10: 10

## 2021-08-30 NOTE — ED NOTES
Patient arrived to the ED alert and oriented x4  Patient has complaints of a sore throat the past few days  Patient states she has had difficulty swallowing d/t the sore throat  Patient resting on cot at this time  Patient rates pain 10/10  Dr. Yusef Bain at bedside doing Strep swab  Patient updated on plan of care      Kenton Gooden RN  08/30/21 [id number]

## 2021-08-30 NOTE — ED NOTES
The following labs were labeled with patient stickers & tubed to lab;    [x]Lavender   []On Ice  []Blue  [x]Green/ Yellow  []Green/ Black []On Ice  []Pink  []Red  [x]Yellow    []COVID-19 Swab []Rapid    []Urine Sample  []Pelvic Cultures    []Blood Cultures    Strep Swab     Benigno Portillo RN  08/30/21 2665

## 2021-08-30 NOTE — ED PROVIDER NOTES
[facility] ED  Emergency Department Encounter  EmergencyMedicine Resident     Pt Gus Ac  MRN: [id number]  [place] 1990  Date of evaluation: 8/30/21  PCP:  Lynn Grayson MD    This patient was evaluated in the Emergency Department for symptoms described in the history of present illness. The patient was evaluated in the context of the global COVID-19 pandemic, which necessitated consideration that the patient might be at risk for infection with the SARS-CoV-2 virus that causes COVID-19. Institutional protocols and algorithms that pertain to the evaluation of patients at risk for COVID-19 are in a state of rapid change based on information released by regulatory bodies including the CDC and federal and state organizations. These policies and algorithms were followed during the patient's care in the ED. CHIEF COMPLAINT       No chief complaint on file. HISTORY OF PRESENT ILLNESS  (Location/Symptom, Timing/Onset, Context/Setting, Quality, Duration, Modifying Factors, Severity.)      Billy Carrera is a 28 y.o. female who presents with days of progressively worsening sore throat and difficulty swallowing. Patient denies any fever any chills was Covid vaccinated x2 denies any sick contacts any chest pain shortness of breath or trauma denies any dental issues    PAST MEDICAL / SURGICAL / SOCIAL / FAMILY HISTORY      has a past medical history of Asthma, Bipolar disorder, mixed ([address] Cannabis abuse, Chronic hypertension, Depression, Diabetes mellitus ([address] Obesity, and Polycystic ovarian disease. has a past surgical history that includes Tonsillectomy and adenoidectomy and [place] tooth extraction.       Social History     Socioeconomic History    Marital status: Single     Spouse name: Not on file    Number of children: Not on file    Years of education: Not on file    Highest education level: Not on file   Occupational History    Not on file   Tobacco Use    Smoking status: Current Every Day Smoker     Packs/day: 1.00     Years: 10.00     Pack years: 10.00     Types: Cigarettes    Smokeless tobacco: Never Used   Substance and Sexual Activity    Alcohol use: Yes     Comment: socially    Drug use: No    Sexual activity: Not Currently     Partners: Female, Male   Other Topics Concern    Not on file   Social History Narrative    Not on file     Social Determinants of Health     Financial Resource Strain: Low Risk     Difficulty of Paying Living Expenses: Not hard at all   Food Insecurity: No Food Insecurity    Worried About Running Out of Food in the Last Year: Never true    Amber of Food in the Last Year: Never true   Transportation Needs: Unmet Transportation Needs    Lack of Transportation (Medical): Yes    Lack of Transportation (Non-Medical): Yes   Physical Activity:     Days of Exercise per Week:     Minutes of Exercise per Session:    Stress:     Feeling of Stress :    Social Connections:     Frequency of Communication with Friends and Family:     Frequency of Social Gatherings with Friends and Family:     Attends Jainism Services:     Active Member of Clubs or Organizations:     Attends Club or Organization Meetings:     Marital Status:    Intimate Partner Violence:     Fear of Current or Ex-Partner:     Emotionally Abused:     Physically Abused:     Sexually Abused:        Family History   Problem Relation Age of Onset    High Blood Pressure Mother     Diabetes Maternal Grandfather     High Blood Pressure Maternal Grandfather     Schizophrenia Maternal Aunt     Bipolar Disorder Maternal Aunt     Schizophrenia Maternal Aunt     Colon Cancer Neg Hx     Breast Cancer Neg Hx     Uterine Cancer Neg Hx     Ovarian Cancer Neg Hx        Allergies:  Latex, Fruit & vegetable daily [nutritional supplements], and Seasonal    Home Medications:  Prior to Admission medications    Medication Sig Start Date End Date Taking?  Authorizing Provider fluticasone (FLONASE) 50 MCG/ACT nasal spray 1 spray by Each Nostril route daily 4/30/21   Hoa Cruz DO   menthol-cetylpyridinium (CEPACOL REGULAR STRENGTH) 3 MG lozenge Take 1 lozenge by mouth as needed for Sore Throat 4/30/21   Hoa Cruz DO   hydrOXYzine (VISTARIL) 50 MG capsule Take 50 mg by mouth 3 times daily as needed 1/20/21   Dio Provider, MD   acetaminophen (TYLENOL) 500 MG tablet Take 1 tablet by mouth 3 times daily as needed for Pain 12/9/20   Markusolaus Layer, DO   benzocaine (ORAJEL) 10 % mucosal gel Take 1 g by mouth 2 times daily as needed for Pain Apply to tooth as needed. 12/9/20   Monaus Layer, DO   Misc. Devices MISC 1 PAIR OF DIABETIC SHOES (1 LEFT/ 1 RIGHT)  1 -3 PAIRS OF INSERTS (LEFT/ RIGHT) 11/13/20   Joe Brown DPM   hydrocortisone valerate (WESTCORT) 0.2 % ointment Apply topically daily. 11/13/20   Joe Brown DPM   clotrimazole-betamethasone (LOTRISONE) 1-0.05 % cream Apply topically 2 times daily.  11/13/20   Joe Brown DPM   ibuprofen (ADVIL;MOTRIN) 800 MG tablet Take 1 tablet by mouth every 8 hours as needed for Pain 11/2/20   Edna Thurston MD   albuterol sulfate HFA (VENTOLIN HFA) 108 (90 Base) MCG/ACT inhaler Inhale 2 puffs into the lungs every 6 hours as needed for Wheezing 8/11/20   Dante Boswell MD   diphenhydrAMINE (BENADRYL) 25 MG capsule Take 1 capsule by mouth every 4 hours as needed for Itching 8/11/20   Dante Boswell MD   amLODIPine (NORVASC) 5 MG tablet Take 1 tablet by mouth daily 8/11/20   Dante Boswell MD   FLUoxetine (PROZAC) 40 MG capsule Take 1 capsule by mouth daily 1/8/17   Trupti Benedict MD   ziprasidone (GEODON) 40 MG capsule Take 1 capsule by mouth 2 times daily (with meals) 1/8/17   Trupti Benedict MD   traZODone (DESYREL) 50 MG tablet Take 1 tablet by mouth nightly as needed for Sleep 1/8/17   Trupti Benedict MD       REVIEW OF SYSTEMS    (2-9 systems for level 4, 10 or more for level 5)      Review of Systems   Constitutional: Positive for activity change and fatigue. HENT: Positive for sore throat and trouble swallowing. Eyes: Negative for photophobia. Respiratory: Negative for cough, shortness of breath and wheezing. Cardiovascular: Negative for chest pain. Gastrointestinal: Negative for abdominal pain, diarrhea, nausea and vomiting. Genitourinary: Negative for dysuria. Musculoskeletal: Positive for neck pain. Negative for gait problem and neck stiffness. Skin: Negative for wound. Allergic/Immunologic: Positive for environmental allergies and food allergies. Neurological: Negative for speech difficulty and headaches. Hematological: Positive for adenopathy. Psychiatric/Behavioral: Negative for agitation and confusion. PHYSICAL EXAM   (up to 7 for level 4, 8 or more for level 5)      INITIAL VITALS:   BP (!) 152/64   Pulse 81   Temp 98.5 °F (36.9 °C) (Oral)   Resp 20   Ht 5' 7\" (1.702 m)   Wt 270 lb (122.5 kg)   SpO2 100%   BMI 42.29 kg/m²     Physical Exam  Vitals reviewed. Constitutional:       Comments: Uncomfortable nontoxic   HENT:      Head: Normocephalic. Right Ear: External ear normal.      Left Ear: External ear normal.      Nose: Nose normal.      Mouth/Throat:      Mouth: Mucous membranes are moist.      Pharynx: No oropharyngeal exudate or posterior oropharyngeal erythema. Comments: No restless, no elevation for the mouth no peritonsillar abscess uvula is midline palate elevates symmetrically  Eyes:      Conjunctiva/sclera: Conjunctivae normal.   Cardiovascular:      Rate and Rhythm: Normal rate. Pulses: Normal pulses. Pulmonary:      Effort: Pulmonary effort is normal. No respiratory distress. Breath sounds: No stridor. No wheezing. Abdominal:      Palpations: Abdomen is soft. Tenderness: There is no abdominal tenderness. Musculoskeletal:         General: Normal range of motion. Cervical back: Normal range of motion. Tenderness present. No rigidity.       Left lower leg: No edema. Lymphadenopathy:      Cervical: Cervical adenopathy present. Skin:     General: Skin is warm. Capillary Refill: Capillary refill takes less than 2 seconds. Neurological:      Mental Status: She is alert and oriented to person, place, and time. Psychiatric:         Mood and Affect: Mood normal.         DIFFERENTIAL  DIAGNOSIS     PLAN (LABS / IMAGING / EKG):  Orders Placed This Encounter   Procedures    Strep Screen Group A Throat    C-REACTIVE PROTEIN    CBC WITH AUTO DIFFERENTIAL    COMPREHENSIVE METABOLIC PANEL    HCG, SERUM, QUALITATIVE    Sedimentation Rate       MEDICATIONS ORDERED:  Orders Placed This Encounter   Medications    benzocaine-menthol (CEPACOL SORE THROAT) lozenge 1 lozenge       DDX: strep, viral illness, doubt deep space abscess    DIAGNOSTIC RESULTS / EMERGENCY DEPARTMENT COURSE / MDM   LAB RESULTS:  Results for orders placed or performed during the hospital encounter of 08/30/21   Strep Screen Group A Throat    Specimen: Throat   Result Value Ref Range    Specimen Description . THROAT     Special Requests NOT REPORTED     Direct Exam       Rapid Strep A negative. A negative Rapid Group A Strep Screen result does not rule out the possibility of Group A Streptococci in the specimen.  A Group A Strep DNA test is available upon request.   C-REACTIVE PROTEIN   Result Value Ref Range    CRP 7.2 (H) 0.0 - 5.0 mg/L   CBC WITH AUTO DIFFERENTIAL   Result Value Ref Range    WBC 9.3 3.5 - 11.3 k/uL    RBC 3.79 (L) 3.95 - 5.11 m/uL    Hemoglobin 10.5 (L) 11.9 - 15.1 g/dL    Hematocrit 33.1 (L) 36.3 - 47.1 %    MCV 87.3 82.6 - 102.9 fL    MCH 27.7 25.2 - 33.5 pg    MCHC 31.7 28.4 - 34.8 g/dL    RDW 13.8 11.8 - 14.4 %    Platelets 292 528 - 754 k/uL    MPV 9.5 8.1 - 13.5 fL    NRBC Automated 0.0 0.0 per 100 WBC    Differential Type NOT REPORTED     Seg Neutrophils 53 36 - 65 %    Lymphocytes 36 24 - 43 %    Monocytes 8 3 - 12 %    Eosinophils % 2 1 - 4 %    Basophils 1 0 - 2 %    Immature Granulocytes 0 0 %    Segs Absolute 4.97 1.50 - 8.10 k/uL    Absolute Lymph # 3.31 1.10 - 3.70 k/uL    Absolute Mono # 0.70 0.10 - 1.20 k/uL    Absolute Eos # 0.17 0.00 - 0.44 k/uL    Basophils Absolute 0.07 0.00 - 0.20 k/uL    Absolute Immature Granulocyte 0.04 0.00 - 0.30 k/uL    WBC Morphology NOT REPORTED     RBC Morphology NOT REPORTED     Platelet Estimate NOT REPORTED    COMPREHENSIVE METABOLIC PANEL   Result Value Ref Range    Glucose 107 (H) 70 - 99 mg/dL    BUN 16 6 - 20 mg/dL    CREATININE 1.29 (H) 0.50 - 0.90 mg/dL    Bun/Cre Ratio NOT REPORTED 9 - 20    Calcium 8.6 8.6 - 10.4 mg/dL    Sodium 137 135 - 144 mmol/L    Potassium 3.9 3.7 - 5.3 mmol/L    Chloride 109 (H) 98 - 107 mmol/L    CO2 19 (L) 20 - 31 mmol/L    Anion Gap 9 9 - 17 mmol/L    Alkaline Phosphatase 60 35 - 104 U/L    ALT 17 5 - 33 U/L    AST 16 <32 U/L    Total Bilirubin 0.16 (L) 0.3 - 1.2 mg/dL    Total Protein 5.9 (L) 6.4 - 8.3 g/dL    Albumin 3.5 3.5 - 5.2 g/dL    Albumin/Globulin Ratio 1.5 1.0 - 2.5    GFR Non- 49 (L) >60 mL/min    GFR  59 (L) >60 mL/min    GFR Comment          GFR Staging NOT REPORTED    HCG, SERUM, QUALITATIVE   Result Value Ref Range    hCG Qual NEGATIVE NEGATIVE   Sedimentation Rate   Result Value Ref Range    Sed Rate 7 0 - 20 mm       IMPRESSION: Alert uncomfortable but nontoxic morbidly obese 52-year-old female in no acute distress complaining of 3 days of pharyngitis for range of motion of her neck without any meningeal signs lymphadenopathy is present no pharyngeal exudate    RADIOLOGY:  No results found.       EKG  none    All EKG's are interpreted by the Emergency Department Physician who either signs or Co-signs this chart in the absence of a cardiologist.    EMERGENCY DEPARTMENT COURSE:  ED Course as of Aug 30 0602   [facility] Aug 30, 2021   6237 Strep neg    [BG]      ED Course User Index  [BG] Renny Xavier DO PROCEDURES:  none    CONSULTS:  None    CRITICAL CARE:  None    FINAL IMPRESSION      1. Pharyngitis, unspecified etiology    2. SHON (acute kidney injury) ([facility] [address]          DISPOSITION / PLAN     DISPOSITION  CO home with outpatient followup      PATIENT REFERRED TO:  Jem Araujo MD  [address] [facility] [address] [facility] [address]  [phone]    Call today  for followup and reevaluation in 1-2 days.  need to followup pharyngitis and elevated creatinine    [facility] ED  [address] [address]  [phone]  Go to   If symptoms worsen, As needed      DISCHARGE MEDICATIONS:  Discharge Medication List as of 8/30/2021  5:38 AM      START taking these medications    Details   Benzocaine-Menthol (CEPACOL EXTRA STRENGTH) 15-2.6 MG LOZG lozenge Take 1 lozenge by mouth every 2 hours as needed for Sore Throat, Disp-20 lozenge, R-0Print             Angela Harris DO  Emergency Medicine Resident    (Please note that portions of thisnote were completed with a voice recognition program.  Efforts were made to edit the dictations but occasionally words are mis-transcribed.)       Angela Harris DO  Resident  08/30/21 2617

## 2021-08-30 NOTE — ED PROVIDER NOTES
[address] [facility]     Emergency Department     Faculty Attestation    I performed a history and physical examination of the patient and discussed management with the resident. I have reviewed and agree with the residents findings including all diagnostic interpretations, and treatment plans as written. Any areas of disagreement are noted on the chart. I was personally present for the key portions of any procedures. I have documented in the chart those procedures where I was not present during the key portions. I have reviewed the emergency nurses triage note. I agree with the chief complaint, past medical history, past surgical history, allergies, medications, social and family history as documented unless otherwise noted below. Documentation of the HPI, Physical Exam and Medical Decision Making performed by yaniibjosé miguel is based on my personal performance of the HPI, PE and MDM. For Physician Assistant/ Nurse Practitioner cases/documentation I have personally evaluated this patient and have completed at least one if not all key elements of the E/M (history, physical exam, and MDM). Additional findings are as noted. 29 yo F c/o difficulty swallowing, sore throat, no fever, has had covid vaccine, no vomit,   pe vss rubi, gcs 15, moist membranes, no exudate, tolerating secretions, clear speech, neck supple with full rom,   Non toxic    hcg-, wbc 9, strep -, vss, discharged     EKG Interpretation    Interpreted by me      CRITICAL CARE: There was a high probability of clinically significant/life threatening deterioration in this patient's condition which required my urgent intervention. Total critical care time was 5 minutes. This excludes any time for separately reportable procedures.        [facility] 24, DO  08/30/21 [id number] [facility] 54, DO  08/30/21 8896

## 2021-08-31 ENCOUNTER — HOSPITAL ENCOUNTER (EMERGENCY)
Age: 31
Discharge: HOME | End: 2021-08-31
Attending: EMERGENCY MEDICINE
Payer: MEDICARE

## 2021-08-31 ENCOUNTER — APPOINTMENT (OUTPATIENT)
Dept: CT IMAGING | Age: 31
End: 2021-08-31
Payer: MEDICARE

## 2021-08-31 VITALS
RESPIRATION RATE: 18 BRPM | SYSTOLIC BLOOD PRESSURE: 153 MMHG | HEART RATE: 74 BPM | HEIGHT: 67 IN | OXYGEN SATURATION: 100 % | DIASTOLIC BLOOD PRESSURE: 87 MMHG | WEIGHT: 270 LBS | TEMPERATURE: 98.7 F | BODY MASS INDEX: 42.38 KG/M2

## 2021-08-31 DIAGNOSIS — J02.9 PHARYNGITIS, UNSPECIFIED ETIOLOGY: Primary | ICD-10-CM

## 2021-08-31 LAB
ABSOLUTE EOS #: 0.14 K/UL (ref 0–0.44)
ABSOLUTE IMMATURE GRANULOCYTE: 0.04 K/UL (ref 0–0.3)
ABSOLUTE LYMPH #: 3.16 K/UL (ref 1.1–3.7)
ABSOLUTE MONO #: 0.65 K/UL (ref 0.1–1.2)
ANION GAP SERPL CALCULATED.3IONS-SCNC: 11 MMOL/L (ref 9–17)
BASOPHILS # BLD: 1 % (ref 0–2)
BASOPHILS ABSOLUTE: 0.05 K/UL (ref 0–0.2)
BUN BLDV-MCNC: 15 MG/DL (ref 6–20)
BUN/CREAT BLD: ABNORMAL (ref 9–20)
CALCIUM SERPL-MCNC: 8.8 MG/DL (ref 8.6–10.4)
CHLORIDE BLD-SCNC: 109 MMOL/L (ref 98–107)
CO2: 20 MMOL/L (ref 20–31)
CREAT SERPL-MCNC: 1.33 MG/DL (ref 0.5–0.9)
DIFFERENTIAL TYPE: ABNORMAL
EOSINOPHILS RELATIVE PERCENT: 2 % (ref 1–4)
GFR AFRICAN AMERICAN: 57 ML/MIN
GFR NON-AFRICAN AMERICAN: 47 ML/MIN
GFR SERPL CREATININE-BSD FRML MDRD: ABNORMAL ML/MIN/{1.73_M2}
GFR SERPL CREATININE-BSD FRML MDRD: ABNORMAL ML/MIN/{1.73_M2}
GLUCOSE BLD-MCNC: 125 MG/DL (ref 70–99)
HCT VFR BLD CALC: 36 % (ref 36.3–47.1)
HEMOGLOBIN: 11.4 G/DL (ref 11.9–15.1)
IMMATURE GRANULOCYTES: 0 %
LYMPHOCYTES # BLD: 34 % (ref 24–43)
MCH RBC QN AUTO: 27.4 PG (ref 25.2–33.5)
MCHC RBC AUTO-ENTMCNC: 31.7 G/DL (ref 28.4–34.8)
MCV RBC AUTO: 86.5 FL (ref 82.6–102.9)
MONOCYTES # BLD: 7 % (ref 3–12)
NRBC AUTOMATED: 0 PER 100 WBC
PDW BLD-RTO: 13.7 % (ref 11.8–14.4)
PLATELET # BLD: 325 K/UL (ref 138–453)
PLATELET ESTIMATE: ABNORMAL
PMV BLD AUTO: 9.6 FL (ref 8.1–13.5)
POTASSIUM SERPL-SCNC: 3.9 MMOL/L (ref 3.7–5.3)
RBC # BLD: 4.16 M/UL (ref 3.95–5.11)
RBC # BLD: ABNORMAL 10*6/UL
SEG NEUTROPHILS: 56 % (ref 36–65)
SEGMENTED NEUTROPHILS ABSOLUTE COUNT: 5.16 K/UL (ref 1.5–8.1)
SODIUM BLD-SCNC: 140 MMOL/L (ref 135–144)
WBC # BLD: 9.2 K/UL (ref 3.5–11.3)
WBC # BLD: ABNORMAL 10*3/UL

## 2021-08-31 PROCEDURE — 80048 BASIC METABOLIC PNL TOTAL CA: CPT

## 2021-08-31 PROCEDURE — 70491 CT SOFT TISSUE NECK W/DYE: CPT

## 2021-08-31 PROCEDURE — 6360000002 HC RX W HCPCS: Performed by: STUDENT IN AN ORGANIZED HEALTH CARE EDUCATION/TRAINING PROGRAM

## 2021-08-31 PROCEDURE — 6360000004 HC RX CONTRAST MEDICATION: Performed by: STUDENT IN AN ORGANIZED HEALTH CARE EDUCATION/TRAINING PROGRAM

## 2021-08-31 PROCEDURE — 85025 COMPLETE CBC W/AUTO DIFF WBC: CPT

## 2021-08-31 RX ORDER — DEXAMETHASONE SODIUM PHOSPHATE 10 MG/ML
10 INJECTION INTRAMUSCULAR; INTRAVENOUS ONCE
Status: COMPLETED | OUTPATIENT
Start: 2021-08-31 | End: 2021-08-31

## 2021-08-31 RX ADMIN — DEXAMETHASONE SODIUM PHOSPHATE 10 MG: 10 INJECTION INTRAMUSCULAR; INTRAVENOUS at 03:30

## 2021-08-31 RX ADMIN — IOPAMIDOL 75 ML: 755 INJECTION, SOLUTION INTRAVENOUS at 04:08

## 2021-08-31 ASSESSMENT — ENCOUNTER SYMPTOMS
NAUSEA: 0
BACK PAIN: 0
COUGH: 0
VOMITING: 0
DIARRHEA: 0
VOICE CHANGE: 0
TROUBLE SWALLOWING: 1
SHORTNESS OF BREATH: 0
EYE PAIN: 0
SORE THROAT: 1
ABDOMINAL PAIN: 0

## 2021-08-31 NOTE — ED TRIAGE NOTES
Pt presents with cc throat pain and swelling. Per pt she was here this morning, got blood work done and swab for strep. Pt discharged and represents with increased swelling  And pain. On RA and sats 99%, pt feels like voice is hoarse and having difficult time taking deep breaths. Pt speaks in complete sentences, no stridor or wheezing noted.  Throat with minimal redness and no swelling appreciated

## 2021-08-31 NOTE — ED PROVIDER NOTES
[facility] ED  Emergency Department Encounter  EmergencyMedicineResident     This patient was seen during the COVID-19 crisis. There were limited resources and those resources we did have had to be conserved for the sickest of patients. Pt Name: Gertrude Cedillo  MRN: [id number]  [place] 1990  Date of evaluation: 8/31/21  PCP: Jeff Crockett MD    [address] [place] [address]       Chief Complaint   Patient presents with    Pharyngitis       HISTORY OF PRESENT ILLNESS  (Location/Symptom, Timing/Onset, Context/Setting, Quality, Duration, Modifying Factors, Severity.)      Gertrude Cedillo is a 28 y.o. female who presents for evaluation of neck pain. Patient reports she was seen here yesterday for difficulty swallowing and sore throat. Patient had a work-up that included both hematology, inflammatory markers and rapid strep swab. Her CRP was elevated her work-up otherwise came back within normal limits. Patient was discharged home with recommendations for follow-up or return if symptoms get worse. Patient states that she thinks her symptoms have significantly worsened. Patient denies fever chills. Patient was not started on antibiotics. Patient notes she had molar extraction 1 month ago. PAST MEDICAL / SURGICAL /SOCIAL / FAMILY HISTORY      has a past medical history of Asthma, Bipolar disorder, mixed ([address] Cannabis abuse, Chronic hypertension, Depression, Diabetes mellitus ([address] Obesity, and Polycystic ovarian disease. has a past surgical history that includes Tonsillectomy and adenoidectomy and [place] tooth extraction.       Social History     Socioeconomic History    Marital status: Single     Spouse name: Not on file    Number of children: Not on file    Years of education: Not on file    Highest education level: Not on file   Occupational History    Not on file   Tobacco Use    Smoking status: Current Every Day Smoker     Packs/day: 0.50     Years: 10.00     Pack years: 5.00 Types: Cigarettes    Smokeless tobacco: Never Used   Substance and Sexual Activity    Alcohol use: Yes     Comment: socially    Drug use: No    Sexual activity: Not Currently     Partners: Female, Male   Other Topics Concern    Not on file   Social History Narrative    Not on file     Social Determinants of Health     Financial Resource Strain: Low Risk     Difficulty of Paying Living Expenses: Not hard at all   Food Insecurity: No Food Insecurity    Worried About Running Out of Food in the Last Year: Never true    Amber of Food in the Last Year: Never true   Transportation Needs: Unmet Transportation Needs    Lack of Transportation (Medical): Yes    Lack of Transportation (Non-Medical): Yes   Physical Activity:     Days of Exercise per Week:     Minutes of Exercise per Session:    Stress:     Feeling of Stress :    Social Connections:     Frequency of Communication with Friends and Family:     Frequency of Social Gatherings with Friends and Family:     Attends Protestant Services:     Active Member of Clubs or Organizations:     Attends Club or Organization Meetings:     Marital Status:    Intimate Partner Violence:     Fear of Current or Ex-Partner:     Emotionally Abused:     Physically Abused:     Sexually Abused:        Family History   Problem Relation Age of Onset    High Blood Pressure Mother     Diabetes Maternal Grandfather     High Blood Pressure Maternal Grandfather     Schizophrenia Maternal Aunt     Bipolar Disorder Maternal Aunt     Schizophrenia Maternal Aunt     Colon Cancer Neg Hx     Breast Cancer Neg Hx     Uterine Cancer Neg Hx     Ovarian Cancer Neg Hx        Allergies:  Latex, Fruit & vegetable daily [nutritional supplements], and Seasonal    Home Medications:  Prior to Admission medications    Medication Sig Start Date End Date Taking?  Authorizing Provider   Benzocaine-Menthol (CEPACOL EXTRA STRENGTH) 15-2.6 MG LOZG lozenge Take 1 lozenge by mouth every 2 hours as needed for Sore Throat 8/30/21   Colton Deutsch,    fluticasone (FLONASE) 50 MCG/ACT nasal spray 1 spray by Each Nostril route daily 4/30/21   Lakewood Saucer, DO   menthol-cetylpyridinium (CEPACOL REGULAR STRENGTH) 3 MG lozenge Take 1 lozenge by mouth as needed for Sore Throat 4/30/21   Liz Saucer, DO   hydrOXYzine (VISTARIL) 50 MG capsule Take 50 mg by mouth 3 times daily as needed 1/20/21   Historical Provider, MD   acetaminophen (TYLENOL) 500 MG tablet Take 1 tablet by mouth 3 times daily as needed for Pain 12/9/20   Javier Shane, DO   Misc. Devices MISC 1 PAIR OF DIABETIC SHOES (1 LEFT/ 1 RIGHT)  1 -3 PAIRS OF INSERTS (LEFT/ RIGHT) 11/13/20   Shaka Cuevas DPM   hydrocortisone valerate (WESTCORT) 0.2 % ointment Apply topically daily. 11/13/20   Shaka Cuevas DPM   clotrimazole-betamethasone (LOTRISONE) 1-0.05 % cream Apply topically 2 times daily. 11/13/20   Shaka Cuevas DPM   ibuprofen (ADVIL;MOTRIN) 800 MG tablet Take 1 tablet by mouth every 8 hours as needed for Pain 11/2/20   Artemio Thurston MD   albuterol sulfate HFA (VENTOLIN HFA) 108 (90 Base) MCG/ACT inhaler Inhale 2 puffs into the lungs every 6 hours as needed for Wheezing 8/11/20   Susan Hassan MD   diphenhydrAMINE (BENADRYL) 25 MG capsule Take 1 capsule by mouth every 4 hours as needed for Itching 8/11/20   Susan Hassan MD   amLODIPine (NORVASC) 5 MG tablet Take 1 tablet by mouth daily 8/11/20   Susan Hassan MD   FLUoxetine (PROZAC) 40 MG capsule Take 1 capsule by mouth daily 1/8/17   Carlos Zuñiga MD   ziprasidone (GEODON) 40 MG capsule Take 1 capsule by mouth 2 times daily (with meals) 1/8/17   Carlos Zuñiga MD   traZODone (DESYREL) 50 MG tablet Take 1 tablet by mouth nightly as needed for Sleep 1/8/17   Carlos Zuñiga MD       REVIEW OF SYSTEMS    (2-9 systems for level 4, 10 or more forlevel 5)      Review of Systems   Constitutional: Negative for activity change, chills and fever.    HENT: Positive for sore throat and trouble swallowing. Negative for congestion, drooling, ear pain and voice change. Eyes: Negative for pain and visual disturbance. Respiratory: Negative for cough and shortness of breath. Cardiovascular: Negative for chest pain. Gastrointestinal: Negative for abdominal pain, diarrhea, nausea and vomiting. Genitourinary: Negative for difficulty urinating, dysuria and hematuria. Musculoskeletal: Negative for back pain and myalgias. Skin: Negative for rash and wound. Neurological: Negative for dizziness, light-headedness and headaches. Psychiatric/Behavioral: Negative for agitation and confusion. PHYSICAL EXAM   (up to 7 for level 4, 8 or more forlevel 5)      ED TRIAGE VITALS BP: (!) 148/91, Temp: 98.7 °F (37.1 °C), Pulse: 85, Resp: 22, SpO2: 99 %    Vitals:    08/30/21 2124 08/31/21 0018   BP: (!) 148/91 (!) 153/87   Pulse: 85 74   Resp: 22 18   Temp: 98.7 °F (37.1 °C)    TempSrc: Oral    SpO2: 99% 100%   Weight: 270 lb (122.5 kg)    Height: 5' 7\" (1.702 m)          Physical Exam  Vitals and nursing note reviewed. Constitutional:       Appearance: Normal appearance. She is obese. HENT:      Head: Normocephalic and atraumatic. Nose: Nose normal.      Mouth/Throat:      Mouth: Mucous membranes are moist.      Tonsils: No tonsillar exudate or tonsillar abscesses. 0 on the right. 0 on the left. Eyes:      Extraocular Movements: Extraocular movements intact. Pupils: Pupils are equal, round, and reactive to light. Neck:      Comments: Soft tissue swelling over the left side of the neck  Cardiovascular:      Rate and Rhythm: Normal rate and regular rhythm. Pulses: Normal pulses. Heart sounds: Normal heart sounds. Pulmonary:      Effort: Pulmonary effort is normal.      Breath sounds: Normal breath sounds. Abdominal:      General: Abdomen is flat. Palpations: Abdomen is soft. Musculoskeletal:         General: Normal range of motion.    Skin:     General: Skin is warm and dry.      Capillary Refill: Capillary refill takes less than 2 seconds. Neurological:      General: No focal deficit present. Mental Status: She is alert and oriented to person, place, and time. Psychiatric:         Mood and Affect: Mood normal.         Behavior: Behavior normal.           DIFFERENTIAL  DIAGNOSIS     PLAN (LABS / IMAGING / EKG):  Orders Placed This Encounter   Procedures    CT SOFT TISSUE NECK W CONTRAST    CBC Auto Differential    Basic Metabolic Panel w/ Reflex to MG       MEDICATIONS ORDERED:  ED Medication Orders (From admission, onward)    Start Ordered     Status Ordering Provider    08/31/21 0405 08/31/21 0405  iopamidol (ISOVUE-370) 76 % injection 75 mL  IMG ONCE PRN      Last MAR action: Given - by Jacob Abbott on 08/31/21 at [address] noel    08/31/21 0330 08/31/21 0328  dexamethasone (DECADRON) injection 10 mg  ONCE      Last MAR action: Given - by Star Fletcher on 08/31/21 at [address] [facility], ELLIOT L          DDX: Soft tissue abscess, retropharyngeal abscess, Lemierre's syndrome, pharyngitis unspecified    DIAGNOSTIC RESULTS / EMERGENCY DEPARTMENT COURSE / MDM     IMPRESSION & INITIAL PLAN:  This 81-year-old female presented emerged from today for evaluation of left-sided neck pain. She is seen here yesterday discharged home with diagnosis of parotitis unspecified. Her work-up yesterday was unremarkable aside from a mildly elevated CRP. Patient notes that she has had increased swelling over the left side of her neck with progressive worsening of swallowing. Plan to get a CT soft tissue of the neck to evaluate for soft tissue abscess versus infection versus retropharyngeal abscess. Patient will be given 10 mg of Decadron IV here in the emergency department for swelling and pain control. CT soft tissue neck was negative for any infectious etiology or processes.     Patient urged to follow-up with her PCP or return to the emergency department if symptoms did not resolve. LABS:  Results for orders placed or performed during the hospital encounter of 08/31/21   CBC Auto Differential   Result Value Ref Range    WBC 9.2 3.5 - 11.3 k/uL    RBC 4.16 3.95 - 5.11 m/uL    Hemoglobin 11.4 (L) 11.9 - 15.1 g/dL    Hematocrit 36.0 (L) 36.3 - 47.1 %    MCV 86.5 82.6 - 102.9 fL    MCH 27.4 25.2 - 33.5 pg    MCHC 31.7 28.4 - 34.8 g/dL    RDW 13.7 11.8 - 14.4 %    Platelets 154 649 - 871 k/uL    MPV 9.6 8.1 - 13.5 fL    NRBC Automated 0.0 0.0 per 100 WBC    Differential Type NOT REPORTED     Seg Neutrophils 56 36 - 65 %    Lymphocytes 34 24 - 43 %    Monocytes 7 3 - 12 %    Eosinophils % 2 1 - 4 %    Basophils 1 0 - 2 %    Immature Granulocytes 0 0 %    Segs Absolute 5.16 1.50 - 8.10 k/uL    Absolute Lymph # 3.16 1.10 - 3.70 k/uL    Absolute Mono # 0.65 0.10 - 1.20 k/uL    Absolute Eos # 0.14 0.00 - 0.44 k/uL    Basophils Absolute 0.05 0.00 - 0.20 k/uL    Absolute Immature Granulocyte 0.04 0.00 - 0.30 k/uL    WBC Morphology NOT REPORTED     RBC Morphology NOT REPORTED     Platelet Estimate NOT REPORTED    Basic Metabolic Panel w/ Reflex to MG   Result Value Ref Range    Glucose 125 (H) 70 - 99 mg/dL    BUN 15 6 - 20 mg/dL    CREATININE 1.33 (H) 0.50 - 0.90 mg/dL    Bun/Cre Ratio NOT REPORTED 9 - 20    Calcium 8.8 8.6 - 10.4 mg/dL    Sodium 140 135 - 144 mmol/L    Potassium 3.9 3.7 - 5.3 mmol/L    Chloride 109 (H) 98 - 107 mmol/L    CO2 20 20 - 31 mmol/L    Anion Gap 11 9 - 17 mmol/L    GFR Non-African American 47 (L) >60 mL/min    GFR  57 (L) >60 mL/min    GFR Comment          GFR Staging NOT REPORTED        RADIOLOGY:  CT SOFT TISSUE NECK W CONTRAST   Final Result   No acute abnormality of the soft tissue structures of the neck. CONSULTS:  None    CRITICAL CARE:  See attending physician note    FINAL IMPRESSION      1.  Pharyngitis, unspecified etiology          DISPOSITION / PLAN     DISPOSITION Decision To Discharge 08/31/2021 05:58:29 AM      PATIENT REFERRED TO:  Hermelinda Sinclair MD  [address] [facility] [address]  [phone]    In 1 day      [facility] ED  [facility]  [phone]    If symptoms worsen      DISCHARGE MEDICATIONS:  Discharge Medication List as of 8/31/2021  6:05 AM        Discharge Medication List as of 8/31/2021  6:05 AM           Jammie Farris MD  Emergency Medicine Resident    (Please note that portions of this note were completed with a voice recognition program.  Efforts were made to edit the dictations but occasionally words are mis-transcribed.)       Jammie Farris MD  Resident  08/31/21 0556

## 2021-08-31 NOTE — ED NOTES
Pt reports increased difficulty swallowing d/t pain. Awake and oriented. NAD at this time.  Will continue to monitor       Sharlene Brown RN  08/31/21 4983

## 2021-08-31 NOTE — ED NOTES
Pt returned from [place], eating food without any distress     Sebastian Mejia RN  08/31/21 0047

## 2021-08-31 NOTE — ED NOTES
Bed: 05  Expected date:   Expected time:   Means of arrival:   Comments:     Harika Irwin RN  08/31/21 2060

## 2021-09-02 NOTE — ED PROVIDER NOTES
[facility]   Emergency Department  Faculty Attestation       I performed a history and physical examination of the patient and discussed management with the resident. I reviewed the residents note and agree with the documented findings including all diagnostic interpretations and plan of care. Any areas of disagreement are noted on the chart. I was personally present for the key portions of any procedures. I have documented in the chart those procedures where I was not present during the key portions. I have reviewed the emergency nurses triage note. I agree with the chief complaint, past medical history, past surgical history, allergies, medications, social and family history as documented unless otherwise noted below. Documentation of the HPI, Physical Exam and Medical Decision Making performed by yaniibjosé miguel is based on my personal performance of the HPI, PE and MDM. For Physician Assistant/ Nurse Practitioner cases/documentation I have personally evaluated this patient and have completed at least one if not all key elements of the E/M (history, physical exam, and MDM). Additional findings are as noted. Pertinent Comments     Primary Care Physician: Francisco Rogers MD      ED Triage Vitals [08/30/21 2124]   BP Temp Temp Source Pulse Resp SpO2 Height Weight   (!) 148/91 98.7 °F (37.1 °C) Oral 85 22 99 % 5' 7\" (1.702 m) 270 lb (122.5 kg)        History/Physical: This is a 28 y.o. female who presents to the Emergency Department with complaint of sore throat and now left-sided neck pain. Patient was seen previously for the same complaint at which time she had blood work a rapid strep inflammatory markers done with a CRP elevation no other acute findings. However now feels like there is swelling going down the left side of her lateral neck is making it more difficult for her to swallow. However she states she is stable able to tolerate food and her secretions. No fevers.   No chest pain or shortness of breath. On exam, patient appears to feel ill, but is not in extremis. Normocephalic atraumatic. Moist mucous membranes with posterior oropharynx with some very minimal erythema however no tonsillar exudate or hypertrophy. No asymmetry of posterior oropharynx and uvula is midline. She is controlling secretions out difficulty. Talking in a normal voice. She does have some firmness and swelling along the lateral aspect of the left neck however does not extend to the submandibular and sublingual space. She does not elevation of her tongue. She also does not have any trismus of her neck. Area of her neck is also tender. Heart sounds are regular lungs are clear to auscultation. Alert and oriented x4 moving all extremities equally    MDM/Plan:   Sore throat with worsening fullness of her neck. Does not have any clinical signs of Clark's. But does have a firmness on the left side of the neck that could be concerning for a posterior pharyngeal abscess that is draining downwards, however she does not have any swelling in the back of her throat is controlling secretions. However given the location, we discussed with patient we will recheck labs and get CT soft tissue neck to ensure there is no signs of abscess. We will also give Decadron for swelling. CT scan is negative, patient is on oral intake. Okay to discharge given strict return precautions.       Critical Care: None     Jaswinder Avila MD  Attending Emergency Physician        Jaswinder Avila MD  09/01/21 0266

## 2021-12-22 NOTE — PROGRESS NOTES
Attending Physician Statement  I have discussed the care of Leonor Leach, including pertinent history and exam findings with the resident. I have reviewed the key elements of all parts of the encounter with the resident. I agree with the assessment, and status of the problem list as documented. Diagnosis Orders   1. Prediabetes  POCT glycosylated hemoglobin (Hb A1C)    Basic Metabolic Panel    CBC With Auto Differential    Protein / Creatinine Ratio, Urine   2. Flat feet, bilateral  Foot Care Products (SPENCO ORTHOTIC ARCH SUPPORTS) MISC   3. Bipolar disorder, mixed ([facility]     4. Morbid obesity with BMI of 50.0-59.9, adult ([facility]     5. Uncomplicated asthma, unspecified asthma severity, unspecified whether persistent  albuterol sulfate HFA (VENTOLIN HFA) 108 (90 Base) MCG/ACT inhaler   6. Chronic hypertension  Blood Pressure KIT    amLODIPine (NORVASC) 5 MG tablet   7. Seasonal allergies  diphenhydrAMINE (BENADRYL) 25 MG capsule    fluticasone (FLONASE) 50 MCG/ACT nasal spray   8. Smoker  nicotine (NICODERM CQ) 21 MG/24HR      The plan and orders should include   Orders Placed This Encounter   Procedures    Basic Metabolic Panel    CBC With Auto Differential    Protein / Creatinine Ratio, Urine    POCT glycosylated hemoglobin (Hb A1C)    and this was also documented by the resident. She is bipolar and follows with Alberto. The medication list was reviewed with the resident and is up to date. The return visit should be in 1 month .     Dr Stephane Jarrell MD, [address]  Associate , Department of Internal Medicine  Resident Ambulatory Site Medical Director  [facility] Internal Medicine  [address] [place] [address],[address]  Internal Medicine Clerkship - Daryle Reach    8/11/2020, 10:50 AM Abdomen soft, non-tender, no guarding.

## 2022-01-13 ENCOUNTER — HOSPITAL ENCOUNTER (EMERGENCY)
Age: 32
Discharge: HOME | End: 2022-01-14
Attending: EMERGENCY MEDICINE
Payer: MEDICARE

## 2022-01-13 VITALS
HEART RATE: 102 BPM | RESPIRATION RATE: 18 BRPM | DIASTOLIC BLOOD PRESSURE: 88 MMHG | SYSTOLIC BLOOD PRESSURE: 132 MMHG | OXYGEN SATURATION: 100 % | TEMPERATURE: 98.8 F

## 2022-01-13 DIAGNOSIS — A59.9 TRICHIMONIASIS: ICD-10-CM

## 2022-01-13 DIAGNOSIS — N30.00 ACUTE CYSTITIS WITHOUT HEMATURIA: Primary | ICD-10-CM

## 2022-01-13 PROCEDURE — 87591 N.GONORRHOEAE DNA AMP PROB: CPT

## 2022-01-13 PROCEDURE — 87480 CANDIDA DNA DIR PROBE: CPT

## 2022-01-13 PROCEDURE — 96372 THER/PROPH/DIAG INJ SC/IM: CPT

## 2022-01-13 PROCEDURE — 87660 TRICHOMONAS VAGIN DIR PROBE: CPT

## 2022-01-13 PROCEDURE — 86780 TREPONEMA PALLIDUM: CPT

## 2022-01-13 PROCEDURE — 99282 EMERGENCY DEPT VISIT SF MDM: CPT

## 2022-01-13 PROCEDURE — 87086 URINE CULTURE/COLONY COUNT: CPT

## 2022-01-13 PROCEDURE — 81001 URINALYSIS AUTO W/SCOPE: CPT

## 2022-01-13 PROCEDURE — 81025 URINE PREGNANCY TEST: CPT

## 2022-01-13 PROCEDURE — 87510 GARDNER VAG DNA DIR PROBE: CPT

## 2022-01-13 PROCEDURE — 87491 CHLMYD TRACH DNA AMP PROBE: CPT

## 2022-01-13 PROCEDURE — 6370000000 HC RX 637 (ALT 250 FOR IP): Performed by: STUDENT IN AN ORGANIZED HEALTH CARE EDUCATION/TRAINING PROGRAM

## 2022-01-13 PROCEDURE — 87389 HIV-1 AG W/HIV-1&-2 AB AG IA: CPT

## 2022-01-13 RX ORDER — ACETAMINOPHEN 500 MG
1000 TABLET ORAL ONCE
Status: COMPLETED | OUTPATIENT
Start: 2022-01-13 | End: 2022-01-13

## 2022-01-13 RX ADMIN — ACETAMINOPHEN 1000 MG: 500 TABLET ORAL at 23:33

## 2022-01-13 ASSESSMENT — PAIN SCALES - GENERAL: PAINLEVEL_OUTOF10: 7

## 2022-01-14 LAB
-: NORMAL
AMORPHOUS: NORMAL
BACTERIA: NORMAL
BILIRUBIN URINE: NEGATIVE
CANDIDA SPECIES, DNA PROBE: NEGATIVE
CASTS UA: NORMAL /LPF (ref 0–8)
COLOR: YELLOW
COMMENT UA: ABNORMAL
CRYSTALS, UA: NORMAL /HPF
EPITHELIAL CELLS UA: NORMAL /HPF (ref 0–5)
GARDNERELLA VAGINALIS, DNA PROBE: NEGATIVE
GLUCOSE URINE: NEGATIVE
HCG(URINE) PREGNANCY TEST: NEGATIVE
HIV AG/AB: NONREACTIVE
KETONES, URINE: NEGATIVE
LEUKOCYTE ESTERASE, URINE: ABNORMAL
MUCUS: NORMAL
NITRITE, URINE: NEGATIVE
OTHER OBSERVATIONS UA: NORMAL
PH UA: 5.5 (ref 5–8)
PROTEIN UA: NEGATIVE
RBC UA: NORMAL /HPF (ref 0–4)
RENAL EPITHELIAL, UA: NORMAL /HPF
SOURCE: ABNORMAL
SPECIFIC GRAVITY UA: 1.03 (ref 1–1.03)
T. PALLIDUM, IGG: NONREACTIVE
TRICHOMONAS VAGINALIS DNA: POSITIVE
TRICHOMONAS: NORMAL
TURBIDITY: CLEAR
URINE HGB: NEGATIVE
UROBILINOGEN, URINE: NORMAL
WBC UA: NORMAL /HPF (ref 0–5)
YEAST: NORMAL

## 2022-01-14 PROCEDURE — 6360000002 HC RX W HCPCS: Performed by: STUDENT IN AN ORGANIZED HEALTH CARE EDUCATION/TRAINING PROGRAM

## 2022-01-14 PROCEDURE — 6370000000 HC RX 637 (ALT 250 FOR IP): Performed by: STUDENT IN AN ORGANIZED HEALTH CARE EDUCATION/TRAINING PROGRAM

## 2022-01-14 RX ORDER — METRONIDAZOLE 500 MG/1
2000 TABLET ORAL ONCE
Status: COMPLETED | OUTPATIENT
Start: 2022-01-14 | End: 2022-01-14

## 2022-01-14 RX ORDER — CEPHALEXIN 500 MG/1
500 CAPSULE ORAL 2 TIMES DAILY
Qty: 14 CAPSULE | Refills: 0 | Status: SHIPPED | OUTPATIENT
Start: 2022-01-14 | End: 2022-01-21

## 2022-01-14 RX ORDER — CEFTRIAXONE 500 MG/1
500 INJECTION, POWDER, FOR SOLUTION INTRAMUSCULAR; INTRAVENOUS ONCE
Status: COMPLETED | OUTPATIENT
Start: 2022-01-14 | End: 2022-01-14

## 2022-01-14 RX ORDER — DOXYCYCLINE HYCLATE 100 MG
100 TABLET ORAL ONCE
Status: COMPLETED | OUTPATIENT
Start: 2022-01-14 | End: 2022-01-14

## 2022-01-14 RX ORDER — DOXYCYCLINE HYCLATE 100 MG
100 TABLET ORAL 2 TIMES DAILY
Qty: 14 TABLET | Refills: 0 | Status: SHIPPED | OUTPATIENT
Start: 2022-01-14 | End: 2022-01-21

## 2022-01-14 RX ORDER — FLUCONAZOLE 150 MG/1
150 TABLET ORAL ONCE
Qty: 1 TABLET | Refills: 0 | Status: SHIPPED | OUTPATIENT
Start: 2022-01-14 | End: 2022-01-14

## 2022-01-14 RX ADMIN — CEFTRIAXONE SODIUM 500 MG: 500 INJECTION, POWDER, FOR SOLUTION INTRAMUSCULAR; INTRAVENOUS at 00:43

## 2022-01-14 RX ADMIN — DOXYCYCLINE HYCLATE 100 MG: 100 TABLET, COATED ORAL at 00:43

## 2022-01-14 RX ADMIN — METRONIDAZOLE 2000 MG: 500 TABLET ORAL at 00:44

## 2022-01-14 ASSESSMENT — ENCOUNTER SYMPTOMS
SHORTNESS OF BREATH: 0
COUGH: 0
NAUSEA: 0
VOMITING: 0
ABDOMINAL PAIN: 0

## 2022-01-14 NOTE — ED NOTES
Bed: 42  Expected date:   Expected time:   Means of arrival:   Comments:     Alton De La O, BIJU  01/13/22 1058

## 2022-01-14 NOTE — ED NOTES
The following labs labeled with pt sticker and tubed to lab:     [] Blue     [] Lavender   [] on ice  [] Green/yellow  [] Green/black [] on ice  [x] Yellow  [] Red  [] Pink      [] COVID-19 swab    [] Rapid  [] PCR  [] Flu swab  [] Peds Viral Panel     [x] Urine Sample  [x] Pelvic Cultures  [] Blood Cultures            Dick Fairchild LPN  61/47/27 7273

## 2022-01-14 NOTE — ED PROVIDER NOTES
[facility]     Emergency Department     Faculty Attestation    I performed a history and physical examination of the patient and discussed management with the resident. I have reviewed and agree with the residents findings including all diagnostic interpretations, and treatment plans as written. Any areas of disagreement are noted on the chart. I was personally present for the key portions of any procedures. I have documented in the chart those procedures where I was not present during the key portions. I have reviewed the emergency nurses triage note. I agree with the chief complaint, past medical history, past surgical history, allergies, medications, social and family history as documented unless otherwise noted below. Documentation of the HPI, Physical Exam and Medical Decision Making performed by scribjosé miguel is based on my personal performance of the HPI, PE and MDM. For Physician Assistant/ Nurse Practitioner cases/documentation I have personally evaluated this patient and have completed at least one if not all key elements of the E/M (history, physical exam, and MDM). Additional findings are as noted. Vaginal discharge, for the pat week, and itching in her vagina, no dysuria no hematuria, she is on birth control. No abdominal pain    Patient with soft abdomen, non tendner to palpation. Pelvic exam to be performed by resident.  Check pelvic swabs, pregnancy,patient requesting hiv testing    Patricia Davis D.O, M.P.H  Attending Emergency Medicine Physician         Patricia Davis DO  01/13/22 6835

## 2022-01-14 NOTE — ED PROVIDER NOTES
[facility] ED  Emergency Department Encounter  Emergency Medicine Resident     Pt Name: Madeleine Sanchez  MRN: [id number]  [place] 1990  Date of evaluation: 1/13/22  PCP:  Yakelin Rodriguez MD    [address] [place] [address]       Chief Complaint   Patient presents with    Vaginal Discharge       HISTORY OFPRESENT ILLNESS  (Location/Symptom, Timing/Onset, Context/Setting, Quality, Duration, Modifying Factors,Severity.)      Madeleine Sanchez is a 28 y. o.yo female who reports that she is allergic to latex complain of anginal irritation after she used latex condoms. She reports that since then she been having vaginal irritation but no vaginal bleeding. Patient reports he never been pregnant before. She denies any fever chills or abdominal pain. No urinary dysuria or frequency. Patient reports that she has no other complaints today. PAST MEDICAL / SURGICAL / SOCIAL / FAMILY HISTORY      has a past medical history of Asthma, Bipolar disorder, mixed ([address] Cannabis abuse, Chronic hypertension, Depression, Diabetes mellitus ([address] Obesity, and Polycystic ovarian disease. has a past surgical history that includes Tonsillectomy and adenoidectomy and [place] tooth extraction.      Social History     Socioeconomic History    Marital status: Single     Spouse name: Not on file    Number of children: Not on file    Years of education: Not on file    Highest education level: Not on file   Occupational History    Not on file   Tobacco Use    Smoking status: Current Every Day Smoker     Packs/day: 0.50     Years: 10.00     Pack years: 5.00     Types: Cigarettes    Smokeless tobacco: Never Used   Substance and Sexual Activity    Alcohol use: Yes     Comment: socially    Drug use: No    Sexual activity: Not Currently     Partners: Female, Male   Other Topics Concern    Not on file   Social History Narrative    Not on file     Social Determinants of Health     Financial Resource Strain: Low Risk     Difficulty of Paying Living Expenses: Not hard at all   Food Insecurity: No Food Insecurity    Worried About Running Out of Food in the Last Year: Never true    Ran Out of Food in the Last Year: Never true   Transportation Needs: Unmet Transportation Needs    Lack of Transportation (Medical): Yes    Lack of Transportation (Non-Medical): Yes   Physical Activity:     Days of Exercise per Week: Not on file    Minutes of Exercise per Session: Not on file   Stress:     Feeling of Stress : Not on file   Social Connections:     Frequency of Communication with Friends and Family: Not on file    Frequency of Social Gatherings with Friends and Family: Not on file    Attends Baptism Services: Not on file    Active Member of [address] [place] [address] [employer] or Organizations: Not on file    Attends Club or Organization Meetings: Not on file    Marital Status: Not on file   Intimate Partner Violence:     Fear of Current or Ex-Partner: Not on file    Emotionally Abused: Not on file    Physically Abused: Not on file    Sexually Abused: Not on file   Housing Stability:     Unable to Pay for Housing in the Last Year: Not on file    Number of [place] in the Last Year: Not on file    Unstable Housing in the Last Year: Not on file       Family History   Problem Relation Age of Onset    High Blood Pressure Mother     Diabetes Maternal Grandfather     High Blood Pressure Maternal Grandfather     Schizophrenia Maternal Aunt     Bipolar Disorder Maternal Aunt     Schizophrenia Maternal Aunt     Colon Cancer Neg Hx     Breast Cancer Neg Hx     Uterine Cancer Neg Hx     Ovarian Cancer Neg Hx         Allergies:  Latex, Fruit & vegetable daily [nutritional supplements], and Seasonal    Home Medications:  Prior to Admission medications    Medication Sig Start Date End Date Taking?  Authorizing Provider   cephALEXin (KEFLEX) 500 MG capsule Take 1 capsule by mouth 2 times daily for 7 days 1/14/22 1/21/22 Yes Roberta Christian MD doxycycline hyclate (VIBRA-TABS) 100 MG tablet Take 1 tablet by mouth 2 times daily for 7 days 1/14/22 1/21/22 Yes Lacy Anglin MD   fluconazole (DIFLUCAN) 150 MG tablet Take 1 tablet by mouth once for 1 dose If you develop white discharge after taking antibiotics, then take this antibiotic. 1/14/22 1/14/22 Yes Caleb Osorio MD   Benzocaine-Menthol (CEPACOL EXTRA STRENGTH) 15-2.6 MG LOZG lozenge Take 1 lozenge by mouth every 2 hours as needed for Sore Throat 8/30/21   Divya Harris,    fluticasone (FLONASE) 50 MCG/ACT nasal spray 1 spray by Each Nostril route daily 4/30/21   Pratibha Birch DO   menthol-cetylpyridinium (CEPACOL REGULAR STRENGTH) 3 MG lozenge Take 1 lozenge by mouth as needed for Sore Throat 4/30/21   Pratibha Birch DO   hydrOXYzine (VISTARIL) 50 MG capsule Take 50 mg by mouth 3 times daily as needed 1/20/21   Historical Provider, MD   acetaminophen (TYLENOL) 500 MG tablet Take 1 tablet by mouth 3 times daily as needed for Pain 12/9/20   King Fox DO   Misc. Devices MISC 1 PAIR OF DIABETIC SHOES (1 LEFT/ 1 RIGHT)  1 -3 PAIRS OF INSERTS (LEFT/ RIGHT) 11/13/20   Chano Ebbing, DPM   hydrocortisone valerate (WESTCORT) 0.2 % ointment Apply topically daily. 11/13/20   Chano Ebbing, DPM   clotrimazole-betamethasone (LOTRISONE) 1-0.05 % cream Apply topically 2 times daily.  11/13/20   Chano Ebbing, DPM   ibuprofen (ADVIL;MOTRIN) 800 MG tablet Take 1 tablet by mouth every 8 hours as needed for Pain 11/2/20   Annika Thurston MD   albuterol sulfate HFA (VENTOLIN HFA) 108 (90 Base) MCG/ACT inhaler Inhale 2 puffs into the lungs every 6 hours as needed for Wheezing 8/11/20   Willi Feldman MD   diphenhydrAMINE (BENADRYL) 25 MG capsule Take 1 capsule by mouth every 4 hours as needed for Itching 8/11/20   Willi Feldman MD   amLODIPine (NORVASC) 5 MG tablet Take 1 tablet by mouth daily 8/11/20   Willi Feldman MD   FLUoxetine (PROZAC) 40 MG capsule Take 1 capsule by mouth daily 1/8/17   Amanda COLEMAN Thuy Garcia MD   ziprasidone (GEODON) 40 MG capsule Take 1 capsule by mouth 2 times daily (with meals) 1/8/17   Letcher Kawasaki, MD   traZODone (DESYREL) 50 MG tablet Take 1 tablet by mouth nightly as needed for Sleep 1/8/17   Letcher Kawasaki, MD       REVIEW OFSYSTEMS    (2-9 systems for level 4, 10 or more for level 5)      Review of Systems   Constitutional: Negative for fatigue and fever. Respiratory: Negative for cough and shortness of breath. Gastrointestinal: Negative for abdominal pain, nausea and vomiting. Genitourinary: Positive for dysuria, vaginal discharge and vaginal pain. Negative for frequency and genital sores. PHYSICAL EXAM   (up to 7 for level 4, 8 or more forlevel 5)      INITIAL VITALS:   ED Triage Vitals   BP Temp Temp src Pulse Resp SpO2 Height Weight   -- -- -- -- -- -- -- --       Physical Exam  Constitutional:       Appearance: She is obese. HENT:      Head: Normocephalic and atraumatic. Mouth/Throat:      Mouth: Mucous membranes are moist.   Eyes:      Extraocular Movements: Extraocular movements intact. Pupils: Pupils are equal, round, and reactive to light. Cardiovascular:      Rate and Rhythm: Normal rate. Abdominal:      General: There is no distension. Palpations: Abdomen is soft. Tenderness: There is no abdominal tenderness. Musculoskeletal:         General: No swelling. Cervical back: No rigidity. Skin:     Coloration: Skin is not jaundiced or pale. Neurological:      Mental Status: She is alert.    Psychiatric:         Mood and Affect: Mood normal.         Behavior: Behavior normal.         DIFFERENTIAL  DIAGNOSIS     PLAN (LABS / IMAGING / EKG):  Orders Placed This Encounter   Procedures    C.trachomatis N.gonorrhoeae DNA    VAGINITIS DNA PROBE    Culture, Urine    Urinalysis, reflex to microscopic    PREGNANCY, URINE    T. pallidum Ab    HIV Screen    Microscopic Urinalysis    Vital signs       MEDICATIONS ORDERED:  Orders Placed This Encounter   Medications    acetaminophen (TYLENOL) tablet 1,000 mg    cefTRIAXone (ROCEPHIN) injection 500 mg     Order Specific Question:   Antimicrobial Indications     Answer:   STD infection    doxycycline hyclate (VIBRA-TABS) tablet 100 mg     Order Specific Question:   Antimicrobial Indications     Answer:   STD infection    metroNIDAZOLE (FLAGYL) tablet 2,000 mg     Order Specific Question:   Antimicrobial Indications     Answer:   STD infection    cephALEXin (KEFLEX) 500 MG capsule     Sig: Take 1 capsule by mouth 2 times daily for 7 days     Dispense:  14 capsule     Refill:  0    doxycycline hyclate (VIBRA-TABS) 100 MG tablet     Sig: Take 1 tablet by mouth 2 times daily for 7 days     Dispense:  14 tablet     Refill:  0    fluconazole (DIFLUCAN) 150 MG tablet     Sig: Take 1 tablet by mouth once for 1 dose If you develop white discharge after taking antibiotics, then take this antibiotic. Dispense:  1 tablet     Refill:  0       Initial MDM/Plan: 28 y.o. female who presents with vaginal irritation. On physical exam, patient is no acute distress, no abdominal tenderness on palpation. Pelvic exam did demonstrate mucopurulent discharge with CMT, no vaginal lesions noted. 1.  Plan for swab of GC, chlamydia, vaginitis probe, patient also requesting to be tested for HIV and syphilis and thus we will send those labs  2. She will be treated with ceftriaxone and also doxycycline. If UA and other swabs are remarkable, plan to treat accordingly  3.  Plan for discharge    DIAGNOSTIC RESULTS / EMERGENCYDEPARTMENT COURSE / MDM     LABS:  Labs Reviewed   VAGINITIS DNA PROBE - Abnormal; Notable for the following components:       Result Value    Trichomonas Vaginalis DNA POSITIVE (*)     All other components within normal limits   URINALYSIS - Abnormal; Notable for the following components:    Specific Gravity, UA 1.033 (*)     Leukocyte Esterase, Urine MODERATE (*)     All other components within normal limits   C.TRACHOMATIS N.GONORRHOEAE DNA   CULTURE, URINE   PREGNANCY, URINE   T. PALLIDUM AB   HIV SCREEN   MICROSCOPIC URINALYSIS         RADIOLOGY:  No results found. EKG      All EKG's are interpreted by the Emergency Department Physicianwho either signs or Co-signs this chart in the absence of a cardiologist.    EMERGENCY DEPARTMENT COURSE:  ED Course as of 01/14/22 0055 Fri Jan 14, 2022   3098 Patient with UTI, plan to treat with Keflex. She was also found to have trichomonas will be given 2 g of Flagyl. Patient also will be started on doxycycline in addition to given the treatment of Rocephin here in the emergency room for concern of gonorrhea and chlamydia. Patient was also discharged with a dose of Diflucan and was instructed to use it she develops white discharge after antibiotic use. Patient expresses understanding [AN]      ED Course User Index  [AN] Vladislav Sevilla MD          PROCEDURES:  None    CONSULTS:  None    CRITICAL CARE:      FINAL IMPRESSION      1. Acute cystitis without hematuria    2.  Trichimoniasis          DISPOSITION / PLAN     DISPOSITION Decision To Discharge 01/14/2022 12:42:04 AM      PATIENT REFERRED TO:  [facility]  [address] [place] [address]  [phone]    If symptoms worsen    Ingrid Medrano MD  [address] [phone]      As needed      DISCHARGE MEDICATIONS:  Discharge Medication List as of 1/14/2022 12:49 AM      START taking these medications    Details   cephALEXin (KEFLEX) 500 MG capsule Take 1 capsule by mouth 2 times daily for 7 days, Disp-14 capsule, R-0Print      doxycycline hyclate (VIBRA-TABS) 100 MG tablet Take 1 tablet by mouth 2 times daily for 7 days, Disp-14 tablet, R-0Print      fluconazole (DIFLUCAN) 150 MG tablet Take 1 tablet by mouth once for 1 dose If you develop white discharge after taking antibiotics, then take this antibiotic., Disp-1 tablet, R-0Print             Assumgilbert Armando, MD  Emergency Medicine Resident    (Please note that portions of this note were completed with a voice recognition program.Efforts were made to edit the dictations but occasionally words are mis-transcribed.)        Adrian Hawk MD  Resident  01/14/22 0833

## 2022-01-14 NOTE — ED NOTES
Discharges instructions given. Verbalized understanding. All questions answered.        Hawa Gomez, HENRIQUE  63/77/58 [phone]

## 2022-01-14 NOTE — ED NOTES
Writer is extended triage nurse. Assessment and treatment for this patient was primarily performed by resident and attending with extended triage nurse performing tasks as directed. Pt seen primarily by resident and attending physicians. Nurisng assessment deferred to physician assessment.        Jase Scott, QIANN  [phone]

## 2022-01-15 LAB
CULTURE: NORMAL
Lab: NORMAL
SPECIMEN DESCRIPTION: NORMAL

## 2022-04-20 ENCOUNTER — HOSPITAL ENCOUNTER (EMERGENCY)
Age: 32
Discharge: HOME | End: 2022-04-20
Attending: EMERGENCY MEDICINE
Payer: MEDICARE

## 2022-04-20 VITALS
SYSTOLIC BLOOD PRESSURE: 138 MMHG | HEART RATE: 104 BPM | TEMPERATURE: 97 F | DIASTOLIC BLOOD PRESSURE: 83 MMHG | BODY MASS INDEX: 39.24 KG/M2 | WEIGHT: 250 LBS | OXYGEN SATURATION: 95 % | HEIGHT: 67 IN | RESPIRATION RATE: 18 BRPM

## 2022-04-20 DIAGNOSIS — N89.8 VAGINAL DISCHARGE: Primary | ICD-10-CM

## 2022-04-20 LAB
-: ABNORMAL
BACTERIA: ABNORMAL
BILIRUBIN URINE: NEGATIVE
CANDIDA SPECIES, DNA PROBE: NEGATIVE
COLOR: YELLOW
EPITHELIAL CELLS UA: ABNORMAL /HPF (ref 0–5)
GARDNERELLA VAGINALIS, DNA PROBE: NEGATIVE
GLUCOSE URINE: NEGATIVE
HCG(URINE) PREGNANCY TEST: NEGATIVE
KETONES, URINE: ABNORMAL
LEUKOCYTE ESTERASE, URINE: ABNORMAL
NITRITE, URINE: NEGATIVE
PH UA: 5 (ref 5–8)
PROTEIN UA: NEGATIVE
RBC UA: ABNORMAL /HPF (ref 0–4)
SOURCE: NORMAL
SPECIFIC GRAVITY UA: 1.03 (ref 1–1.03)
TRICHOMONAS VAGINALIS DNA: NEGATIVE
TURBIDITY: ABNORMAL
URINE HGB: ABNORMAL
UROBILINOGEN, URINE: NORMAL
WBC UA: ABNORMAL /HPF (ref 0–5)

## 2022-04-20 PROCEDURE — 87591 N.GONORRHOEAE DNA AMP PROB: CPT

## 2022-04-20 PROCEDURE — 96372 THER/PROPH/DIAG INJ SC/IM: CPT

## 2022-04-20 PROCEDURE — 99284 EMERGENCY DEPT VISIT MOD MDM: CPT

## 2022-04-20 PROCEDURE — 87480 CANDIDA DNA DIR PROBE: CPT

## 2022-04-20 PROCEDURE — 87186 SC STD MICRODIL/AGAR DIL: CPT

## 2022-04-20 PROCEDURE — 6370000000 HC RX 637 (ALT 250 FOR IP): Performed by: STUDENT IN AN ORGANIZED HEALTH CARE EDUCATION/TRAINING PROGRAM

## 2022-04-20 PROCEDURE — 81001 URINALYSIS AUTO W/SCOPE: CPT

## 2022-04-20 PROCEDURE — 6360000002 HC RX W HCPCS: Performed by: STUDENT IN AN ORGANIZED HEALTH CARE EDUCATION/TRAINING PROGRAM

## 2022-04-20 PROCEDURE — 87510 GARDNER VAG DNA DIR PROBE: CPT

## 2022-04-20 PROCEDURE — 87086 URINE CULTURE/COLONY COUNT: CPT

## 2022-04-20 PROCEDURE — 81025 URINE PREGNANCY TEST: CPT

## 2022-04-20 PROCEDURE — 87660 TRICHOMONAS VAGIN DIR PROBE: CPT

## 2022-04-20 PROCEDURE — 87088 URINE BACTERIA CULTURE: CPT

## 2022-04-20 PROCEDURE — 87491 CHLMYD TRACH DNA AMP PROBE: CPT

## 2022-04-20 RX ORDER — DOXYCYCLINE HYCLATE 100 MG
100 TABLET ORAL 2 TIMES DAILY
Qty: 14 TABLET | Refills: 0 | Status: SHIPPED | OUTPATIENT
Start: 2022-04-20 | End: 2022-04-27

## 2022-04-20 RX ORDER — ORPHENADRINE CITRATE 30 MG/ML
60 INJECTION INTRAMUSCULAR; INTRAVENOUS ONCE
Status: COMPLETED | OUTPATIENT
Start: 2022-04-20 | End: 2022-04-20

## 2022-04-20 RX ORDER — CEFTRIAXONE 500 MG/1
500 INJECTION, POWDER, FOR SOLUTION INTRAMUSCULAR; INTRAVENOUS ONCE
Status: COMPLETED | OUTPATIENT
Start: 2022-04-20 | End: 2022-04-20

## 2022-04-20 RX ORDER — CYCLOBENZAPRINE HCL 5 MG
5 TABLET ORAL 2 TIMES DAILY PRN
Qty: 20 TABLET | Refills: 0 | Status: SHIPPED | OUTPATIENT
Start: 2022-04-20 | End: 2022-04-30

## 2022-04-20 RX ORDER — DOXYCYCLINE HYCLATE 100 MG
100 TABLET ORAL ONCE
Status: COMPLETED | OUTPATIENT
Start: 2022-04-20 | End: 2022-04-20

## 2022-04-20 RX ADMIN — CEFTRIAXONE SODIUM 500 MG: 500 INJECTION, POWDER, FOR SOLUTION INTRAMUSCULAR; INTRAVENOUS at 18:35

## 2022-04-20 RX ADMIN — ORPHENADRINE CITRATE 60 MG: 30 INJECTION INTRAMUSCULAR; INTRAVENOUS at 18:34

## 2022-04-20 RX ADMIN — DOXYCYCLINE HYCLATE 100 MG: 100 TABLET, COATED ORAL at 18:34

## 2022-04-20 ASSESSMENT — PAIN - FUNCTIONAL ASSESSMENT: PAIN_FUNCTIONAL_ASSESSMENT: NONE - DENIES PAIN

## 2022-04-20 NOTE — Clinical Note
Lyndsay Del Rio was seen and treated in our emergency department on 4/20/2022. She may return to work on 04/21/2022. If you have any questions or concerns, please don't hesitate to call.       Severa Meeter, MD

## 2022-04-20 NOTE — ED NOTES
Patient states about 3 weeks ago her and her partner were using the remote for a sexual object. Patient states 2-3 weeks ago she started having itching, burning with urination and white-yellow discharge. Patient states she has some vaginal swelling also. Patient states she has not been seen for this previously.   Patient denies any abdominal discomfort     Maria Del Carmen Mitchell RN  04/20/22 [id number]

## 2022-04-20 NOTE — ED NOTES
The following labs labeled with pt sticker and tubed to lab:     [] Blue     [] Lavender   [] on ice  [] Green/yellow  [] Green/black [] on ice  [] Yellow  [] Red  [] Pink      [] COVID-19 swab    [] Rapid  [] PCR  [] Flu swab  [] Peds Viral Panel     [x] Urine Sample  [] Pelvic Cultures  [] Blood Cultures            Yannick Ravi RN  04/20/22 0702

## 2022-04-20 NOTE — ED PROVIDER NOTES
[facility]  ED  Emergency Department Encounter  EmergencyMedicine Resident     Pt Mayela Giron  MRN: [id number]  [place] 1990  Date of evaluation: 4/20/22  PCP:  No primary care provider on file. This patient was evaluated in the Emergency Department for symptoms described in the history of present illness. The patient was evaluated in the context of the global COVID-19 pandemic, which necessitated consideration that the patient might be at risk for infection with the SARS-CoV-2 virus that causes COVID-19. Institutional protocols and algorithms that pertain to the evaluation of patients at risk for COVID-19 are in a state of rapid change based on information released by regulatory bodies including the CDC and federal and state organizations. These policies and algorithms were followed during the patient's care in the ED. CHIEF COMPLAINT       Chief Complaint   Patient presents with    Vaginal Discharge       HISTORY OF PRESENT ILLNESS  (Location/Symptom, Timing/Onset, Context/Setting, Quality, Duration, Modifying Factors, Severity.)      Bety Gonzalez is a 33 y.o. female who presents with vaginal discharge. Patient presents with 2 weeks of vaginal discharge, white, associated with dysuria. Patient denies fevers, chills, cough, congestion, chest pain, shortness breath, abdominal pain, nausea, vomiting, frequency, urgency, hematuria, vaginal bleeding, diarrhea, constipation. Patient is sexually active only with females, has been treated for trichomoniasis in the past, is concerned about an STD. Patient is only sexually active with 1 female. Patient has never been pregnant. Patient would like presumptive treatment for STD. PAST MEDICAL / SURGICAL / SOCIAL / FAMILY HISTORY      has a past medical history of Asthma, Bipolar disorder, mixed ([address] Cannabis abuse, Chronic hypertension, Depression, Diabetes mellitus ([address] Obesity, and Polycystic ovarian disease.        has a past surgical history that includes Tonsillectomy and adenoidectomy and [place] tooth extraction. Social History     Socioeconomic History    Marital status: Single     Spouse name: Not on file    Number of children: Not on file    Years of education: Not on file    Highest education level: Not on file   Occupational History    Not on file   Tobacco Use    Smoking status: Current Every Day Smoker     Packs/day: 0.50     Years: 10.00     Pack years: 5.00     Types: Cigarettes    Smokeless tobacco: Never Used   Substance and Sexual Activity    Alcohol use: Yes     Comment: socially    Drug use: No    Sexual activity: Not Currently     Partners: Female, Male   Other Topics Concern    Not on file   Social History Narrative    Not on file     Social Determinants of Health     Financial Resource Strain:     Difficulty of Paying Living Expenses: Not on file   Food Insecurity:     Worried About Running Out of Food in the Last Year: Not on file    Amber of Food in the Last Year: Not on file   Transportation Needs:     Lack of Transportation (Medical): Not on file    Lack of Transportation (Non-Medical):  Not on file   Physical Activity:     Days of Exercise per Week: Not on file    Minutes of Exercise per Session: Not on file   Stress:     Feeling of Stress : Not on file   Social Connections:     Frequency of Communication with Friends and Family: Not on file    Frequency of Social Gatherings with Friends and Family: Not on file    Attends Zoroastrian Services: Not on file    Active Member of Clubs or Organizations: Not on file    Attends Club or Organization Meetings: Not on file    Marital Status: Not on file   Intimate Partner Violence:     Fear of Current or Ex-Partner: Not on file    Emotionally Abused: Not on file    Physically Abused: Not on file    Sexually Abused: Not on file   Housing Stability:     Unable to Pay for Housing in the Last Year: Not on file    Number of [facility] in the Last Year: Not on file    Unstable Housing in the Last Year: Not on file       Family History   Problem Relation Age of Onset    High Blood Pressure Mother     Diabetes Maternal Grandfather     High Blood Pressure Maternal Grandfather     Schizophrenia Maternal Aunt     Bipolar Disorder Maternal Aunt     Schizophrenia Maternal Aunt     Colon Cancer Neg Hx     Breast Cancer Neg Hx     Uterine Cancer Neg Hx     Ovarian Cancer Neg Hx        Allergies:  Latex, Fruit & vegetable daily [nutritional supplements], and Seasonal    Home Medications:  Prior to Admission medications    Medication Sig Start Date End Date Taking? Authorizing Provider   doxycycline hyclate (VIBRA-TABS) 100 MG tablet Take 1 tablet by mouth 2 times daily for 7 days 4/20/22 4/27/22 Yes Nilay Reynoso MD   cyclobenzaprine (FLEXERIL) 5 MG tablet Take 1 tablet by mouth 2 times daily as needed for Muscle spasms 4/20/22 4/30/22 Yes Nilay Reynoso MD   Benzocaine-Menthol (CEPACOL EXTRA STRENGTH) 15-2.6 MG LOZG lozenge Take 1 lozenge by mouth every 2 hours as needed for Sore Throat 8/30/21   Valfransiscoe Picket, DO   fluticasone (FLONASE) 50 MCG/ACT nasal spray 1 spray by Each Nostril route daily 4/30/21   Deretha Signs, DO   menthol-cetylpyridinium (CEPACOL REGULAR STRENGTH) 3 MG lozenge Take 1 lozenge by mouth as needed for Sore Throat 4/30/21   Deretha Signs, DO   hydrOXYzine (VISTARIL) 50 MG capsule Take 50 mg by mouth 3 times daily as needed 1/20/21   Historical Provider, MD   acetaminophen (TYLENOL) 500 MG tablet Take 1 tablet by mouth 3 times daily as needed for Pain 12/9/20   Laverne Soliz, DO   Misc. Devices MISC 1 PAIR OF DIABETIC SHOES (1 LEFT/ 1 RIGHT)  1 -3 PAIRS OF INSERTS (LEFT/ RIGHT) 11/13/20   Racheal Manning DPELIZABETH   hydrocortisone valerate (WESTCORT) 0.2 % ointment Apply topically daily. 11/13/20   Rachealbaljeet Manning, DPM   clotrimazole-betamethasone (LOTRISONE) 1-0.05 % cream Apply topically 2 times daily.  11/13/20   Carroll Simms ELEN Roca   ibuprofen (ADVIL;MOTRIN) 800 MG tablet Take 1 tablet by mouth every 8 hours as needed for Pain 11/2/20   Mallika Thurston MD   albuterol sulfate HFA (VENTOLIN HFA) 108 (90 Base) MCG/ACT inhaler Inhale 2 puffs into the lungs every 6 hours as needed for Wheezing 8/11/20   Alfonso Frey MD   diphenhydrAMINE (BENADRYL) 25 MG capsule Take 1 capsule by mouth every 4 hours as needed for Itching 8/11/20   Alfonso Frey MD   amLODIPine (NORVASC) 5 MG tablet Take 1 tablet by mouth daily 8/11/20   Alfonso Frey MD   FLUoxetine (PROZAC) 40 MG capsule Take 1 capsule by mouth daily 1/8/17   Chu Alcantara MD   ziprasidone (GEODON) 40 MG capsule Take 1 capsule by mouth 2 times daily (with meals) 1/8/17   Chu Alcantara MD   traZODone (DESYREL) 50 MG tablet Take 1 tablet by mouth nightly as needed for Sleep 1/8/17   Chu Alcantara MD       REVIEW OF SYSTEMS    (2-9 systems for level 4, 10 or more for level 5)      Review of Systems   Constitutional: Negative for chills, diaphoresis, fatigue and fever. HENT: Negative for congestion, rhinorrhea and sore throat. Respiratory: Negative for cough and shortness of breath. Cardiovascular: Negative for chest pain. Gastrointestinal: Negative for abdominal pain, blood in stool, constipation, diarrhea, nausea and vomiting. Genitourinary: Positive for dysuria and vaginal discharge. Negative for frequency, hematuria, urgency and vaginal bleeding. Skin: Negative for pallor and rash. Neurological: Negative for dizziness, syncope, weakness, light-headedness and headaches. PHYSICAL EXAM   (up to 7 for level 4, 8 or more for level 5)      INITIAL VITALS:   /83   Pulse 104   Temp 97 °F (36.1 °C) (Oral)   Resp 18   Ht 5' 7\" (1.702 m)   Wt 250 lb (113.4 kg)   SpO2 95%   BMI 39.16 kg/m²     Physical Exam  Constitutional:       General: She is not in acute distress. Appearance: Normal appearance. She is well-developed.  She is not ill-appearing, toxic-appearing or diaphoretic. HENT:      Head: Normocephalic and atraumatic. Right Ear: External ear normal.      Left Ear: External ear normal.   Eyes:      General:         Right eye: No discharge. Left eye: No discharge. Extraocular Movements: Extraocular movements intact. Neck:      Vascular: No JVD. Trachea: No tracheal deviation. Cardiovascular:      Rate and Rhythm: Normal rate and regular rhythm. Heart sounds: Normal heart sounds. No murmur heard. No friction rub. No gallop. Pulmonary:      Effort: Pulmonary effort is normal. No respiratory distress. Breath sounds: Normal breath sounds. No stridor. No wheezing, rhonchi or rales. Chest:      Chest wall: No tenderness. Abdominal:      General: There is no distension. Palpations: Abdomen is soft. There is no mass. Tenderness: There is no abdominal tenderness. There is no right CVA tenderness, left CVA tenderness or guarding. Genitourinary:     General: Normal vulva. Comments: Patient has normal external genitalia, moderate to significant amount of thicker white vaginal discharge, closed cervical os, no cervical motion tenderness, no adnexal masses or tenderness bilaterally  Musculoskeletal:         General: Normal range of motion. Cervical back: Normal range of motion and neck supple. Neurological:      Mental Status: She is alert.    Psychiatric:         Mood and Affect: Mood normal.         Behavior: Behavior normal.         DIFFERENTIAL  DIAGNOSIS     PLAN (LABS / IMAGING / EKG):  Orders Placed This Encounter   Procedures    Vaginitis DNA Probe    C.trachomatis N.gonorrhoeae DNA    Culture, Urine    Urinalysis with Microscopic    Pregnancy, Urine    Vaginal exam       MEDICATIONS ORDERED:  Orders Placed This Encounter   Medications    doxycycline hyclate (VIBRA-TABS) tablet 100 mg     Order Specific Question:   Antimicrobial Indications     Answer:   STD infection    cefTRIAXone (ROCEPHIN) injection 500 mg     Order Specific Question:   Antimicrobial Indications     Answer:   STD infection    orphenadrine (NORFLEX) injection 60 mg    doxycycline hyclate (VIBRA-TABS) 100 MG tablet     Sig: Take 1 tablet by mouth 2 times daily for 7 days     Dispense:  14 tablet     Refill:  0    cyclobenzaprine (FLEXERIL) 5 MG tablet     Sig: Take 1 tablet by mouth 2 times daily as needed for Muscle spasms     Dispense:  20 tablet     Refill:  0       DDX:     DIAGNOSTIC RESULTS / EMERGENCY DEPARTMENT COURSE / MDM   LAB RESULTS:  Results for orders placed or performed during the hospital encounter of 04/20/22   Vaginitis DNA Probe    Specimen: Vaginal   Result Value Ref Range    Source . VAGINAL SWAB     Trichomonas Vaginalis DNA NEGATIVE NEGATIVE    GARDNERELLA VAGINALIS, DNA PROBE NEGATIVE NEGATIVE    CANDIDA SPECIES, DNA PROBE NEGATIVE NEGATIVE   Urinalysis with Microscopic   Result Value Ref Range    Color, UA Yellow Yellow    Turbidity UA Cloudy (A) Clear    Glucose, Ur NEGATIVE NEGATIVE    Bilirubin Urine NEGATIVE NEGATIVE    Ketones, Urine TRACE (A) NEGATIVE    Specific Gravity, UA 1.030 1.005 - 1.030    Urine Hgb MODERATE (A) NEGATIVE    pH, UA 5.0 5.0 - 8.0    Protein, UA NEGATIVE NEGATIVE    Urobilinogen, Urine Normal Normal    Nitrite, Urine NEGATIVE NEGATIVE    Leukocyte Esterase, Urine MODERATE (A) NEGATIVE    -          WBC, UA 5 TO 10 0 - 5 /HPF    RBC, UA 0 TO 2 0 - 4 /HPF    Epithelial Cells UA 10 TO 20 0 - 5 /HPF    Bacteria, UA FEW (A) None   Pregnancy, Urine   Result Value Ref Range    HCG(Urine) Pregnancy Test NEGATIVE NEGATIVE       IMPRESSION: 67-year-old female with vaginal discharge for 2 weeks, concern for STD versus vaginitis. Patient also having some tingling with urination, concern for dysuria, will obtain urinalysis to evaluate for UTI.     RADIOLOGY:      EKG      All EKG's are interpreted by the Emergency Department Physician who either signs or Co-signs this chart in the absence of a cardiologist.    EMERGENCY DEPARTMENT COURSE:  Patient came to emergency department, HPI and physical exam were conducted. All nursing notes were reviewed. Vaginitis panel is negative, there is concern for STD with vaginal discharge, no concern for PID as the cervix is not friable, no cervical motion tenderness. Had in-depth discussion with patient regarding presumptive treatment of gonorrhea and chlamydia, patient is in agreement with presumptive treatment. Administered Rocephin, prescribed doxycycline, will have patient follow-up with OB/GYN for reevaluation. Patient remained stable emergency room with stable vital signs. Gave strict return precautions to the emergency department and discharge patient home. PROCEDURES:      CONSULTS:  None    CRITICAL CARE:      FINAL IMPRESSION      1.  Vaginal discharge          DISPOSITION / PLAN     DISPOSITION Decision To Discharge 04/20/2022 06:02:58 PM      PATIENT REFERRED TO:  [facility]  [address] [place] [address]  [phone]  Go to   As needed, If symptoms worsen    [address]  [phone]  Schedule an appointment as soon as possible for a visit in 1 week  For reassessment      DISCHARGE MEDICATIONS:  Discharge Medication List as of 4/20/2022  6:04 PM      START taking these medications    Details   doxycycline hyclate (VIBRA-TABS) 100 MG tablet Take 1 tablet by mouth 2 times daily for 7 days, Disp-14 tablet, R-0Print      cyclobenzaprine (FLEXERIL) 5 MG tablet Take 1 tablet by mouth 2 times daily as needed for Muscle spasms, Disp-20 tablet, R-0Print             Pedro Winters MD  Emergency Medicine Resident    (Please note that portions of thisnote were completed with a voice recognition program.  Efforts were made to edit the dictations but occasionally words are mis-transcribed.)        Pedro Winters MD  Resident  04/21/22 3743

## 2022-04-20 NOTE — ED PROVIDER NOTES
FACULTY SIGN-OUT  ADDENDUM       Patient: Camron Gu   MRN: [id number]  PCP:  No primary care provider on file. Attestation  I was available and discussed any additional care issues that arose and coordinated the management plans with the resident(s) caring for the patient during my duty period. Any areas of disagreement with resident's documentation of care or procedures are noted on the chart. I was personally present for the key portions of any/all procedures during my duty period. I have documented in the chart those procedures where I was not present during the key portions. The patient's initial evaluation and plan have been discussed with the prior provider who initially evaluated the patient. Pertinent Comments: The patient is a 33 y.o. female taken in signout with vaginal discharge  We are awaiting urinalysis and urine pregnancy and will recommend empiric treatment for possible STD    ED COURSE      The patient was given the following medications:  No orders of the defined types were placed in this encounter.       RECENT VITALS:   BP: 138/83  Pulse: 104  Resp: 18  Temp: 97 °F (36.1 °C) SpO2: 95 %    (Please note that portions of this note were completed with a voice recognition program.  Efforts were made to edit the dictations but occasionally words are mis-transcribed.)    MD Tamika Thompson  Attending Emergency Medicine Physician       Kirk Tidwell MD  04/20/22 5642

## 2022-04-20 NOTE — ED PROVIDER NOTES
[address]     Emergency Department     Faculty Attestation    I performed a history and physical examination of the patient and discussed management with the resident. I have reviewed and agree with the residents findings including all diagnostic interpretations, and treatment plans as written at the time of my review. Any areas of disagreement are noted on the chart. I was personally present for the key portions of any procedures. I have documented in the chart those procedures where I was not present during the key portions. For Physician Assistant/ Nurse Practitioner cases/documentation I have personally evaluated this patient and have completed at least one if not all key elements of the E/M (history, physical exam, and MDM). Additional findings are as noted. This patient was evaluated in the Emergency Department for symptoms described in the history of present illness. The patient was evaluated in the context of the global COVID-19 pandemic, which necessitated consideration that the patient might be at risk for infection with the SARS-CoV-2 virus that causes COVID-19. Institutional protocols and algorithms that pertain to the evaluation of patients at risk for COVID-19 are in a state of rapid change based on information released by regulatory bodies including the CDC and federal and state organizations. These policies and algorithms were followed during the patient's care in the ED. Primary Care Physician: No primary care provider on file. History: This is a 33 y.o. female who presents to the Emergency Department with complaint of vaginal discharge. Patient denies any abdominal pain or dysuria. Physical:   height is 5' 7\" (1.702 m) and weight is 250 lb (113.4 kg). Her oral temperature is 97 °F (36.1 °C). Her blood pressure is 138/83 and her pulse is 104. Her respiration is 18 and oxygen saturation is 95%.   Abdomen is soft nontender, pelvic examination performed by the resident. Impression: Vaginal discharge    Plan: Pelvic labs, urinalysis, and CT      (Please note that portions of this note were completed with a voice recognition program.  Efforts were made to edit the dictations but occasionally words are mis-transcribed.)    Amado Keita.  Harpal Goodman MD, [facility]  Attending Emergency Medicine Physician        Candis Marti MD  04/20/22 [id number]

## 2022-04-21 LAB
C TRACH DNA GENITAL QL NAA+PROBE: NEGATIVE
CULTURE: ABNORMAL
N. GONORRHOEAE DNA: NEGATIVE
SPECIMEN DESCRIPTION: ABNORMAL
SPECIMEN DESCRIPTION: NORMAL

## 2022-04-21 ASSESSMENT — ENCOUNTER SYMPTOMS
BLOOD IN STOOL: 0
NAUSEA: 0
ABDOMINAL PAIN: 0
VOMITING: 0
RHINORRHEA: 0
COUGH: 0
DIARRHEA: 0
SORE THROAT: 0
SHORTNESS OF BREATH: 0
CONSTIPATION: 0

## 2022-05-03 ENCOUNTER — TELEPHONE (OUTPATIENT)
Dept: INTERNAL MEDICINE | Age: 32
End: 2022-05-03

## 2022-05-03 NOTE — TELEPHONE ENCOUNTER
----- Message from Luz Brittpablo sent at 5/3/2022 11:57 AM EDT -----  Subject: Appointment Request    Reason for Call: New Patient Request Appointment    QUESTIONS  Type of Appointment? New Patient/New to Provider  Reason for appointment request? No appointments available during search  Additional Information for Provider? pt would like to get established,   screened green   ---------------------------------------------------------------------------  --------------  CALL BACK INFO  What is the best way for the office to contact you? OK to leave message on   voicemail  Preferred Call Back Phone Number? [phone]  ---------------------------------------------------------------------------  --------------  SCRIPT ANSWERS  Relationship to Patient? Self  Specialty Confirmation? Primary Care  Is this the first appointment to establish care for a ? No  Have you been diagnosed with, awaiting test results for, or told that you   are suspected of having COVID-19 (Coronavirus)? (If patient has tested   negative or was tested as a requirement for work, school, or travel and   not based on symptoms, answer no)? No  Within the past 10 days have you developed any of the following symptoms   (answer no if symptoms have been present longer than 10 days or began   more than 10 days ago)? Fever or Chills, Cough, Shortness of breath or   difficulty breathing, Loss of taste or smell, Sore throat, Nasal   congestion, Sneezing or runny nose, Fatigue or generalized body aches   (answer no if pain is specific to a body part e.g. back pain), Diarrhea,   Headache? No  Have you had close contact with someone with COVID-19 in the last 7 days? No  (Service Expert  click yes below to proceed with [employer] As Usual   Scheduling)?  Yes

## 2022-05-03 NOTE — TELEPHONE ENCOUNTER
----- Message from Nathaliepedro pablo Lita sent at 5/3/2022 11:57 AM EDT -----  Subject: Appointment Request    Reason for Call: New Patient Request Appointment    QUESTIONS  Type of Appointment? New Patient/New to Provider  Reason for appointment request? No appointments available during search  Additional Information for Provider? pt would like to get established,   screened green   ---------------------------------------------------------------------------  --------------  CALL BACK INFO  What is the best way for the office to contact you? OK to leave message on   voicemail  Preferred Call Back Phone Number? [phone]  ---------------------------------------------------------------------------  --------------  SCRIPT ANSWERS  Relationship to Patient? Self  Specialty Confirmation? Primary Care  Is this the first appointment to establish care for a ? No  Have you been diagnosed with, awaiting test results for, or told that you   are suspected of having COVID-19 (Coronavirus)? (If patient has tested   negative or was tested as a requirement for work, school, or travel and   not based on symptoms, answer no)? No  Within the past 10 days have you developed any of the following symptoms   (answer no if symptoms have been present longer than 10 days or began   more than 10 days ago)? Fever or Chills, Cough, Shortness of breath or   difficulty breathing, Loss of taste or smell, Sore throat, Nasal   congestion, Sneezing or runny nose, Fatigue or generalized body aches   (answer no if pain is specific to a body part e.g. back pain), Diarrhea,   Headache? No  Have you had close contact with someone with COVID-19 in the last 7 days? No  (Service Expert  click yes below to proceed with [employer] As Usual   Scheduling)?  Yes

## 2022-05-14 ENCOUNTER — APPOINTMENT (OUTPATIENT)
Dept: GENERAL RADIOLOGY | Age: 32
End: 2022-05-14
Payer: MEDICARE

## 2022-05-14 ENCOUNTER — HOSPITAL ENCOUNTER (OUTPATIENT)
Age: 32
Setting detail: OBSERVATION
Discharge: HOME | End: 2022-05-16
Attending: EMERGENCY MEDICINE | Admitting: EMERGENCY MEDICINE
Payer: MEDICARE

## 2022-05-14 DIAGNOSIS — R07.89 OTHER CHEST PAIN: Primary | ICD-10-CM

## 2022-05-14 PROBLEM — R07.9 CHEST PAIN: Status: ACTIVE | Noted: 2022-05-14

## 2022-05-14 LAB
ABSOLUTE EOS #: 0.27 K/UL (ref 0–0.44)
ABSOLUTE IMMATURE GRANULOCYTE: 0.07 K/UL (ref 0–0.3)
ABSOLUTE LYMPH #: 2.99 K/UL (ref 1.1–3.7)
ABSOLUTE MONO #: 0.58 K/UL (ref 0.1–1.2)
ALBUMIN SERPL-MCNC: 3.9 G/DL (ref 3.5–5.2)
ALBUMIN/GLOBULIN RATIO: 1.4 (ref 1–2.5)
ALP BLD-CCNC: 68 U/L (ref 35–104)
ALT SERPL-CCNC: 20 U/L (ref 5–33)
ANION GAP SERPL CALCULATED.3IONS-SCNC: 7 MMOL/L (ref 9–17)
AST SERPL-CCNC: 14 U/L
BASOPHILS # BLD: 1 % (ref 0–2)
BASOPHILS ABSOLUTE: 0.06 K/UL (ref 0–0.2)
BILIRUB SERPL-MCNC: 0.37 MG/DL (ref 0.3–1.2)
BUN BLDV-MCNC: 11 MG/DL (ref 6–20)
CALCIUM SERPL-MCNC: 8.9 MG/DL (ref 8.6–10.4)
CHLORIDE BLD-SCNC: 105 MMOL/L (ref 98–107)
CO2: 23 MMOL/L (ref 20–31)
CREAT SERPL-MCNC: 0.99 MG/DL (ref 0.5–0.9)
D-DIMER QUANTITATIVE: <0.17 MG/L FEU
EOSINOPHILS RELATIVE PERCENT: 3 % (ref 1–4)
GFR AFRICAN AMERICAN: >60 ML/MIN
GFR NON-AFRICAN AMERICAN: >60 ML/MIN
GFR SERPL CREATININE-BSD FRML MDRD: ABNORMAL ML/MIN/{1.73_M2}
GLUCOSE BLD-MCNC: 138 MG/DL (ref 70–99)
HCT VFR BLD CALC: 38.9 % (ref 36.3–47.1)
HEMOGLOBIN: 12.8 G/DL (ref 11.9–15.1)
IMMATURE GRANULOCYTES: 1 %
LYMPHOCYTES # BLD: 30 % (ref 24–43)
MAGNESIUM: 1.9 MG/DL (ref 1.6–2.6)
MCH RBC QN AUTO: 28.4 PG (ref 25.2–33.5)
MCHC RBC AUTO-ENTMCNC: 32.9 G/DL (ref 28.4–34.8)
MCV RBC AUTO: 86.3 FL (ref 82.6–102.9)
MONOCYTES # BLD: 6 % (ref 3–12)
NRBC AUTOMATED: 0 PER 100 WBC
PDW BLD-RTO: 13 % (ref 11.8–14.4)
PLATELET # BLD: 376 K/UL (ref 138–453)
PMV BLD AUTO: 9.3 FL (ref 8.1–13.5)
POTASSIUM SERPL-SCNC: 3.4 MMOL/L (ref 3.7–5.3)
PRO-BNP: <20 PG/ML
RBC # BLD: 4.51 M/UL (ref 3.95–5.11)
SARS-COV-2, RAPID: NOT DETECTED
SEG NEUTROPHILS: 59 % (ref 36–65)
SEGMENTED NEUTROPHILS ABSOLUTE COUNT: 6.03 K/UL (ref 1.5–8.1)
SODIUM BLD-SCNC: 135 MMOL/L (ref 135–144)
SPECIMEN DESCRIPTION: NORMAL
TOTAL PROTEIN: 6.7 G/DL (ref 6.4–8.3)
TROPONIN, HIGH SENSITIVITY: <6 NG/L (ref 0–14)
TROPONIN, HIGH SENSITIVITY: <6 NG/L (ref 0–14)
WBC # BLD: 10 K/UL (ref 3.5–11.3)

## 2022-05-14 PROCEDURE — 87635 SARS-COV-2 COVID-19 AMP PRB: CPT

## 2022-05-14 PROCEDURE — 93005 ELECTROCARDIOGRAM TRACING: CPT

## 2022-05-14 PROCEDURE — 85379 FIBRIN DEGRADATION QUANT: CPT

## 2022-05-14 PROCEDURE — 99285 EMERGENCY DEPT VISIT HI MDM: CPT

## 2022-05-14 PROCEDURE — G0378 HOSPITAL OBSERVATION PER HR: HCPCS

## 2022-05-14 PROCEDURE — 2580000003 HC RX 258: Performed by: PEDIATRICS

## 2022-05-14 PROCEDURE — 71045 X-RAY EXAM CHEST 1 VIEW: CPT

## 2022-05-14 PROCEDURE — 80053 COMPREHEN METABOLIC PANEL: CPT

## 2022-05-14 PROCEDURE — 83880 ASSAY OF NATRIURETIC PEPTIDE: CPT

## 2022-05-14 PROCEDURE — 84703 CHORIONIC GONADOTROPIN ASSAY: CPT

## 2022-05-14 PROCEDURE — 83735 ASSAY OF MAGNESIUM: CPT

## 2022-05-14 PROCEDURE — 85025 COMPLETE CBC W/AUTO DIFF WBC: CPT

## 2022-05-14 PROCEDURE — 6370000000 HC RX 637 (ALT 250 FOR IP): Performed by: PEDIATRICS

## 2022-05-14 PROCEDURE — 84484 ASSAY OF TROPONIN QUANT: CPT

## 2022-05-14 RX ORDER — SODIUM CHLORIDE 9 MG/ML
INJECTION, SOLUTION INTRAVENOUS CONTINUOUS
Status: DISCONTINUED | OUTPATIENT
Start: 2022-05-14 | End: 2022-05-16

## 2022-05-14 RX ORDER — SODIUM CHLORIDE 9 MG/ML
25 INJECTION, SOLUTION INTRAVENOUS PRN
Status: DISCONTINUED | OUTPATIENT
Start: 2022-05-14 | End: 2022-05-16 | Stop reason: HOSPADM

## 2022-05-14 RX ORDER — ALBUTEROL SULFATE 90 UG/1
2 AEROSOL, METERED RESPIRATORY (INHALATION) EVERY 6 HOURS PRN
Status: DISCONTINUED | OUTPATIENT
Start: 2022-05-14 | End: 2022-05-16 | Stop reason: HOSPADM

## 2022-05-14 RX ORDER — ASPIRIN 81 MG/1
324 TABLET, CHEWABLE ORAL ONCE
Status: COMPLETED | OUTPATIENT
Start: 2022-05-14 | End: 2022-05-14

## 2022-05-14 RX ORDER — ENOXAPARIN SODIUM 100 MG/ML
40 INJECTION SUBCUTANEOUS DAILY
Status: DISCONTINUED | OUTPATIENT
Start: 2022-05-14 | End: 2022-05-15

## 2022-05-14 RX ORDER — ACETAMINOPHEN 650 MG/1
650 SUPPOSITORY RECTAL EVERY 6 HOURS PRN
Status: DISCONTINUED | OUTPATIENT
Start: 2022-05-14 | End: 2022-05-16 | Stop reason: HOSPADM

## 2022-05-14 RX ORDER — TRAZODONE HYDROCHLORIDE 50 MG/1
50 TABLET ORAL NIGHTLY PRN
Status: DISCONTINUED | OUTPATIENT
Start: 2022-05-14 | End: 2022-05-16 | Stop reason: HOSPADM

## 2022-05-14 RX ORDER — POLYETHYLENE GLYCOL 3350 17 G/17G
17 POWDER, FOR SOLUTION ORAL DAILY PRN
Status: DISCONTINUED | OUTPATIENT
Start: 2022-05-14 | End: 2022-05-16 | Stop reason: HOSPADM

## 2022-05-14 RX ORDER — SODIUM CHLORIDE 0.9 % (FLUSH) 0.9 %
5-40 SYRINGE (ML) INJECTION PRN
Status: DISCONTINUED | OUTPATIENT
Start: 2022-05-14 | End: 2022-05-16 | Stop reason: HOSPADM

## 2022-05-14 RX ORDER — SODIUM CHLORIDE 0.9 % (FLUSH) 0.9 %
5-40 SYRINGE (ML) INJECTION EVERY 12 HOURS SCHEDULED
Status: DISCONTINUED | OUTPATIENT
Start: 2022-05-14 | End: 2022-05-16 | Stop reason: HOSPADM

## 2022-05-14 RX ORDER — POTASSIUM CHLORIDE 7.45 MG/ML
10 INJECTION INTRAVENOUS PRN
Status: DISCONTINUED | OUTPATIENT
Start: 2022-05-14 | End: 2022-05-16 | Stop reason: HOSPADM

## 2022-05-14 RX ORDER — POTASSIUM CHLORIDE 20 MEQ/1
40 TABLET, EXTENDED RELEASE ORAL PRN
Status: DISCONTINUED | OUTPATIENT
Start: 2022-05-14 | End: 2022-05-16 | Stop reason: HOSPADM

## 2022-05-14 RX ORDER — FLUOXETINE HYDROCHLORIDE 20 MG/1
40 CAPSULE ORAL DAILY
Status: DISCONTINUED | OUTPATIENT
Start: 2022-05-14 | End: 2022-05-16 | Stop reason: HOSPADM

## 2022-05-14 RX ORDER — HYDROXYZINE 50 MG/1
50 TABLET, FILM COATED ORAL 3 TIMES DAILY PRN
Status: DISCONTINUED | OUTPATIENT
Start: 2022-05-14 | End: 2022-05-16 | Stop reason: HOSPADM

## 2022-05-14 RX ORDER — ZIPRASIDONE HYDROCHLORIDE 40 MG/1
40 CAPSULE ORAL 2 TIMES DAILY WITH MEALS
Status: DISCONTINUED | OUTPATIENT
Start: 2022-05-14 | End: 2022-05-16 | Stop reason: HOSPADM

## 2022-05-14 RX ORDER — ONDANSETRON 2 MG/ML
4 INJECTION INTRAMUSCULAR; INTRAVENOUS EVERY 4 HOURS PRN
Status: DISCONTINUED | OUTPATIENT
Start: 2022-05-14 | End: 2022-05-16 | Stop reason: HOSPADM

## 2022-05-14 RX ORDER — ACETAMINOPHEN 325 MG/1
650 TABLET ORAL EVERY 6 HOURS PRN
Status: DISCONTINUED | OUTPATIENT
Start: 2022-05-14 | End: 2022-05-16 | Stop reason: HOSPADM

## 2022-05-14 RX ORDER — AMLODIPINE BESYLATE 5 MG/1
5 TABLET ORAL DAILY
Status: DISCONTINUED | OUTPATIENT
Start: 2022-05-14 | End: 2022-05-16 | Stop reason: HOSPADM

## 2022-05-14 RX ADMIN — SODIUM CHLORIDE, PRESERVATIVE FREE 10 ML: 5 INJECTION INTRAVENOUS at 21:01

## 2022-05-14 RX ADMIN — ASPIRIN 324 MG: 81 TABLET, CHEWABLE ORAL at 15:36

## 2022-05-14 RX ADMIN — AMLODIPINE BESYLATE 5 MG: 5 TABLET ORAL at 21:26

## 2022-05-14 RX ADMIN — ZIPRASIDONE HYDROCHLORIDE 40 MG: 40 CAPSULE ORAL at 21:58

## 2022-05-14 RX ADMIN — ACETAMINOPHEN 650 MG: 325 TABLET ORAL at 20:57

## 2022-05-14 RX ADMIN — FLUOXETINE HYDROCHLORIDE 40 MG: 20 CAPSULE ORAL at 21:26

## 2022-05-14 RX ADMIN — SODIUM CHLORIDE: 9 INJECTION, SOLUTION INTRAVENOUS at 20:54

## 2022-05-14 ASSESSMENT — PAIN SCALES - GENERAL
PAINLEVEL_OUTOF10: 10
PAINLEVEL_OUTOF10: 0
PAINLEVEL_OUTOF10: 5
PAINLEVEL_OUTOF10: 7
PAINLEVEL_OUTOF10: 0
PAINLEVEL_OUTOF10: 10

## 2022-05-14 ASSESSMENT — PAIN SCALES - WONG BAKER
WONGBAKER_NUMERICALRESPONSE: 0

## 2022-05-14 ASSESSMENT — PAIN DESCRIPTION - LOCATION: LOCATION: CHEST;BACK

## 2022-05-14 ASSESSMENT — PAIN - FUNCTIONAL ASSESSMENT
PAIN_FUNCTIONAL_ASSESSMENT: 0-10
PAIN_FUNCTIONAL_ASSESSMENT: ACTIVITIES ARE NOT PREVENTED

## 2022-05-14 ASSESSMENT — ENCOUNTER SYMPTOMS
SORE THROAT: 0
ABDOMINAL PAIN: 0
CHOKING: 0
DIARRHEA: 0
VOMITING: 0
SHORTNESS OF BREATH: 1
RHINORRHEA: 0
CONSTIPATION: 0
COUGH: 0
WHEEZING: 0

## 2022-05-14 ASSESSMENT — PAIN DESCRIPTION - DESCRIPTORS: DESCRIPTORS: SHARP;HEAVINESS

## 2022-05-14 ASSESSMENT — PAIN DESCRIPTION - ORIENTATION: ORIENTATION: UPPER

## 2022-05-14 NOTE — ED NOTES
Labeled blood specimens sent to lab via tube system.     [x] Lavender   [] on ice   [x] Blue   [x] Green/yellow  [x] Green/black [] on ice  [] Pink  [] Red  [] Yellow  [] Blood Cultures      Richard Gallego RN  05/14/22 5001

## 2022-05-14 NOTE — CARE COORDINATION
Case Management Initial Discharge Plan  Yesenia Weston,             Met with:patient to discuss discharge plans. Information verified: address, contacts, phone number, , insurance Yes  Insurance Provider: [place] [employer] Medicare    Emergency Contact/Next of Kin name & number: Danni Rios (mother) [phone]  Who are involved in patient's support system? family    PCP: No primary care provider on file. Date of last visit: has appointment with Dr[facility]lyn Nair at [facility] on 22      Discharge Planning    Living Arrangements:    alone    Home is second floor apartment with steps to enter    Patient able to perform ADL's:Independent    Current Services (outpatient & in home) none  DME equipment: cpap  DME provider:     Is patient receiving oral anticoagulation therapy? No    If indicated:   Physician managing anticoagulation treatment:   Where does patient obtain lab work for ATC treatment? Does patient have any issues/concerns obtaining medications? No  If yes, what are patient's concerns? Is there a preferred Pharmacy after hours or on weekends? yes    If yes, which pharmacy? [facility] on [address]    Potential Assistance Needed:       Patient agreeable to home care: No  [place] of choice provided:  no    Prior SNF/Rehab Placement and Facility: no  Agreeable to SNF/Rehab: No  [place] of choice provided: no     Evaluation: no    Expected Discharge date:       Patient expects to be discharged to: If home: is the family and/or caregiver wiling & able to provide support at home? Who will be providing this support? Follow Up Appointment: Best Day/ Time:      Transportation provider: family  Transportation arrangements needed for discharge: No    Readmission Risk              Risk of Unplanned Readmission:  0             Does patient have a readmission risk score greater than 14?:   If yes, follow-up appointment must be made within 7 days of discharge.      Goals of Care: Educated patient on transitional options, provided freedom of choice and are agreeable with plan      Discharge Plan: home independently          Electronically signed by Saravanan Bah RN on 5/14/22 at 6:10 PM EDT

## 2022-05-14 NOTE — ED NOTES
The following labs labeled with pt sticker and tubed to lab:     [] Blue     [] Lavender   [] on ice  [x] Green/yellow  [] Green/black [] on ice  [] Yellow  [] Red  [] Pink      [] COVID-19 swab    [] Rapid  [] PCR  [] Flu swab  [] Peds Viral Panel     [] Urine Sample  [] Pelvic Cultures  [] Blood Cultures            Ori Kellogg RN  05/14/22 5087

## 2022-05-14 NOTE — ED PROVIDER NOTES
use: Yes     Comment: socially    Drug use: Yes     Types: Cocaine    Sexual activity: Not Currently     Partners: Female, Male   Other Topics Concern    Not on file   Social History Narrative    Not on file     Social Determinants of Health     Financial Resource Strain:     Difficulty of Paying Living Expenses: Not on file   Food Insecurity:     Worried About Running Out of Food in the Last Year: Not on file    Amber of Food in the Last Year: Not on file   Transportation Needs:     Lack of Transportation (Medical): Not on file    Lack of Transportation (Non-Medical):  Not on file   Physical Activity:     Days of Exercise per Week: Not on file    Minutes of Exercise per Session: Not on file   Stress:     Feeling of Stress : Not on file   Social Connections:     Frequency of Communication with Friends and Family: Not on file    Frequency of Social Gatherings with Friends and Family: Not on file    Attends Sabianist Services: Not on file    Active Member of [address] [place] [address] or Organizations: Not on file    Attends Club or Organization Meetings: Not on file    Marital Status: Not on file   Intimate Partner Violence:     Fear of Current or Ex-Partner: Not on file    Emotionally Abused: Not on file    Physically Abused: Not on file    Sexually Abused: Not on file   Housing Stability:     Unable to Pay for Housing in the Last Year: Not on file    Number of [place] in the Last Year: Not on file    Unstable Housing in the Last Year: Not on file       Family History   Problem Relation Age of Onset    High Blood Pressure Mother     Diabetes Maternal Grandfather     High Blood Pressure Maternal Grandfather     Schizophrenia Maternal Aunt     Bipolar Disorder Maternal Aunt     Schizophrenia Maternal Aunt     Colon Cancer Neg Hx     Breast Cancer Neg Hx     Uterine Cancer Neg Hx     Ovarian Cancer Neg Hx        Allergies:  Latex, Fruit & vegetable daily [nutritional supplements], and Seasonal    Home Medications:  Prior to Admission medications    Medication Sig Start Date End Date Taking? Authorizing Provider   hydrOXYzine (VISTARIL) 50 MG capsule Take 50 mg by mouth 3 times daily as needed 1/20/21  Yes Historical Provider, MD   acetaminophen (TYLENOL) 500 MG tablet Take 1 tablet by mouth 3 times daily as needed for Pain 12/9/20  Yes Nisreen Lott, DO   Misc. Devices MISC 1 PAIR OF DIABETIC SHOES (1 LEFT/ 1 RIGHT)  1 -3 PAIRS OF INSERTS (LEFT/ RIGHT) 11/13/20  Yes Deedee Cohen DPM   hydrocortisone valerate (WESTCORT) 0.2 % ointment Apply topically daily. 11/13/20  Yes Nikia Roca DPM   clotrimazole-betamethasone (LOTRISONE) 1-0.05 % cream Apply topically 2 times daily. 11/13/20  Yes Deedee Cohen DPM   albuterol sulfate HFA (VENTOLIN HFA) 108 (90 Base) MCG/ACT inhaler Inhale 2 puffs into the lungs every 6 hours as needed for Wheezing 8/11/20  Yes Monica Bland MD   amLODIPine (NORVASC) 5 MG tablet Take 1 tablet by mouth daily 8/11/20  Yes Monica Bland MD   FLUoxetine (PROZAC) 40 MG capsule Take 1 capsule by mouth daily 1/8/17  Yes Dunia Almaraz MD   ziprasidone (GEODON) 40 MG capsule Take 1 capsule by mouth 2 times daily (with meals) 1/8/17  Yes Dunia Almaraz MD   traZODone (DESYREL) 50 MG tablet Take 1 tablet by mouth nightly as needed for Sleep 1/8/17  Yes Dunia Almaraz MD       REVIEW OF SYSTEMS    (2-9 systems for level 4, 10 or more for level 5)      Review of Systems   Constitutional: Negative for activity change, appetite change, chills, fatigue and fever. HENT: Negative for congestion, ear pain, rhinorrhea and sore throat. Respiratory: Positive for shortness of breath. Negative for cough, choking and wheezing. Cardiovascular: Positive for chest pain. Gastrointestinal: Negative for abdominal pain, constipation, diarrhea and vomiting. Endocrine: Negative for polydipsia and polyuria.    Genitourinary: Negative for decreased urine volume, difficulty urinating, dysuria and menstrual problem. Musculoskeletal: Negative for gait problem. Skin: Negative for rash. Allergic/Immunologic: Negative for food allergies. Neurological: Negative for speech difficulty and headaches. Psychiatric/Behavioral: Negative for behavioral problems. PHYSICAL EXAM   (up to 7 for level 4, 8 or more for level 5)      INITIAL VITALS:   /85   Pulse 70   Temp 98.1 °F (36.7 °C) (Oral)   Resp 18   Ht 5' 7\" (1.702 m)   Wt (!) 325 lb (147.4 kg)   SpO2 99%   BMI 50.90 kg/m²     Physical Exam  Vitals and nursing note reviewed. Constitutional:       General: She is not in acute distress. Appearance: Normal appearance. She is well-developed. She is obese. She is not ill-appearing, toxic-appearing or diaphoretic. Comments: BP (!) 165/98   Pulse 123   Temp 98.2 °F (36.8 °C) (Oral)   Resp 20   SpO2 96%      HENT:      Head: Normocephalic and atraumatic. Right Ear: External ear normal.      Left Ear: External ear normal.      Nose: Nose normal. No congestion. Mouth/Throat:      Mouth: Mucous membranes are moist.      Pharynx: Oropharynx is clear. No oropharyngeal exudate. Eyes:      General: No scleral icterus. Right eye: No discharge. Left eye: No discharge. Conjunctiva/sclera: Conjunctivae normal.      Pupils: Pupils are equal, round, and reactive to light. Cardiovascular:      Rate and Rhythm: Regular rhythm. Tachycardia present. Pulses: Normal pulses. Heart sounds: Normal heart sounds. Pulmonary:      Effort: Pulmonary effort is normal. No respiratory distress. Breath sounds: Normal breath sounds. No wheezing. Abdominal:      Palpations: Abdomen is soft. Tenderness: There is no guarding or rebound. Musculoskeletal:      Cervical back: Normal range of motion and neck supple. Right lower leg: No edema. Left lower leg: No edema. Lymphadenopathy:      Cervical: No cervical adenopathy. Skin:     General: Skin is warm. Capillary Refill: Capillary refill takes less than 2 seconds. Findings: No rash. Neurological:      General: No focal deficit present. Mental Status: She is alert and oriented to person, place, and time. Cranial Nerves: No cranial nerve deficit.    Psychiatric:         Mood and Affect: Mood normal.         DIFFERENTIAL  DIAGNOSIS     PLAN (LABS / IMAGING / EKG):  Orders Placed This Encounter   Procedures    COVID-19, Rapid    XR CHEST PORTABLE    NM Cardiac Stress Test Nuclear Imaging    Comprehensive Metabolic Panel w/ Reflex to MG    CBC with Auto Differential    Troponin    Brain Natriuretic Peptide    D-Dimer, Quantitative    Magnesium    Troponin    Comprehensive Metabolic Panel w/ Reflex to MG    HCG Qualitative, Serum    Consult to Cardiology    Inpatient consult to Cardiology    Cardiac Stress Test- W Pharm    STRESS TEST REPORT    POC Glucose Fingerstick    POC Glucose Fingerstick    POC Glucose Fingerstick    EKG 12 Lead    EKG 12 lead    Place in Observation Service    Place in Observation Service    Discharge patient       MEDICATIONS ORDERED:  Orders Placed This Encounter   Medications    aspirin chewable tablet 324 mg    DISCONTD: albuterol sulfate  (90 Base) MCG/ACT inhaler 2 puff     Order Specific Question:   Initiate RT Bronchodilator Protocol     Answer:   No    DISCONTD: amLODIPine (NORVASC) tablet 5 mg    DISCONTD: FLUoxetine (PROZAC) capsule 40 mg    DISCONTD: hydrOXYzine (ATARAX) tablet 50 mg    DISCONTD: traZODone (DESYREL) tablet 50 mg    DISCONTD: ziprasidone (GEODON) capsule 40 mg    DISCONTD: 0.9 % sodium chloride infusion    DISCONTD: sodium chloride flush 0.9 % injection 5-40 mL    DISCONTD: sodium chloride flush 0.9 % injection 5-40 mL    DISCONTD: 0.9 % sodium chloride infusion    DISCONTD: potassium chloride (KLOR-CON M) extended release tablet 40 mEq    DISCONTD: potassium bicarb-citric acid (EFFER-K) effervescent tablet 40 mEq    DISCONTD: potassium chloride 10 mEq/100 mL IVPB (Peripheral Line)    DISCONTD: enoxaparin (LOVENOX) injection 40 mg     Order Specific Question:   Indication of Use     Answer:   Prophylaxis-DVT/PE    DISCONTD: ondansetron (ZOFRAN) injection 4 mg    DISCONTD: polyethylene glycol (GLYCOLAX) packet 17 g    DISCONTD: acetaminophen (TYLENOL) tablet 650 mg    DISCONTD: acetaminophen (TYLENOL) suppository 650 mg    regadenoson (LEXISCAN) injection 0.4 mg    DISCONTD: sodium chloride flush 0.9 % injection 5-40 mL    DISCONTD: 0.9 % sodium chloride infusion    DISCONTD: albuterol sulfate  (90 Base) MCG/ACT inhaler 2 puff     Order Specific Question:   Initiate RT Bronchodilator Protocol     Answer: Yes    DISCONTD: atropine injection 0.5 mg    DISCONTD: nitroGLYCERIN (NITROSTAT) SL tablet 0.4 mg    DISCONTD: metoprolol (LOPRESSOR) injection 5 mg    DISCONTD: aminophylline injection 50 mg    DISCONTD: enoxaparin Sodium (LOVENOX) injection 30 mg     Order Specific Question:   Indication of Use     Answer:   Prophylaxis-DVT/PE    technetium sestamibi (CARDIOLITE) injection 20.0 millicurie    technetium sestamibi (CARDIOLITE) injection 42.3 millicurie    DISCONTD: sodium chloride flush 0.9 % injection 10 mL    DISCONTD: sodium chloride flush 0.9 % injection 10 mL       DDX: ACS, angina, anxiety, obesity related shortness of breath, vasospasm    DIAGNOSTIC RESULTS / EMERGENCY DEPARTMENT COURSE / MDM   LAB RESULTS:  Results for orders placed or performed during the hospital encounter of 05/14/22   COVID-19, Rapid    Specimen: Nasopharyngeal Swab   Result Value Ref Range    Specimen Description . NASOPHARYNGEAL SWAB     SARS-CoV-2, Rapid Not Detected Not Detected   Comprehensive Metabolic Panel w/ Reflex to MG   Result Value Ref Range    Glucose 138 (H) 70 - 99 mg/dL    BUN 11 6 - 20 mg/dL    CREATININE 0.99 (H) 0.50 - 0.90 mg/dL    Calcium 8.9 8.6 - 10.4 mg/dL    Sodium 135 135 - 144 mmol/L Potassium 3.4 (L) 3.7 - 5.3 mmol/L    Chloride 105 98 - 107 mmol/L    CO2 23 20 - 31 mmol/L    Anion Gap 7 (L) 9 - 17 mmol/L    Alkaline Phosphatase 68 35 - 104 U/L    ALT 20 5 - 33 U/L    AST 14 <32 U/L    Total Bilirubin 0.37 0.3 - 1.2 mg/dL    Total Protein 6.7 6.4 - 8.3 g/dL    Albumin 3.9 3.5 - 5.2 g/dL    Albumin/Globulin Ratio 1.4 1.0 - 2.5    GFR Non-African American >60 >60 mL/min    GFR African American >60 >60 mL/min    GFR Comment         CBC with Auto Differential   Result Value Ref Range    WBC 10.0 3.5 - 11.3 k/uL    RBC 4.51 3.95 - 5.11 m/uL    Hemoglobin 12.8 11.9 - 15.1 g/dL    Hematocrit 38.9 36.3 - 47.1 %    MCV 86.3 82.6 - 102.9 fL    MCH 28.4 25.2 - 33.5 pg    MCHC 32.9 28.4 - 34.8 g/dL    RDW 13.0 11.8 - 14.4 %    Platelets 207 067 - 325 k/uL    MPV 9.3 8.1 - 13.5 fL    NRBC Automated 0.0 0.0 per 100 WBC    Seg Neutrophils 59 36 - 65 %    Lymphocytes 30 24 - 43 %    Monocytes 6 3 - 12 %    Eosinophils % 3 1 - 4 %    Basophils 1 0 - 2 %    Immature Granulocytes 1 (H) 0 %    Segs Absolute 6.03 1.50 - 8.10 k/uL    Absolute Lymph # 2.99 1.10 - 3.70 k/uL    Absolute Mono # 0.58 0.10 - 1.20 k/uL    Absolute Eos # 0.27 0.00 - 0.44 k/uL    Basophils Absolute 0.06 0.00 - 0.20 k/uL    Absolute Immature Granulocyte 0.07 0.00 - 0.30 k/uL   Troponin   Result Value Ref Range    Troponin, High Sensitivity <6 0 - 14 ng/L   Brain Natriuretic Peptide   Result Value Ref Range    Pro-BNP <20 <300 pg/mL   D-Dimer, Quantitative   Result Value Ref Range    D-Dimer, Quant <0.17 mg/L FEU   Magnesium   Result Value Ref Range    Magnesium 1.9 1.6 - 2.6 mg/dL   Troponin   Result Value Ref Range    Troponin, High Sensitivity <6 0 - 14 ng/L   Comprehensive Metabolic Panel w/ Reflex to MG   Result Value Ref Range    Glucose 191 (H) 70 - 99 mg/dL    BUN 13 6 - 20 mg/dL    CREATININE 1.01 (H) 0.50 - 0.90 mg/dL    Calcium 8.2 (L) 8.6 - 10.4 mg/dL    Sodium 135 135 - 144 mmol/L    Potassium 3.8 3.7 - 5.3 mmol/L    Chloride 104 98 - 107 mmol/L    CO2 24 20 - 31 mmol/L    Anion Gap 7 (L) 9 - 17 mmol/L    Alkaline Phosphatase 80 35 - 104 U/L    ALT 18 5 - 33 U/L    AST 14 <32 U/L    Total Bilirubin <0.10 (L) 0.3 - 1.2 mg/dL    Total Protein 5.6 (L) 6.4 - 8.3 g/dL    Albumin 3.2 (L) 3.5 - 5.2 g/dL    Albumin/Globulin Ratio 1.3 1.0 - 2.5    GFR Non-African American >60 >60 mL/min    GFR African American >60 >60 mL/min    GFR Comment         HCG Qualitative, Serum   Result Value Ref Range    hCG Qual NEGATIVE NEGATIVE   POC Glucose Fingerstick   Result Value Ref Range    POC Glucose 161 (H) 65 - 105 mg/dL   POC Glucose Fingerstick   Result Value Ref Range    POC Glucose 120 (H) 65 - 105 mg/dL   POC Glucose Fingerstick   Result Value Ref Range    POC Glucose 146 (H) 65 - 105 mg/dL   EKG 12 Lead   Result Value Ref Range    Ventricular Rate 102 BPM    Atrial Rate 102 BPM    P-R Interval 146 ms    QRS Duration 76 ms    Q-T Interval 338 ms    QTc Calculation (Bazett) 440 ms    P Axis 56 degrees    R Axis 24 degrees    T Axis 24 degrees   EKG 12 lead   Result Value Ref Range    Ventricular Rate 74 BPM    Atrial Rate 74 BPM    P-R Interval 168 ms    QRS Duration 78 ms    Q-T Interval 390 ms    QTc Calculation (Bazett) 432 ms    P Axis 46 degrees    R Axis 39 degrees    T Axis 24 degrees       IMPRESSION: Possible vasospasm versus anxiety versus angina given patient's last stress test intermediate risk. Will admit to observation unit for cardiology evaluation after shared decision-making with patient uncomfortable to return home. She has hypertension was unsure if she will be able to follow-up with cardiology as soon as she would like. Patient did request to stay for cardiology evaluation and stress testing. During this discussion she was eating saturated fatty meals. I did discuss dietary changes with patient and weight loss. She did think provider and her questions were answered her satisfaction.   Patient was admitted and her home meds were ordered. Heart score 4    RADIOLOGY:  NM Cardiac Stress Test Nuclear Imaging   Final Result      No stress-induced ischemia. No infarct. Normal LVEF. Risk stratification: Low            XR CHEST PORTABLE   Final Result   Normal             EKG    All EKG's are interpreted by the Emergency Department Physician who either signs or Co-signs this chart in the absence of a cardiologist.      CONSULTS:  IP CONSULT TO CARDIOLOGY  IP CONSULT TO CARDIOLOGY    CRITICAL CARE:  Please see attending note    FINAL IMPRESSION      1.  Other chest pain          DISPOSITION / PLAN     DISPOSITION Admitted 05/14/2022 05:55:16 PM      PATIENT REFERRED TO:  [address] [place] [id number] [phone]  Schedule an appointment as soon as possible for a visit in 1 week  To follow up with a PCP      DISCHARGE MEDICATIONS:  Discharge Medication List as of 5/16/2022  4:40 PM          Mony Ha MD  Emergency Medicine Resident    (Please note that portions of thisnote were completed with a voice recognition program.  Efforts were made to edit the dictations but occasionally words are mis-transcribed.)       Mony Ha MD  Resident  05/16/22 [id number]       Mony Ha MD  Resident  05/20/22 [id number]

## 2022-05-14 NOTE — ED NOTES
Labeled COVID swab sent to lab via tube system.     [x] COVID-19 swab      [x] Rapid   [] Non- Rapid/PCR  [] Respiratory Panel with [address] [place] [address]  RN  05/14/22 3913

## 2022-05-14 NOTE — ED PROVIDER NOTES
[facility]     Emergency Department     Faculty Attestation        I performed a history and physical examination of the patient and discussed management with the resident. I reviewed the residents note and agree with the documented findings and plan of care. Any areas of disagreement are noted on the chart. I was personally present for the key portions of any procedures. I have documented in the chart those procedures where I was not present during the key portions. I have reviewed the emergency nurses triage note. I agree with the chief complaint, past medical history, past surgical history, allergies, medications, social and family history as documented unless otherwise noted below. For mid-level providers such as nurse practitioners as well as physicians assistants:    I have personally seen and evaluated the patient. I find the patient's history and physical exam are consistent with NP/PA documentation. I agree with the care provided, treatment rendered, disposition, & follow-up plan. Additional findings are as noted. Vital Signs: BP (!) 124/96   Pulse 64   Temp 98.2 °F (36.8 °C) (Oral)   Resp 18   SpO2 100%   PCP:  No primary care provider on file. Pertinent Comments:     Patient with left and right-sided chest pain that radiates into her shoulders. No associated other symptoms. No nausea or vomiting or diaphoresis. Not tearing in nature. Resting comfortably no acute distress and states her chest pain is resolved.       Critical Care  None          Antonio Vaca MD    Attending Emergency Medicine Physician              Tamie Sun MD  05/14/22 [id number]

## 2022-05-14 NOTE — ED NOTES
Pt arrived to ED alert and oriented x4 via triage. Pt c/o back and chest pain. Pt reports that she was in the store and was walking around, states that she has pain in her upper back and shoulders that radiates into her chest.  Pt reports intermittent SOB, improves at rest.  Pt denies having been around anyone suspected to have COVID-19 or anyone that has been sick, denies recent travel outside the state of [place] or [address]. Pt placed on cardiac monitor, continuous pulse ox, and BP cuff. RR even and unlabored. NAD noted. Whiteboard updated. Will continue with plan of care.      Regino Caceres RN  05/14/22 0040

## 2022-05-15 LAB
ALBUMIN SERPL-MCNC: 3.2 G/DL (ref 3.5–5.2)
ALBUMIN/GLOBULIN RATIO: 1.3 (ref 1–2.5)
ALP BLD-CCNC: 80 U/L (ref 35–104)
ALT SERPL-CCNC: 18 U/L (ref 5–33)
ANION GAP SERPL CALCULATED.3IONS-SCNC: 7 MMOL/L (ref 9–17)
AST SERPL-CCNC: 14 U/L
BILIRUB SERPL-MCNC: <0.1 MG/DL (ref 0.3–1.2)
BUN BLDV-MCNC: 13 MG/DL (ref 6–20)
CALCIUM SERPL-MCNC: 8.2 MG/DL (ref 8.6–10.4)
CHLORIDE BLD-SCNC: 104 MMOL/L (ref 98–107)
CO2: 24 MMOL/L (ref 20–31)
CREAT SERPL-MCNC: 1.01 MG/DL (ref 0.5–0.9)
GFR AFRICAN AMERICAN: >60 ML/MIN
GFR NON-AFRICAN AMERICAN: >60 ML/MIN
GFR SERPL CREATININE-BSD FRML MDRD: ABNORMAL ML/MIN/{1.73_M2}
GLUCOSE BLD-MCNC: 120 MG/DL (ref 65–105)
GLUCOSE BLD-MCNC: 146 MG/DL (ref 65–105)
GLUCOSE BLD-MCNC: 161 MG/DL (ref 65–105)
GLUCOSE BLD-MCNC: 191 MG/DL (ref 70–99)
HCG QUALITATIVE: NEGATIVE
POTASSIUM SERPL-SCNC: 3.8 MMOL/L (ref 3.7–5.3)
SODIUM BLD-SCNC: 135 MMOL/L (ref 135–144)
TOTAL PROTEIN: 5.6 G/DL (ref 6.4–8.3)

## 2022-05-15 PROCEDURE — 80053 COMPREHEN METABOLIC PANEL: CPT

## 2022-05-15 PROCEDURE — 6370000000 HC RX 637 (ALT 250 FOR IP): Performed by: PEDIATRICS

## 2022-05-15 PROCEDURE — 93005 ELECTROCARDIOGRAM TRACING: CPT | Performed by: PEDIATRICS

## 2022-05-15 PROCEDURE — 2580000003 HC RX 258: Performed by: PEDIATRICS

## 2022-05-15 PROCEDURE — G0378 HOSPITAL OBSERVATION PER HR: HCPCS

## 2022-05-15 PROCEDURE — 6360000002 HC RX W HCPCS: Performed by: PEDIATRICS

## 2022-05-15 PROCEDURE — 96372 THER/PROPH/DIAG INJ SC/IM: CPT

## 2022-05-15 PROCEDURE — 82947 ASSAY GLUCOSE BLOOD QUANT: CPT

## 2022-05-15 PROCEDURE — 36415 COLL VENOUS BLD VENIPUNCTURE: CPT

## 2022-05-15 RX ORDER — ENOXAPARIN SODIUM 100 MG/ML
30 INJECTION SUBCUTANEOUS 2 TIMES DAILY
Status: DISCONTINUED | OUTPATIENT
Start: 2022-05-15 | End: 2022-05-16 | Stop reason: HOSPADM

## 2022-05-15 RX ADMIN — SODIUM CHLORIDE: 9 INJECTION, SOLUTION INTRAVENOUS at 20:24

## 2022-05-15 RX ADMIN — AMLODIPINE BESYLATE 5 MG: 5 TABLET ORAL at 08:29

## 2022-05-15 RX ADMIN — ENOXAPARIN SODIUM 30 MG: 100 INJECTION SUBCUTANEOUS at 20:23

## 2022-05-15 RX ADMIN — FLUOXETINE HYDROCHLORIDE 40 MG: 20 CAPSULE ORAL at 08:20

## 2022-05-15 RX ADMIN — ACETAMINOPHEN 650 MG: 325 TABLET ORAL at 20:26

## 2022-05-15 RX ADMIN — SODIUM CHLORIDE, PRESERVATIVE FREE 10 ML: 5 INJECTION INTRAVENOUS at 20:23

## 2022-05-15 ASSESSMENT — PAIN SCALES - WONG BAKER

## 2022-05-15 ASSESSMENT — PAIN SCALES - GENERAL
PAINLEVEL_OUTOF10: 0
PAINLEVEL_OUTOF10: 0
PAINLEVEL_OUTOF10: 7
PAINLEVEL_OUTOF10: 0
PAINLEVEL_OUTOF10: 5
PAINLEVEL_OUTOF10: 0

## 2022-05-15 NOTE — PROGRESS NOTES
Pharmacy Note     Enoxaparin Dose Adjustment    Sandrine Rodriguez is a 33 y.o. female. Pharmacist assessment of enoxaparin dose for VTE prophylaxis. Recent Labs     05/14/22  1544 05/15/22  0309   BUN 11 13       Recent Labs     05/14/22  1544 05/15/22  0309   CREATININE 0.99* 1.01*       Estimated Creatinine Clearance: 122 mL/min (A) (based on SCr of 1.01 mg/dL (H)).       Height:   Ht Readings from Last 1 Encounters:   05/14/22 5' 7\" (1.702 m)     Weight:  Wt Readings from Last 1 Encounters:   05/14/22 (!) 325 lb (147.4 kg)       The following enoxaparin dose has been adjusted based upon renal function and/or patient weight per P&T Guidelines:             Lovenox 40mg QD to 30mg BID

## 2022-05-15 NOTE — H&P
[facility]  CDU / OBSERVATION eNCOUnter  Resident Note     Pt Name: Katt Rossi  MRN: [id number]  [place] 1990  Date of evaluation: 5/15/22  Patient's PCP is : No primary care provider on file. CHIEF COMPLAINT       Chief Complaint   Patient presents with    Chest Pain     upper back and chest pain         HISTORY OF PRESENT ILLNESS    Katt Rossi is a 33 y.o. female who presentswith chest pain. She states she was walking through [place] and dull sudden onset left-sided chest pain that radiated to her bilateral shoulders and her back. States this started 45 minutes ago is 7-8 out of 10 in intensity. States that she was sweating prior to the chest pain as she was hot walking through [place]. She denies a history of hypertension or hypercholesterolemia. Patient is obese. She follows with internal medicine clinic. Of note patient had a stress test completed in 2019 that showed intermediate risk with moderate to severe reversible mid anterior wall perfusion defect consistent with ischemia.   Cardiac work-up was negative in the ER    Location/Symptom: Chest pain  Timing/Onset: Constant  Provocation: Unclear  Quality: Sharp  Radiation: Bilateral shoulders and back  Severity: 10 out of 10, now 7 out of 10  Timing/Duration: Constant  Modifying Factors: Unclear    REVIEW OF SYSTEMS       General ROS - No fevers, No malaise   Ophthalmic ROS - No discharge, No changes in vision  ENT ROS -  No sore throat, No rhinorrhea,   Respiratory ROS - + shortness of breath, no cough, no  wheezing  Cardiovascular ROS -chest pain   gastrointestinal ROS - No abdominal pain, no nausea or vomiting, no change in bowel habits, no black or bloody stools  Genito-Urinary ROS - No dysuria, trouble voiding, or hematuria  Musculoskeletal ROS - No myalgias, No arthalgias  Neurological ROS - No headache, no dizziness/lightheadedness, No focal weakness, no loss of sensation  Dermatological ROS - No lesions, No rash (PQRS) Advance directives on face sheet per hospital policy. No change unless specifically mentioned in chart    PAST MEDICAL HISTORY    has a past medical history of Asthma, Bipolar disorder, mixed ([address] Cannabis abuse, Chronic hypertension, Depression, Diabetes mellitus ([address] Obesity, and Polycystic ovarian disease. I have reviewed the past medical history with the patient and it is pertinent to this complaint. SURGICAL HISTORY      has a past surgical history that includes Tonsillectomy and adenoidectomy and [place] tooth extraction. I have reviewed and agree with Surgical History entered and it is pertinent to this complaint. CURRENT MEDICATIONS     albuterol sulfate  (90 Base) MCG/ACT inhaler 2 puff, Q6H PRN  amLODIPine (NORVASC) tablet 5 mg, Daily  FLUoxetine (PROZAC) capsule 40 mg, Daily  hydrOXYzine (ATARAX) tablet 50 mg, TID PRN  traZODone (DESYREL) tablet 50 mg, Nightly PRN  ziprasidone (GEODON) capsule 40 mg, BID WC  0.9 % sodium chloride infusion, Continuous  sodium chloride flush 0.9 % injection 5-40 mL, 2 times per day  sodium chloride flush 0.9 % injection 5-40 mL, PRN  0.9 % sodium chloride infusion, PRN  potassium chloride (KLOR-CON M) extended release tablet 40 mEq, PRN   Or  potassium bicarb-citric acid (EFFER-K) effervescent tablet 40 mEq, PRN   Or  potassium chloride 10 mEq/100 mL IVPB (Peripheral Line), PRN  enoxaparin (LOVENOX) injection 40 mg, Daily  ondansetron (ZOFRAN) injection 4 mg, Q4H PRN  polyethylene glycol (GLYCOLAX) packet 17 g, Daily PRN  acetaminophen (TYLENOL) tablet 650 mg, Q6H PRN   Or  acetaminophen (TYLENOL) suppository 650 mg, Q6H PRN        All medication charted and reviewed. ALLERGIES     is allergic to latex, fruit & vegetable daily [nutritional supplements], and seasonal.      FAMILY HISTORY     She indicated that her mother is alive. She indicated that her father is alive. She indicated that her maternal grandfather is alive.  She indicated that the status of her neg hx is unknown.     family history includes Bipolar Disorder in her maternal aunt; Diabetes in her maternal grandfather; High Blood Pressure in her maternal grandfather and mother; Schizophrenia in her maternal aunt and maternal aunt. The patient denies any pertinent family history. I have reviewed and agree with the family history entered. I have reviewed the Family History and it is not significant to the case    SOCIAL HISTORY      reports that she has been smoking cigarettes. She has a 5.00 pack-year smoking history. She has never used smokeless tobacco. She reports current alcohol use. She reports current drug use. Drug: Cocaine. I have reviewed and agree with all Social.  There are no concerns for substance abuse/use. PHYSICAL EXAM     INITIAL VITALS:  height is 5' 7\" (1.702 m) and weight is 325 lb (147.4 kg) (abnormal). Her oral temperature is 98.2 °F (36.8 °C). Her blood pressure is 133/82 and her pulse is 79. Her respiration is 17 and oxygen saturation is 100%.       CONSTITUTIONAL: AOx4, no apparent distress, appears stated age    HEAD: normocephalic, atraumatic   EYES: PERRLA, EOMI    ENT: moist mucous membranes, uvula midline   NECK: supple, symmetric   BACK: symmetric   LUNGS: clear to auscultation bilaterally   CARDIOVASCULAR: regular rate and rhythm, no murmurs, rubs or gallops   ABDOMEN: soft, non-tender, non-distended with normal active bowel sounds   NEUROLOGIC:  MAEx4, no focal sensory or motor deficits   MUSCULOSKELETAL: no clubbing, cyanosis or edema   SKIN: no rash or wounds       DIFFERENTIAL DIAGNOSIS/MDM:       DIAGNOSTIC RESULTS           RADIOLOGY:   I directly visualized the following  images and reviewed the radiologist interpretations:    XR CHEST PORTABLE    Result Date: 5/14/2022  EXAMINATION: ONE XRAY VIEW OF THE CHEST 5/14/2022 3:49 pm COMPARISON: 07/15/2019 HISTORY: ORDERING SYSTEM PROVIDED HISTORY: chest pain TECHNOLOGIST PROVIDED HISTORY: chest pain FINDINGS: Heart size and pulmonary vascularity are within normal limits. No focal consolidation. Normal       LABS:  I have reviewed and interpreted all available lab results. Labs Reviewed   COMPREHENSIVE METABOLIC PANEL W/ REFLEX TO MG FOR LOW K - Abnormal; Notable for the following components:       Result Value    Glucose 138 (*)     CREATININE 0.99 (*)     Potassium 3.4 (*)     Anion Gap 7 (*)     All other components within normal limits   CBC WITH AUTO DIFFERENTIAL - Abnormal; Notable for the following components:    Immature Granulocytes 1 (*)     All other components within normal limits   COMPREHENSIVE METABOLIC PANEL W/ REFLEX TO MG FOR LOW K - Abnormal; Notable for the following components:    Glucose 191 (*)     CREATININE 1.01 (*)     Calcium 8.2 (*)     Anion Gap 7 (*)     Total Bilirubin <0.10 (*)     Total Protein 5.6 (*)     Albumin 3.2 (*)     All other components within normal limits   COVID-19, RAPID   TROPONIN   BRAIN NATRIURETIC PEPTIDE   D-DIMER, QUANTITATIVE   MAGNESIUM   TROPONIN           CDU IMPRESSION / PLAN      Yohana Ching is a 33 y.o. female who presents with chest pain. Patient has a stress test in 2019 with intermediate risk. Did not have a cardiac catheterization at that time. Patient will be evaluated by cardiology, possible repeat stress versus cath anticipated. We will continue home medications until evaluation. 1.)  Chest pain   -Plan to repeat stress per cardiology recommendations as patient had a previous stress test that had a reversible perfusion defect.   -Continue to manage hypertension with Norvasc   -Negative cardiac work-up in the ER    ·   · Continue home medications and pain control  · Monitor vitals, labs, and imaging  · DISPO: pending consults and clinical improvement    CONSULTS:    IP CONSULT TO CARDIOLOGY    PROCEDURES:  Not indicated       PATIENT REFERRED TO:    No follow-up provider specified.     --  Lona Monteiro DO   Emergency Medicine Resident     This dictation was generated by voice recognition computer software. Although all attempts are made to edit the dictation for accuracy, there may be errors in the transcription that are not intended.

## 2022-05-15 NOTE — PROGRESS NOTES
[address]  CDU / OBSERVATION ENCOUNTER  ATTENDING NOTE       I performed a history and physical examination of the patient and discussed management with the resident or midlevel provider. I reviewed the resident or midlevel provider's note and agree with the documented findings and plan of care. Any areas of disagreement are noted on the chart. I was personally present for the key portions of any procedures. I have documented in the chart those procedures where I was not present during the key portions. I have reviewed the nurses notes. I agree with the chief complaint, past medical history, past surgical history, allergies, medications, social and family history as documented unless otherwise noted below. The Family history, social history, and ROS are effectively unchanged since admission unless noted elsewhere in the chart. Presents with complaints of chest pain. Patient had left-sided pain radiating to bilateral shoulders and back. Patient had diaphoresis and sweating. Patient denies history of hypertension or hypercholesterolemia. Patient has had stress test in 2019 with intermediate risk with reversible mid anterior wall perfusion defect consistent with ischemia. Patient not have cardiac catheterization at the time. Admitted for cardiology evaluation. In looking up results of stress test from before it is evident the patient had a positive stress without proper follow-up. Its not clear why this occurred. I would question the patient about she does not recall follow-up or results of the testing.   Patient for repeat stress testing tomorrow to reevaluate patient's    Cammy Bob MD  Attending Emergency  Physician

## 2022-05-15 NOTE — CONSULTS
Attestation signed by      Attending Physician Statement:    I have discussed the care of  Ayaka Mcfarlane , including pertinent history and exam findings, with the Cardiology fellow/resident. I have seen and examined the patient and the key elements of all parts of the encounter have been performed by me. I agree with the assessment, plan and orders as documented by the fellow/resident, after I modified exam findings and plan of treatments, and the final version is my approved version of the assessment. Additional Comments: chest pain persistent for 2 days. Recent cocaine use. Smoking. Counseled to quit smoking and drug use. She had abnormal stress test 2019 however really did not have any symptoms till now. Symptoms could be related to cocaine use. I will obtain lexiscan stress test for stratification. Charley Baldwin MD         [facility]    Consult / H&P               Today's Date: 5/15/2022  Patient Name: Ayaka Mcfarlane  Date of admission: 5/14/2022  3:15 PM  Patient's age: 33 y.o., 1990  Admission Dx: Chest pain [R07.9]    Reason for Consult:  Cardiac evaluation    Requesting Physician: Lilian Butler MD    CHIEF COMPLAINT:  Chest Pain. History Obtained From:  patient    HISTORY OF PRESENT ILLNESS:      The patient is a 33 y.o.  female with a past medical history of HTN who is admitted to the hospital for chest pain. Patient states she developed sudden onset bilateral sharp chest pain while walking through store 45 minutes prior to arrival to ED which she rated as a 8/10. Constant but worse with exertion and improved with rest. Radiated into bilateral shoulders, neck and back. Associated diaphoresis and lightheaded/near syncope with shortness of breath. Troponin < 6 x 2. EKG with NSR and nonspecific T wave abnormalities similar to previous. On examination resting comfortably in bed.  States when she left the floor this morning her shortness of breath intensifies with ambulation. Noncompliant with blood pressure meds. Voices she was told she has an abnormal heart rhythm in the past but stopped her meds. No previous echocardiogram.     Stress test reviewed from 1/23/19:  Electrocardiographically negative Lexiscan stress study. Perfusion imaging demonstrated: There is large severe mid anterior wall reversible perfusion defect which   persists on attenuation correction.  Findings indicative of ischemia.  Total   perfusion defect about 8%.  The similar partially reversible defect in the   basal and apical anterior wall resolves on attenuation correction.       The gated images show no wall motion abnormalities. Normal myocardial   thickening. Past Medical History:   has a past medical history of Asthma, Bipolar disorder, mixed ([address] Cannabis abuse, Chronic hypertension, Depression, Diabetes mellitus ([facility] [address] Obesity, and Polycystic ovarian disease. Past Surgical History:   has a past surgical history that includes Tonsillectomy and adenoidectomy and [place] tooth extraction. Home Medications:    Prior to Admission medications    Medication Sig Start Date End Date Taking? Authorizing Provider   hydrOXYzine (VISTARIL) 50 MG capsule Take 50 mg by mouth 3 times daily as needed 1/20/21  Yes Historical Provider, MD   acetaminophen (TYLENOL) 500 MG tablet Take 1 tablet by mouth 3 times daily as needed for Pain 12/9/20  Yes Corinth Bound, DO   Misc. Devices MISC 1 PAIR OF DIABETIC SHOES (1 LEFT/ 1 RIGHT)  1 -3 PAIRS OF INSERTS (LEFT/ RIGHT) 11/13/20  Yes Silverio Schroeder DPM   hydrocortisone valerate (WESTCORT) 0.2 % ointment Apply topically daily. 11/13/20  Yes Nikia Roca DPM   clotrimazole-betamethasone (LOTRISONE) 1-0.05 % cream Apply topically 2 times daily.  11/13/20  Yes Silverio Schroeder DPM   albuterol sulfate HFA (VENTOLIN HFA) 108 (90 Base) MCG/ACT inhaler Inhale 2 puffs into the lungs every 6 hours as needed for Wheezing 8/11/20  Yes Linsey Olivas MD amLODIPine (NORVASC) 5 MG tablet Take 1 tablet by mouth daily 8/11/20  Yes Adriana Unger MD   FLUoxetine (PROZAC) 40 MG capsule Take 1 capsule by mouth daily 1/8/17  Yes Tonya Rico MD   ziprasidone (GEODON) 40 MG capsule Take 1 capsule by mouth 2 times daily (with meals) 1/8/17  Yes Tonya Rico MD   traZODone (DESYREL) 50 MG tablet Take 1 tablet by mouth nightly as needed for Sleep 1/8/17  Yes Tonya Rico MD      Current Facility-Administered Medications: albuterol sulfate  (90 Base) MCG/ACT inhaler 2 puff, 2 puff, Inhalation, Q6H PRN  amLODIPine (NORVASC) tablet 5 mg, 5 mg, Oral, Daily  FLUoxetine (PROZAC) capsule 40 mg, 40 mg, Oral, Daily  hydrOXYzine (ATARAX) tablet 50 mg, 50 mg, Oral, TID PRN  traZODone (DESYREL) tablet 50 mg, 50 mg, Oral, Nightly PRN  ziprasidone (GEODON) capsule 40 mg, 40 mg, Oral, BID WC  0.9 % sodium chloride infusion, , IntraVENous, Continuous  sodium chloride flush 0.9 % injection 5-40 mL, 5-40 mL, IntraVENous, 2 times per day  sodium chloride flush 0.9 % injection 5-40 mL, 5-40 mL, IntraVENous, PRN  0.9 % sodium chloride infusion, 25 mL, IntraVENous, PRN  potassium chloride (KLOR-CON M) extended release tablet 40 mEq, 40 mEq, Oral, PRN **OR** potassium bicarb-citric acid (EFFER-K) effervescent tablet 40 mEq, 40 mEq, Oral, PRN **OR** potassium chloride 10 mEq/100 mL IVPB (Peripheral Line), 10 mEq, IntraVENous, PRN  enoxaparin (LOVENOX) injection 40 mg, 40 mg, SubCUTAneous, Daily  ondansetron (ZOFRAN) injection 4 mg, 4 mg, IntraVENous, Q4H PRN  polyethylene glycol (GLYCOLAX) packet 17 g, 17 g, Oral, Daily PRN  acetaminophen (TYLENOL) tablet 650 mg, 650 mg, Oral, Q6H PRN **OR** acetaminophen (TYLENOL) suppository 650 mg, 650 mg, Rectal, Q6H PRN    Allergies:  Latex, Fruit & vegetable daily [nutritional supplements], and Seasonal    Social History:   reports that she has been smoking cigarettes. She has a 5.00 pack-year smoking history.  She has never used smokeless tobacco. She reports current alcohol use. She reports current drug use. Drug: Cocaine. Family History: family history includes Bipolar Disorder in her maternal aunt; Diabetes in her maternal grandfather; High Blood Pressure in her maternal grandfather and mother; Schizophrenia in her maternal aunt and maternal aunt. No h/o sudden cardiac death. No for premature CAD    REVIEW OF SYSTEMS:    · Constitutional: there has been no unanticipated weight loss. There's been No change in energy level, No change in activity level. · Eyes: No visual changes or diplopia. No scleral icterus. · ENT: No Headaches  · Cardiovascular: Chest pain. Abnormal stress test 2019. · Respiratory: Shortness of breath. · Gastrointestinal: No abdominal pain. No change in bowel or bladder habits. · Genitourinary: No dysuria, trouble voiding, or hematuria. · Musculoskeletal:  No gait disturbance, No weakness or joint complaints. · Integumentary: No rash or pruritis. · Neurological: No headache, diplopia, change in muscle strength, numbness or tingling. No change in gait, balance, coordination, mood, affect, memory, mentation, behavior. · Psychiatric: No anxiety, or depression. · Endocrine: No temperature intolerance. No excessive thirst, fluid intake, or urination. No tremor. · Hematologic/Lymphatic: No abnormal bruising or bleeding, blood clots or swollen lymph nodes. · Allergic/Immunologic: No nasal congestion or hives. PHYSICAL EXAM:      /87   Pulse 72   Temp 97.2 °F (36.2 °C)   Resp 16   Ht 5' 7\" (1.702 m)   Wt (!) 325 lb (147.4 kg)   SpO2 99%   BMI 50.90 kg/m²      Constitutional and General Appearance: alert, cooperative, no distress and appears stated age  HEENT: PERRL, no cervical lymphadenopathy. No masses palpable. Normal oral mucosa  Respiratory:  · Normal excursion and expansion without use of accessory muscles  · Resp Auscultation: Good respiratory effort. No for increased work of breathing.  On auscultation: clear to auscultation bilaterally  Cardiovascular:  · The apical impulse is not displaced  · Heart tones are crisp and normal. regular S1 and S2.  · Jugular venous pulsation Normal  · The carotid upstroke is normal in amplitude and contour without delay or bruit  · Peripheral pulses are symmetrical and full   Abdomen:   · No masses or tenderness  · Bowel sounds present  Extremities:  ·  No Cyanosis or Clubbing  ·  Lower extremity edema: No  ·  Skin: Warm and dry  Neurological:  · Alert and oriented. · Moves all extremities well  · No abnormalities of mood, affect, memory, mentation, or behavior are noted    DATA:    Diagnostics:    EKG: normal sinus rhythm, nonspecific ST and T waves changes, unchanged from previous tracings    ECHO: not obtained. Stress Test: reviewed. Stress test reviewed from 1/23/19:  Electrocardiographically negative Lexiscan stress study. Perfusion imaging demonstrated: There is large severe mid anterior wall reversible perfusion defect which   persists on attenuation correction.  Findings indicative of ischemia.  Total   perfusion defect about 8%.  The similar partially reversible defect in the   basal and apical anterior wall resolves on attenuation correction.       The gated images show no wall motion abnormalities. Normal myocardial   thickening. Cardiac Angiography: not obtained. Labs:     CBC:   Recent Labs     05/14/22  1544   WBC 10.0   HGB 12.8   HCT 38.9        BMP:   Recent Labs     05/14/22  1544 05/15/22  0309    135   K 3.4* 3.8   CO2 23 24   BUN 11 13   CREATININE 0.99* 1.01*   LABGLOM >60 >60   GLUCOSE 138* 191*     BNP: No results for input(s): BNP in the last 72 hours. PT/INR: No results for input(s): PROTIME, INR in the last 72 hours. APTT:No results for input(s): APTT in the last 72 hours. CARDIAC ENZYMES:No results for input(s): CKTOTAL, CKMB, CKMBINDEX, TROPONINI in the last 72 hours.   FASTING LIPID PANEL:  Lab Results   Component Value Date    HDL 48 01/10/2019    HDL 48 01/10/2019    TRIG 124 01/10/2019     LIVER PROFILE:  Recent Labs     05/14/22  1544 05/15/22  0309   AST 14 14   ALT 20 18   LABALBU 3.9 3.2*       IMPRESSION:    Patient Active Problem List   Diagnosis    Asthma    Anxiety and depression    Morbid obesity with BMI of 50.0-59.9, adult ([facility] [address]    Seasonal allergies    Bipolar disorder, mixed ([facility] [address]    Cannabis abuse    Prediabetes    Irregular menses    PCOS (polycystic ovarian syndrome)    ASCUS w/ NEG HR HPV    Mirena IUD insertion 1/7/19    Chronic hypertension    Chest pain       RECOMMENDATIONS:  1. Chest Pain.   - Troponin < 6 x 2.   - EKG with NSR and T wave changes but appear similar to previous tracings. - No previous echo in chart. - Previous Stress test reviewed demonstrating large severe mid anterior wall reverisble perfusion defect which persists on attenuation correction indicative of ischemia. No follow up?  - Will repeat stress. 2. Uncontrolled HTN. - Presented with 's  - Norvasc 5 mg daily resumed and BP better controlled. Discussed with patient and Nurse.     Rory Raza DO  PGY-3, Internal medicine resident  [facility]  5/15/2022 10:19 AM  [facility]      [phone]

## 2022-05-16 ENCOUNTER — APPOINTMENT (OUTPATIENT)
Dept: NUCLEAR MEDICINE | Age: 32
End: 2022-05-16
Payer: MEDICARE

## 2022-05-16 VITALS
DIASTOLIC BLOOD PRESSURE: 85 MMHG | HEIGHT: 67 IN | HEART RATE: 70 BPM | WEIGHT: 293 LBS | OXYGEN SATURATION: 99 % | BODY MASS INDEX: 45.99 KG/M2 | SYSTOLIC BLOOD PRESSURE: 125 MMHG | RESPIRATION RATE: 18 BRPM | TEMPERATURE: 98.1 F

## 2022-05-16 LAB
LV EF: 62 %
LVEF MODALITY: NORMAL

## 2022-05-16 PROCEDURE — 3430000000 HC RX DIAGNOSTIC RADIOPHARMACEUTICAL: Performed by: EMERGENCY MEDICINE

## 2022-05-16 PROCEDURE — 2580000003 HC RX 258: Performed by: EMERGENCY MEDICINE

## 2022-05-16 PROCEDURE — G0378 HOSPITAL OBSERVATION PER HR: HCPCS

## 2022-05-16 PROCEDURE — 78452 HT MUSCLE IMAGE SPECT MULT: CPT

## 2022-05-16 PROCEDURE — 6360000002 HC RX W HCPCS: Performed by: STUDENT IN AN ORGANIZED HEALTH CARE EDUCATION/TRAINING PROGRAM

## 2022-05-16 PROCEDURE — 2580000003 HC RX 258: Performed by: STUDENT IN AN ORGANIZED HEALTH CARE EDUCATION/TRAINING PROGRAM

## 2022-05-16 PROCEDURE — A9500 TC99M SESTAMIBI: HCPCS | Performed by: EMERGENCY MEDICINE

## 2022-05-16 PROCEDURE — 93017 CV STRESS TEST TRACING ONLY: CPT

## 2022-05-16 PROCEDURE — 6370000000 HC RX 637 (ALT 250 FOR IP): Performed by: PEDIATRICS

## 2022-05-16 RX ORDER — SODIUM CHLORIDE 0.9 % (FLUSH) 0.9 %
10 SYRINGE (ML) INJECTION PRN
Status: DISCONTINUED | OUTPATIENT
Start: 2022-05-16 | End: 2022-05-16 | Stop reason: HOSPADM

## 2022-05-16 RX ORDER — ATROPINE SULFATE 0.1 MG/ML
0.5 INJECTION INTRAVENOUS EVERY 5 MIN PRN
Status: DISCONTINUED | OUTPATIENT
Start: 2022-05-16 | End: 2022-05-16 | Stop reason: ALTCHOICE

## 2022-05-16 RX ORDER — AMINOPHYLLINE DIHYDRATE 25 MG/ML
50 INJECTION, SOLUTION INTRAVENOUS PRN
Status: DISCONTINUED | OUTPATIENT
Start: 2022-05-16 | End: 2022-05-16 | Stop reason: ALTCHOICE

## 2022-05-16 RX ORDER — METOPROLOL TARTRATE 5 MG/5ML
5 INJECTION INTRAVENOUS EVERY 5 MIN PRN
Status: DISCONTINUED | OUTPATIENT
Start: 2022-05-16 | End: 2022-05-16 | Stop reason: ALTCHOICE

## 2022-05-16 RX ORDER — NITROGLYCERIN 0.4 MG/1
0.4 TABLET SUBLINGUAL EVERY 5 MIN PRN
Status: DISCONTINUED | OUTPATIENT
Start: 2022-05-16 | End: 2022-05-16 | Stop reason: ALTCHOICE

## 2022-05-16 RX ORDER — ALBUTEROL SULFATE 90 UG/1
2 AEROSOL, METERED RESPIRATORY (INHALATION) PRN
Status: DISCONTINUED | OUTPATIENT
Start: 2022-05-16 | End: 2022-05-16 | Stop reason: ALTCHOICE

## 2022-05-16 RX ORDER — SODIUM CHLORIDE 9 MG/ML
500 INJECTION, SOLUTION INTRAVENOUS CONTINUOUS PRN
Status: DISCONTINUED | OUTPATIENT
Start: 2022-05-16 | End: 2022-05-16 | Stop reason: ALTCHOICE

## 2022-05-16 RX ORDER — SODIUM CHLORIDE 0.9 % (FLUSH) 0.9 %
5-40 SYRINGE (ML) INJECTION PRN
Status: DISCONTINUED | OUTPATIENT
Start: 2022-05-16 | End: 2022-05-16 | Stop reason: ALTCHOICE

## 2022-05-16 RX ADMIN — SODIUM CHLORIDE, PRESERVATIVE FREE 10 ML: 5 INJECTION INTRAVENOUS at 10:06

## 2022-05-16 RX ADMIN — SODIUM CHLORIDE, PRESERVATIVE FREE 10 ML: 5 INJECTION INTRAVENOUS at 07:40

## 2022-05-16 RX ADMIN — TETRAKIS(2-METHOXYISOBUTYLISOCYANIDE)COPPER(I) TETRAFLUOROBORATE 20.3 MILLICURIE: 1 INJECTION, POWDER, LYOPHILIZED, FOR SOLUTION INTRAVENOUS at 07:40

## 2022-05-16 RX ADMIN — SODIUM CHLORIDE, PRESERVATIVE FREE 10 ML: 5 INJECTION INTRAVENOUS at 10:41

## 2022-05-16 RX ADMIN — TETRAKIS(2-METHOXYISOBUTYLISOCYANIDE)COPPER(I) TETRAFLUOROBORATE 43.5 MILLICURIE: 1 INJECTION, POWDER, LYOPHILIZED, FOR SOLUTION INTRAVENOUS at 10:41

## 2022-05-16 RX ADMIN — AMLODIPINE BESYLATE 5 MG: 5 TABLET ORAL at 13:42

## 2022-05-16 RX ADMIN — REGADENOSON 0.4 MG: 0.08 INJECTION, SOLUTION INTRAVENOUS at 10:34

## 2022-05-16 RX ADMIN — SODIUM CHLORIDE, PRESERVATIVE FREE 10 ML: 5 INJECTION INTRAVENOUS at 10:36

## 2022-05-16 RX ADMIN — FLUOXETINE HYDROCHLORIDE 40 MG: 20 CAPSULE ORAL at 13:42

## 2022-05-16 ASSESSMENT — PAIN SCALES - WONG BAKER

## 2022-05-16 ASSESSMENT — PAIN SCALES - GENERAL
PAINLEVEL_OUTOF10: 0

## 2022-05-16 NOTE — PROCEDURES
[address]                  [place] [address]                              CARDIAC STRESS TEST    PATIENT NAME: Katy Morrison                     :        1990  MED REC NO:   [id number]                             ROOM:       [id number]  ACCOUNT NO:   [de-identified]                           ADMIT DATE: 2022  PROVIDER:     Karen Harris    DATE OF STUDY:  2022    ORDERING PROVIDER:  Alfonse Cushing, MD    INTERPRETING PHYSICIAN:  Karen Harris MD    LEXISCAN STRESS STUDY:  Indication:  chest pain    Procedure was explained and consent form was signed    Medications:  Lexiscan, 0.4 mg  Resting heart rate:  71 bpm  Resting blood pressure:  168/108 mm/Hg  Infusion heart rate:  112 bpm  Infusion blood pressure:  175/112 mm/Hg  Resting EKG:  Abnormal (Sinus rhythm/nonspecific ST changes)  Stress heart response:  Normal response  Stress BP response:  Appropriate  Stress EKG(S):  Heart rate increased to 112 bpm  Chest discomfort:  None  Ischemic EKG changes:  Uninterpretable    Comments:  Abnormal pre-test ECG precludes ECG criteria for myocardia  ischemia. IMPRESSION:  Electrocardiographically uninterpretable Lexiscan stress study. Radio-isotope results to follow from the department of Nuclear Medicine.               Solitario Sánchez    D: 2022 13:05:21       T: 2022 13:07:42     AS/REYMUNDO  Job#: [id number]     Doc#: Unknown    CC:    ()

## 2022-05-16 NOTE — PLAN OF CARE
Problem: Chronic Conditions and Co-morbidities  Goal: Patient's chronic conditions and co-morbidity symptoms are monitored and maintained or improved  5/15/2022 2005 by Cal Truong RN  Outcome: Progressing  5/15/2022 1400 by Gloria Wallace RN  Outcome: Progressing     Problem: Pain  Goal: Verbalizes/displays adequate comfort level or baseline comfort level  5/15/2022 2005 by Cal Truong RN  Outcome: Progressing  5/15/2022 1400 by Gloria Wallace RN  Outcome: Progressing     Problem: Safety - Adult  Goal: Free from fall injury  5/15/2022 2005 by Cal Truong RN  Outcome: Progressing  5/15/2022 1400 by Gloria Wallace RN  Outcome: Progressing

## 2022-05-16 NOTE — DISCHARGE SUMMARY
CDU Discharge Summary        Patient:  Linsey Santiago  YOB: 1990    MRN: [id number]   Acct: [de-identified]    Primary Care Physician: No primary care provider on file. Admit date:  5/14/2022  3:15 PM  Discharge date: 5/16/2022    Discharge Diagnoses:     1.)  Chest pain, due to unknown etiology, not cardiac in origin, improved with rest and time and analgesia, will follow-up with PCP in the outpatient setting    Follow-up:  Call today/tomorrow for a follow up appointment with No primary care provider on file. , or return to the Emergency Room with worsening symptoms    Stressed to patient the importance of following up with primary care doctor for further workup/management of symptoms. Pt verbalizes understanding and agrees with plan. Discharge Medication Changes:       Medication List      CONTINUE taking these medications    acetaminophen 500 MG tablet  Commonly known as: TYLENOL  Take 1 tablet by mouth 3 times daily as needed for Pain     albuterol sulfate  (90 Base) MCG/ACT inhaler  Commonly known as: Ventolin HFA  Inhale 2 puffs into the lungs every 6 hours as needed for Wheezing     amLODIPine 5 MG tablet  Commonly known as: NORVASC  Take 1 tablet by mouth daily     clotrimazole-betamethasone 1-0.05 % cream  Commonly known as: LOTRISONE  Apply topically 2 times daily. FLUoxetine 40 MG capsule  Commonly known as: PROZAC  Take 1 capsule by mouth daily     hydrocortisone valerate 0.2 % ointment  Commonly known as: Westcort  Apply topically daily. hydrOXYzine 50 MG capsule  Commonly known as: VISTARIL     Misc. Devices Misc  1 PAIR OF DIABETIC SHOES (1 LEFT/ 1 RIGHT)  1 -3 PAIRS OF INSERTS (LEFT/ RIGHT)     traZODone 50 MG tablet  Commonly known as: DESYREL  Take 1 tablet by mouth nightly as needed for Sleep     ziprasidone 40 MG capsule  Commonly known as: GEODON  Take 1 capsule by mouth 2 times daily (with meals)            Diet:  ADULT DIET;  Regular, advance as tolerated Activity:  As tolerated    Consultants: IP CONSULT TO CARDIOLOGY  IP CONSULT TO CARDIOLOGY    Procedures:  Not indicated     Diagnostic Test:   Results for orders placed or performed during the hospital encounter of 05/14/22   COVID-19, Rapid    Specimen: Nasopharyngeal Swab   Result Value Ref Range    Specimen Description . NASOPHARYNGEAL SWAB     SARS-CoV-2, Rapid Not Detected Not Detected   Comprehensive Metabolic Panel w/ Reflex to MG   Result Value Ref Range    Glucose 138 (H) 70 - 99 mg/dL    BUN 11 6 - 20 mg/dL    CREATININE 0.99 (H) 0.50 - 0.90 mg/dL    Calcium 8.9 8.6 - 10.4 mg/dL    Sodium 135 135 - 144 mmol/L    Potassium 3.4 (L) 3.7 - 5.3 mmol/L    Chloride 105 98 - 107 mmol/L    CO2 23 20 - 31 mmol/L    Anion Gap 7 (L) 9 - 17 mmol/L    Alkaline Phosphatase 68 35 - 104 U/L    ALT 20 5 - 33 U/L    AST 14 <32 U/L    Total Bilirubin 0.37 0.3 - 1.2 mg/dL    Total Protein 6.7 6.4 - 8.3 g/dL    Albumin 3.9 3.5 - 5.2 g/dL    Albumin/Globulin Ratio 1.4 1.0 - 2.5    GFR Non-African American >60 >60 mL/min    GFR African American >60 >60 mL/min    GFR Comment         CBC with Auto Differential   Result Value Ref Range    WBC 10.0 3.5 - 11.3 k/uL    RBC 4.51 3.95 - 5.11 m/uL    Hemoglobin 12.8 11.9 - 15.1 g/dL    Hematocrit 38.9 36.3 - 47.1 %    MCV 86.3 82.6 - 102.9 fL    MCH 28.4 25.2 - 33.5 pg    MCHC 32.9 28.4 - 34.8 g/dL    RDW 13.0 11.8 - 14.4 %    Platelets 942 405 - 151 k/uL    MPV 9.3 8.1 - 13.5 fL    NRBC Automated 0.0 0.0 per 100 WBC    Seg Neutrophils 59 36 - 65 %    Lymphocytes 30 24 - 43 %    Monocytes 6 3 - 12 %    Eosinophils % 3 1 - 4 %    Basophils 1 0 - 2 %    Immature Granulocytes 1 (H) 0 %    Segs Absolute 6.03 1.50 - 8.10 k/uL    Absolute Lymph # 2.99 1.10 - 3.70 k/uL    Absolute Mono # 0.58 0.10 - 1.20 k/uL    Absolute Eos # 0.27 0.00 - 0.44 k/uL    Basophils Absolute 0.06 0.00 - 0.20 k/uL    Absolute Immature Granulocyte 0.07 0.00 - 0.30 k/uL   Troponin   Result Value Ref Range Troponin, High Sensitivity <6 0 - 14 ng/L   Brain Natriuretic Peptide   Result Value Ref Range    Pro-BNP <20 <300 pg/mL   D-Dimer, Quantitative   Result Value Ref Range    D-Dimer, Quant <0.17 mg/L FEU   Magnesium   Result Value Ref Range    Magnesium 1.9 1.6 - 2.6 mg/dL   Troponin   Result Value Ref Range    Troponin, High Sensitivity <6 0 - 14 ng/L   Comprehensive Metabolic Panel w/ Reflex to MG   Result Value Ref Range    Glucose 191 (H) 70 - 99 mg/dL    BUN 13 6 - 20 mg/dL    CREATININE 1.01 (H) 0.50 - 0.90 mg/dL    Calcium 8.2 (L) 8.6 - 10.4 mg/dL    Sodium 135 135 - 144 mmol/L    Potassium 3.8 3.7 - 5.3 mmol/L    Chloride 104 98 - 107 mmol/L    CO2 24 20 - 31 mmol/L    Anion Gap 7 (L) 9 - 17 mmol/L    Alkaline Phosphatase 80 35 - 104 U/L    ALT 18 5 - 33 U/L    AST 14 <32 U/L    Total Bilirubin <0.10 (L) 0.3 - 1.2 mg/dL    Total Protein 5.6 (L) 6.4 - 8.3 g/dL    Albumin 3.2 (L) 3.5 - 5.2 g/dL    Albumin/Globulin Ratio 1.3 1.0 - 2.5    GFR Non-African American >60 >60 mL/min    GFR African American >60 >60 mL/min    GFR Comment         HCG Qualitative, Serum   Result Value Ref Range    hCG Qual NEGATIVE NEGATIVE   POC Glucose Fingerstick   Result Value Ref Range    POC Glucose 161 (H) 65 - 105 mg/dL   POC Glucose Fingerstick   Result Value Ref Range    POC Glucose 120 (H) 65 - 105 mg/dL   POC Glucose Fingerstick   Result Value Ref Range    POC Glucose 146 (H) 65 - 105 mg/dL   EKG 12 Lead   Result Value Ref Range    Ventricular Rate 102 BPM    Atrial Rate 102 BPM    P-R Interval 146 ms    QRS Duration 76 ms    Q-T Interval 338 ms    QTc Calculation (Bazett) 440 ms    P Axis 56 degrees    R Axis 24 degrees    T Axis 24 degrees   EKG 12 lead   Result Value Ref Range    Ventricular Rate 74 BPM    Atrial Rate 74 BPM    P-R Interval 168 ms    QRS Duration 78 ms    Q-T Interval 390 ms    QTc Calculation (Bazett) 432 ms    P Axis 46 degrees    R Axis 39 degrees    T Axis 24 degrees     XR CHEST PORTABLE    Result Date: 5/14/2022  EXAMINATION: ONE XRAY VIEW OF THE CHEST 5/14/2022 3:49 pm COMPARISON: 07/15/2019 HISTORY: ORDERING SYSTEM PROVIDED HISTORY: chest pain TECHNOLOGIST PROVIDED HISTORY: chest pain FINDINGS: Heart size and pulmonary vascularity are within normal limits. No focal consolidation. Normal           Physical Exam:    General appearance - NAD, AOx 3   Lungs -CTAB, no R/R/R  Heart - RRR, no M/R/G  Abdomen - Soft, NT/ND  Neurological:  MAEx4, No focal motor deficit, sensory loss  Extremities - Cap refil <2 sec in all ext., no edema  Skin -warm, dry      Hospital Course:  Clinical course has improved, labs and imaging reviewed. Juliet Veras originally presented to the hospital on 5/14/2022  3:15 PM with chest pain. At that time it was determined that She required further observation and testing and consultation. Labs and imaging were followed daily. Imaging results as above. She is medically stable to be discharged. Disposition: Home    Patient stated that they will not drive themselves home from the hospital if they have gotten pain killers/ narcotics earlier that day and that they will arrange for transportation on their own or work with the  for a ride. Patient counseled NOT to drive while under the influence of narcotics/ pain killers. Condition: Good    Patient stable and ready for discharge home. I have discussed plan of care with patient and they are in understanding. They were instructed to read discharge paperwork. All of their questions and concerns were addressed. Time Spent: 2 day      --  Mitesh Alvares DO  Emergency Medicine Resident Physician    This dictation was generated by voice recognition computer software. Although all attempts are made to edit the dictation for accuracy, there may be errors in the transcription that are not intended.

## 2022-05-16 NOTE — PROGRESS NOTES
OBS/CDU   Attending NOTE      Patients PCP is: No primary care provider on file. SUBJECTIVE      No acute events overnight. Has been able to tolerate a full diet without nausea or vomiting. The patient is urinating on his own and is passing flatus. Denies fever, chills, nausea, vomiting, chest pain, shortness of breath, abdominal pain, focal weakness, numbness, tingling, urinary/bowel symptoms, vision changes, visual hallucinations, or headache. Patient feeling well this morning awaiting stress testing. PHYSICAL EXAM      General: NAD, AO X 3  Heent: EMOI, PERRL  Neck: SUPPLE, NO JVD  Cardiovascular: RRR, S1S2  Pulmonary: CTAB, NO SOB  Abdomen: SOFT, NTTP, ND, +BS  Extremities: +2/4 PULSES DISTAL, NO SWELLING  Neuro / Psych: NO NUMBNESS OR TINGLING, MENTATION AT BASELINE    PERTINENT TEST /EXAMS      I have reviewed all available laboratory results.     MEDICATIONS CURRENT   sodium chloride flush 0.9 % injection 10 mL, PRN  sodium chloride flush 0.9 % injection 10 mL, PRN  enoxaparin Sodium (LOVENOX) injection 30 mg, BID  albuterol sulfate  (90 Base) MCG/ACT inhaler 2 puff, Q6H PRN  amLODIPine (NORVASC) tablet 5 mg, Daily  FLUoxetine (PROZAC) capsule 40 mg, Daily  hydrOXYzine (ATARAX) tablet 50 mg, TID PRN  traZODone (DESYREL) tablet 50 mg, Nightly PRN  ziprasidone (GEODON) capsule 40 mg, BID WC  0.9 % sodium chloride infusion, Continuous  sodium chloride flush 0.9 % injection 5-40 mL, 2 times per day  sodium chloride flush 0.9 % injection 5-40 mL, PRN  0.9 % sodium chloride infusion, PRN  potassium chloride (KLOR-CON M) extended release tablet 40 mEq, PRN   Or  potassium bicarb-citric acid (EFFER-K) effervescent tablet 40 mEq, PRN   Or  potassium chloride 10 mEq/100 mL IVPB (Peripheral Line), PRN  ondansetron (ZOFRAN) injection 4 mg, Q4H PRN  polyethylene glycol (GLYCOLAX) packet 17 g, Daily PRN  acetaminophen (TYLENOL) tablet 650 mg, Q6H PRN   Or  acetaminophen (TYLENOL) suppository 650 mg, Q6H PRN        All medication charted and reviewed. CONSULTS      IP CONSULT TO CARDIOLOGY  IP CONSULT TO CARDIOLOGY    ASSESSMENT/PLAN       Vivianne Nissen is a 33 y.o. female who presents with chest pain now resolved      · Patient previously with positive stress test.  This was in 2019. Patient did not have follow-up though the testing did suggest positive reversible ischemia. Patient for further testing today to confirm findings and help risk stratify. · Patient currently comfortable with no complaint. · Continue home medications and pain control  · Monitor vitals, labs, and imaging  · DISPO: pending consults and clinical improvement    --  Kenrick Umanzor MD  Emergency Medicine Attending Physician     This dictation was generated by voice recognition computer software. Although all attempts are made to edit the dictation for accuracy, there may be errors in the transcription that are not intended.

## 2022-05-16 NOTE — PROGRESS NOTES
[facility]  Progress Note                   Date:   5/16/2022  Patient name: Rivera Avalos  Date of admission:  5/14/2022  3:15 PM  MRN:   [id number]  YOB: 1990  PCP: No primary care provider on file. Reason for Admission: Chest pain [R07.9]    Subjective:       Clinical Changes /Abnormalities:Patient seen and examined after stress test. Complains of mild headache . Resting comfortably without any distress . Tele/vitals/labs reviewed . Discussed case with RN. Review of Systems    Medications:   Scheduled Meds:   enoxaparin  30 mg SubCUTAneous BID    amLODIPine  5 mg Oral Daily    FLUoxetine  40 mg Oral Daily    ziprasidone  40 mg Oral BID WC    sodium chloride flush  5-40 mL IntraVENous 2 times per day     Continuous Infusions:   sodium chloride 125 mL/hr at 05/15/22 2024    sodium chloride       CBC:   Recent Labs     05/14/22  1544   WBC 10.0   HGB 12.8        BMP:    Recent Labs     05/14/22  1544 05/15/22  0309    135   K 3.4* 3.8    104   CO2 23 24   BUN 11 13   CREATININE 0.99* 1.01*   GLUCOSE 138* 191*     Hepatic:  Recent Labs     05/14/22  1544 05/15/22  0309   AST 14 14   ALT 20 18   BILITOT 0.37 <0.10*   ALKPHOS 68 80     Troponin:   Recent Labs     05/14/22  1544 05/14/22  1721   TROPHS <6 <6     BNP: No results for input(s): BNP in the last 72 hours. Lipids: No results for input(s): CHOL, HDL in the last 72 hours. Invalid input(s): LDLCALCU  INR: No results for input(s): INR in the last 72 hours. DATA:    Diagnostics:    EKG: normal sinus rhythm, nonspecific ST and T waves changes, unchanged from previous tracings     ECHO: not obtained.      Stress Test: reviewed. Stress test reviewed from 1/23/19:  Electrocardiographically negative Lexiscan stress study. Perfusion imaging demonstrated:   There is large severe mid anterior wall reversible perfusion defect which   persists on attenuation correction.  Findings indicative of ischemia.  Total   perfusion defect about 8%.  The similar partially reversible defect in the   basal and apical anterior wall resolves on attenuation correction.       The gated images show no wall motion abnormalities. Normal myocardial   thickening.      Cardiac Angiography: not obtained. Objective:   Vitals: /85   Pulse 70   Temp 98.1 °F (36.7 °C) (Oral)   Resp 18   Ht 5' 7\" (1.702 m)   Wt (!) 325 lb (147.4 kg)   SpO2 99%   BMI 50.90 kg/m²   General appearance: alert and cooperative with exam  HEENT: Head: Normocephalic, no lesions, without obvious abnormality. Neck:no JVD, trachea midline, no adenopathy  Lungs: Clear to auscultation  Heart: Regular rate and rhythm, s1/s2 auscultated, no murmurs  Abdomen: soft, non-tender, bowel sounds active  Extremities: no edema  Neurologic: not done        Assessment / Acute Cardiac Problems:   1. chest pain persistent for 2 days. Recent cocaine use. abnormal stress test 2019   2. Uncontrolled HTN    Patient Active Problem List:     Asthma     Anxiety and depression     Morbid obesity with BMI of 50.0-59.9, adult ([facility])     Seasonal allergies     Bipolar disorder, mixed ([facility])     Cannabis abuse     Prediabetes     Irregular menses     PCOS (polycystic ovarian syndrome)     ASCUS w/ NEG HR HPV     Mirena IUD insertion 1/7/19     Chronic hypertension     Chest pain      Plan of Treatment:   1. Cardiac stable-continue Norvasc  2.  Awaiting stress test result-if negative and low risk patient can be discharged from cardiology standpoint    Electronically signed by NORA Kraus NP on 5/16/2022 at 1:13 PM  [id number] [address].  [phone]

## 2022-05-17 ENCOUNTER — CARE COORDINATION (OUTPATIENT)
Dept: CASE MANAGEMENT | Age: 32
End: 2022-05-17

## 2022-05-17 DIAGNOSIS — R07.89 OTHER CHEST PAIN: Primary | ICD-10-CM

## 2022-05-17 LAB
EKG ATRIAL RATE: 74 BPM
EKG P AXIS: 46 DEGREES
EKG P-R INTERVAL: 168 MS
EKG Q-T INTERVAL: 390 MS
EKG QRS DURATION: 78 MS
EKG QTC CALCULATION (BAZETT): 432 MS
EKG R AXIS: 39 DEGREES
EKG T AXIS: 24 DEGREES
EKG VENTRICULAR RATE: 74 BPM

## 2022-05-17 PROCEDURE — 1111F DSCHRG MED/CURRENT MED MERGE: CPT | Performed by: NURSE PRACTITIONER

## 2022-05-17 PROCEDURE — 93010 ELECTROCARDIOGRAM REPORT: CPT | Performed by: INTERNAL MEDICINE

## 2022-05-17 NOTE — CARE COORDINATION
[place] [facility] Transitions Initial Follow Up Call    Call within 2 business days of discharge: Yes    Patient: Keysha Night Patient : 1990   MRN: [id number]  Reason for Admission: Chest pain  Discharge Date: 22 RARS: No data recorded    Last Discharge [facility]       Complaint Diagnosis Description Type Department Provider    22 Chest Pain Other chest pain ED to Hosp-Admission (Discharged) (ADMITTED) VZ 3C Alma Longoria MD; Laith Leung. .. Spoke with: Patient    Facility: [facility]    Non-face-to-face services provided:  Obtained and reviewed discharge summary and/or continuity of care documents     Spoke with patient who is feeling better today. She has had no further chest pains since discharge. Patient had had stress test done in 2019 which needed additional follow up but patient did not have follow up. She does not have a cardiologist she states. I advised she needs to discuss with her PCP if she needs to be seen by specialist.  She has a new patient appointment at the [facility] for , will see if appointment can be moved up sooner for follow up from hospital. Discussed with patient her use of recreational drugs, specifically cocaine and the effects it has on her heart. She was advised to abstain from this. She reports she did not realize the effects of this with using only occasionally. Reviewed discharge instructions, 1111F  done. Transitions of Care Initial Call    Was this an external facility discharge? No Discharge Facility: [facility]      Challenges to be reviewed by the provider   Additional needs identified to be addressed with provider: No  none             Method of communication with provider : none    Advance Care Planning:   Does patient have an Advance Directive: not on file; education provided. Care Transition Nurse (CTN) contacted the patient by telephone to perform post hospital discharge assessment.  Verified name and  with patient as identifiers. Provided introduction to self, and explanation of the CTN role. CTN reviewed discharge instructions, medical action plan and red flags with patient who verbalized understanding. Patient given an opportunity to ask questions and does not have any further questions or concerns at this time. Were discharge instructions available to patient? Yes. Reviewed appropriate site of care based on symptoms and resources available to patient including: PCP. The patient agrees to contact the PCP office for questions related to their healthcare. Medication reconciliation was performed with patient, who verbalizes understanding of administration of home medications. Advised obtaining a 90-day supply of all daily and as-needed medications. Was patient discharged with a pulse oximeter? no    CTN provided contact information. Plan for follow-up call in 5-7 days based on severity of symptoms and risk factors. Plan for next call: check for new symptoms, check on appointments.          Care Transitions 24 Hour Call    Schedule Follow Up Appointment with PCP: Completed  Do you have a copy of your discharge instructions?: Yes  Do you have all of your prescriptions and are they filled?: Yes  Have you been contacted by a [facility] [address]?: No  Have you scheduled your follow up appointment?: Yes  How are you going to get to your appointment?: Car - family or friend to transport  Do you feel like you have everything you need to keep you well at home?: Yes  Care Transitions Interventions         Follow Up  Future Appointments   Date Time Provider Lizeth De Souza   6/8/2022 10:00 AM Odilia Waters MD [facility] ANNA Banuelos RN

## 2022-05-17 NOTE — CARE COORDINATION
Request from [address] Shane Cummings ([place] [address]), RN.,  Please schedule patient for hospital f/u    Patient scheduled with [facility] hospital f/u this Friday with Dr. Jessica Bauer at 10:30    Patient also has a NP appt on 6/8.        Marina Gitelman, [address] [facility] Coordination Transition

## 2022-05-18 LAB
EKG ATRIAL RATE: 102 BPM
EKG P AXIS: 56 DEGREES
EKG P-R INTERVAL: 146 MS
EKG Q-T INTERVAL: 338 MS
EKG QRS DURATION: 76 MS
EKG QTC CALCULATION (BAZETT): 440 MS
EKG R AXIS: 24 DEGREES
EKG T AXIS: 24 DEGREES
EKG VENTRICULAR RATE: 102 BPM

## 2022-05-24 ENCOUNTER — CARE COORDINATION (OUTPATIENT)
Dept: CASE MANAGEMENT | Age: 32
End: 2022-05-24

## 2022-05-24 ENCOUNTER — OFFICE VISIT (OUTPATIENT)
Dept: INTERNAL MEDICINE | Age: 32
End: 2022-05-24
Payer: MEDICARE

## 2022-05-24 VITALS
SYSTOLIC BLOOD PRESSURE: 136 MMHG | WEIGHT: 293 LBS | OXYGEN SATURATION: 99 % | HEART RATE: 92 BPM | DIASTOLIC BLOOD PRESSURE: 84 MMHG | TEMPERATURE: 97.2 F | HEIGHT: 67 IN | BODY MASS INDEX: 45.99 KG/M2

## 2022-05-24 DIAGNOSIS — F32.A ANXIETY AND DEPRESSION: ICD-10-CM

## 2022-05-24 DIAGNOSIS — F41.9 ANXIETY AND DEPRESSION: ICD-10-CM

## 2022-05-24 DIAGNOSIS — R07.9 CHEST PAIN, UNSPECIFIED TYPE: ICD-10-CM

## 2022-05-24 DIAGNOSIS — R73.03 PREDIABETES: Primary | ICD-10-CM

## 2022-05-24 DIAGNOSIS — G47.33 OSA (OBSTRUCTIVE SLEEP APNEA): ICD-10-CM

## 2022-05-24 DIAGNOSIS — I10 CHRONIC HYPERTENSION: ICD-10-CM

## 2022-05-24 DIAGNOSIS — Z09 HOSPITAL DISCHARGE FOLLOW-UP: ICD-10-CM

## 2022-05-24 LAB — HBA1C MFR BLD: 6.8 %

## 2022-05-24 PROCEDURE — 83036 HEMOGLOBIN GLYCOSYLATED A1C: CPT | Performed by: STUDENT IN AN ORGANIZED HEALTH CARE EDUCATION/TRAINING PROGRAM

## 2022-05-24 PROCEDURE — 99214 OFFICE O/P EST MOD 30 MIN: CPT | Performed by: STUDENT IN AN ORGANIZED HEALTH CARE EDUCATION/TRAINING PROGRAM

## 2022-05-24 PROCEDURE — 1111F DSCHRG MED/CURRENT MED MERGE: CPT | Performed by: STUDENT IN AN ORGANIZED HEALTH CARE EDUCATION/TRAINING PROGRAM

## 2022-05-24 RX ORDER — ALBUTEROL SULFATE 90 UG/1
2 AEROSOL, METERED RESPIRATORY (INHALATION) EVERY 6 HOURS PRN
Qty: 1 EACH | Refills: 1 | Status: SHIPPED | OUTPATIENT
Start: 2022-05-24 | End: 2022-06-08 | Stop reason: SDUPTHER

## 2022-05-24 RX ORDER — AMLODIPINE BESYLATE 5 MG/1
5 TABLET ORAL DAILY
Qty: 30 TABLET | Refills: 2 | Status: SHIPPED | OUTPATIENT
Start: 2022-05-24 | End: 2022-06-08 | Stop reason: SDUPTHER

## 2022-05-24 RX ORDER — CLOTRIMAZOLE AND BETAMETHASONE DIPROPIONATE 10; .64 MG/G; MG/G
CREAM TOPICAL
Qty: 45 G | Refills: 2 | Status: SHIPPED | OUTPATIENT
Start: 2022-05-24 | End: 2022-06-08 | Stop reason: SDUPTHER

## 2022-05-24 RX ORDER — HYDROCORTISONE VALERATE 2 MG/G
OINTMENT TOPICAL
Qty: 1 EACH | Refills: 0 | Status: CANCELLED | OUTPATIENT
Start: 2022-05-24

## 2022-05-24 SDOH — ECONOMIC STABILITY: FOOD INSECURITY: WITHIN THE PAST 12 MONTHS, YOU WORRIED THAT YOUR FOOD WOULD RUN OUT BEFORE YOU GOT MONEY TO BUY MORE.: SOMETIMES TRUE

## 2022-05-24 SDOH — ECONOMIC STABILITY: FOOD INSECURITY: WITHIN THE PAST 12 MONTHS, THE FOOD YOU BOUGHT JUST DIDN'T LAST AND YOU DIDN'T HAVE MONEY TO GET MORE.: SOMETIMES TRUE

## 2022-05-24 ASSESSMENT — PATIENT HEALTH QUESTIONNAIRE - PHQ9
4. FEELING TIRED OR HAVING LITTLE ENERGY: 0
10. IF YOU CHECKED OFF ANY PROBLEMS, HOW DIFFICULT HAVE THESE PROBLEMS MADE IT FOR YOU TO DO YOUR WORK, TAKE CARE OF THINGS AT HOME, OR GET ALONG WITH OTHER PEOPLE: 0
6. FEELING BAD ABOUT YOURSELF - OR THAT YOU ARE A FAILURE OR HAVE LET YOURSELF OR YOUR FAMILY DOWN: 0
7. TROUBLE CONCENTRATING ON THINGS, SUCH AS READING THE NEWSPAPER OR WATCHING TELEVISION: 0
3. TROUBLE FALLING OR STAYING ASLEEP: 0
1. LITTLE INTEREST OR PLEASURE IN DOING THINGS: 0
5. POOR APPETITE OR OVEREATING: 0
2. FEELING DOWN, DEPRESSED OR HOPELESS: 0
SUM OF ALL RESPONSES TO PHQ QUESTIONS 1-9: 0
9. THOUGHTS THAT YOU WOULD BE BETTER OFF DEAD, OR OF HURTING YOURSELF: 0
SUM OF ALL RESPONSES TO PHQ9 QUESTIONS 1 & 2: 0
8. MOVING OR SPEAKING SO SLOWLY THAT OTHER PEOPLE COULD HAVE NOTICED. OR THE OPPOSITE, BEING SO FIGETY OR RESTLESS THAT YOU HAVE BEEN MOVING AROUND A LOT MORE THAN USUAL: 0
SUM OF ALL RESPONSES TO PHQ QUESTIONS 1-9: 0

## 2022-05-24 ASSESSMENT — SOCIAL DETERMINANTS OF HEALTH (SDOH): HOW HARD IS IT FOR YOU TO PAY FOR THE VERY BASICS LIKE FOOD, HOUSING, MEDICAL CARE, AND HEATING?: SOMEWHAT HARD

## 2022-05-24 NOTE — PROGRESS NOTES
FOOD RESOURCES      RESOURCE SERVICE PHONE [employer]   Outreach of Darrell [place] 442 [phone]  Administrative www.[email or web address]. org   Mustard [employer] [phone]  Evelio. aRndall 47 - and Fax N/A   [place] Scales of [place] [phone] [address] Ryann Castillo [phone] [address] / Amilcar Zheng [phone]  [place] [address]1 Assistance Programs [phone]  Kailyn Shelby. [address] Feed Your Neighbor [phone]  Linda Frye 178 [phone]  [address] [phone] [address] [phone]  Service-Intake www.joannationarmynwohio. 5353 [facility] Emergency Food Pantry [phone]  Service-Intake www.[facility]. [facility] [address] ([phone]  Service-Intake www.stpaulscommunitycenter. [address] [place] [address] [phone]  Administrative www. [facility]. [address] Bank Back Pack Program [phone]  Danilo Coello. [facility] Healthcare[facility] Meals Program [phone] 450 [facility] and Shelter for men www.[facility]. [facility] for Social and 351 [facility] 2120 1395 www. [facility] Childrenecentertoledo. [address] [place] [address] [phone]  [facility] Elva Johnson eXI[employer]. com    Buddhist Women [employer] Emergency Assistance [phone] [address] Feed Your Neighbor [phone] Administrative N/A   [facility] 65 22 [phone]  [address] / Harika Jacob [phone] 112 [facility] [phone]  300 [facility] Pantry / Meals [phone]  [address]  www. [facility]. [facility] 50 Pantry / [employer] [phone]  Administrative www. balta. [facility] [phone]  [address] of [address] [facility] / Benitez Crock [phone]  1841 [facility] Pantry and Grooming Supplies [phone]  [address]  [email or web address]    [address] [phone]  1300 [facility] PASSAVANT-CRANBERRY-ER Positive Living Program [phone] ext: 25 4604 [facility] www. Ticketfly. SendRR    Food for [address] 42-10-72-75 www. feedtoledo. Eric 50 Pantry ([phone] [address] [place] of [phone] http://www. SmartvuetheArtisan Mobileion. [employer]    776 [facility] [phone]  227 [facility] Family Pantry and South [place]andro [phone] nightingales-harvest.org    201 [facility] [phone]  Ul. [facility] www. [email or web address]    [facility] Financial [phone]  Administrative factoledo. [address] [place], [place] [address] [phone]  Toll Free www. In[employer]Path Software    Body of 3 [facility] for [place] 1 Pantry (125) 110 [facility] [phone] www. [facility]. [employer]oso SACV     Living [address] [phone]  [address] Morning Blessings [phone]  Administrative N/A   [facility]  of [address] [facility] Feed Your Neighbor [phone]  [address] for the Poor Food Pantry [phone]  854 [facility]. org/   [facility] of [facility] 30 Pantry  [phone] Kailee [place] 13 Emergency Food and Hygiene Pantry [phone]  Administrative www. [facility]. [facility] [address] [phone]  [facility] [phone]  [address] tv    Helping Hands of [address] / Hunter Postal / Herminio Powell [phone]  [address] [place] [address]. [address] Pantry Ministry [phone]  [place] [address] [phone]  [address] [place] [address]  [phone]  Yabbly.pt    125 [facility] Pantry [phone] http://[facility].[email or web address]/   400 [facility] Pantry  [phone] N/A   Food for 31 [facility] Pantry [phone] N/A     Updated 1/30/20

## 2022-05-24 NOTE — PROGRESS NOTES
[facility] [address] [address]  Dept: [phone]  Dept Fax: [phone]    Office Progress/Follow Up Note  Date of patient's visit: 5/24/2022  Patient's Name:  Temo Martinez YOB: 1990            Patient Care Team:  Brenda Petty MD as PCP - General (Internal Medicine)  NORA Pino CNP as PCP - [facility] Empaneled Provider  Sondra Krueger DPM as Physician (Podiatry)  Patricio Mercer RN as Care Transitions Nurse    REASON FOR VISIT: post hospital    HISTORY OF PRESENT ILLNESS:      Chief Complaint   Patient presents with    Follow-Up from Hospital     chest pain,     Health Maintenance     A1C running,        History was obtained from the patient. Temo Martinez is a 33 y.o. is here for a post hospital visit. Patient presented to the hospital on 5/4/1822 with chest pain. Patient had similar episode in 2019, at that time stress test did show moderate to severe reversible ischemia. Patient was was to follow-up with cardiology at the time but did not. On this current presentation to ED, troponins were negative, EKG was showing NSR. Cardiology was consulted to evaluate patient and stress test was performed which was negative. On present examination today, patient states chest pain has significantly improved. Afebrile, hemodynamically stable, no shortness of breath, nausea or vomiting. Patient was started on Norvasc at the hospital and blood pressure is appropriate today. No other acute complaints.       Patient Active Problem List   Diagnosis    Asthma    Anxiety and depression    Morbid obesity with BMI of 50.0-59.9, adult ([address]    Seasonal allergies    Bipolar disorder, mixed ([address]    Cannabis abuse    Prediabetes    Irregular menses    PCOS (polycystic ovarian syndrome)    ASCUS w/ NEG HR HPV    Mirena IUD insertion 1/7/19    Chronic hypertension    Chest pain         Health Maintenance Due   Topic Date Due    Pneumococcal 0-64 years Vaccine (1 - PCV) Never done    Depression Monitoring  08/18/2021    COVID-19 Vaccine (3 - Booster for Pfizer series) 10/12/2021         Allergies   Allergen Reactions    Latex      Rash     Fruit & Vegetable Daily [Nutritional Supplements]      Cataloupe,honeydew,grapes,watermelon,bananas,apples,pineapples    Seasonal          MEDICATIONS:      Current Outpatient Medications   Medication Sig Dispense Refill    hydrOXYzine (VISTARIL) 50 MG capsule Take 50 mg by mouth 3 times daily as needed      acetaminophen (TYLENOL) 500 MG tablet Take 1 tablet by mouth 3 times daily as needed for Pain 120 tablet 0    hydrocortisone valerate (WESTCORT) 0.2 % ointment Apply topically daily. 1 Tube 3    clotrimazole-betamethasone (LOTRISONE) 1-0.05 % cream Apply topically 2 times daily. 45 g 2    albuterol sulfate HFA (VENTOLIN HFA) 108 (90 Base) MCG/ACT inhaler Inhale 2 puffs into the lungs every 6 hours as needed for Wheezing 1 Inhaler 1    amLODIPine (NORVASC) 5 MG tablet Take 1 tablet by mouth daily 30 tablet 2    FLUoxetine (PROZAC) 40 MG capsule Take 1 capsule by mouth daily 30 capsule 0    ziprasidone (GEODON) 40 MG capsule Take 1 capsule by mouth 2 times daily (with meals) 60 capsule 0    traZODone (DESYREL) 50 MG tablet Take 1 tablet by mouth nightly as needed for Sleep 30 tablet 0     Current Facility-Administered Medications   Medication Dose Route Frequency Provider Last Rate Last Admin    levonorgestrel (MIRENA) IUD 52 mg 1 each  1 each IntraUTERine Once Roddie Bibles   1 each at 01/07/19 1644       SOCIAL HISTORY    Reviewed and no change from previous record. Tereso Crockett  reports that she has been smoking cigarettes. She has a 5.00 pack-year smoking history.  She has never used smokeless tobacco.    FAMILY HISTORY:    Reviewed and No change from previous visit    REVIEW OF SYSTEMS:    CONSTITUTIONAL: Denies: fever, chills  PSYCH: Denies: anxiety, depression  ALLERGIES: Denies: urticaria  EYES: Denies: blurry vision, decreased vision, photophobia  ENT: Denies: sore throat, nasal congestion  CARDIOVASCULAR: Denies: chest pain, dyspnea on exertion  RESPIRATORY: Denies: cough, hemoptysis, shortness of breath  GI: Denies: Denies: abdominal pain, flank pain  : Denies: Denies: dysuria, frequency/urgency  NEURO: Denies: dizzy/vertigo, headache  MUSCULOSKELETAL: Denies: back pain, joint pain  SKIN: Denies: rash, itching    PHYSICAL EXAM:      Vitals:    05/24/22 1506   BP: 136/84   Pulse: 92   Temp: 97.2 °F (36.2 °C)   TempSrc: Infrared   SpO2: 99%   Weight: (!) 330 lb (149.7 kg)   Height: 5' 7\" (1.702 m)     BP Readings from Last 3 Encounters:   05/24/22 136/84   05/16/22 125/85   04/20/22 138/83      General appearance - alert, well appearing, and in no distress  Mental status - alert, oriented to person, place, and time  Chest - clear to auscultation, no wheezes, rales or rhonchi, symmetric air entry  Heart - normal rate, regular rhythm, normal S1, S2, no murmurs, rubs, clicks or gallops  Abdomen - soft, nontender, nondistended, no masses or organomegaly  Musculoskeletal - no joint tenderness, deformity or swelling  Extremities - peripheral pulses normal, no pedal edema, no clubbing or cyanosis  Skin - normal coloration and turgor, no rashes, no suspicious skin lesions noted    LABORATORY FINDINGS:    CBC:  Lab Results   Component Value Date    WBC 10.0 05/14/2022    HGB 12.8 05/14/2022     05/14/2022     06/01/2012       BMP:    Lab Results   Component Value Date     05/15/2022    K 3.8 05/15/2022     05/15/2022    CO2 24 05/15/2022    BUN 13 05/15/2022    CREATININE 1.01 05/15/2022    GLUCOSE 191 05/15/2022    GLUCOSE 157 06/01/2012       HEMOGLOBIN A1C:   Lab Results   Component Value Date    LABA1C 6.8 05/24/2022       FASTING LIPID PANEL:  Lab Results   Component Value Date    CHOL 231 (H) 01/10/2019    HDL 48 01/10/2019    HDL 48 01/10/2019    [facility] 124 01/10/2019       ASSESSMENT AND PLAN:    Bubba Szymanski was seen today for follow-up from hospital and health maintenance. Diagnoses and all orders for this visit:    1. Chronic hypertension  norvasc    2. Diabetes  POCT glycosylated hemoglobin (Hb A1C) - 6.8  Will start metformin today      3. Anxiety and depression  Prozac, trazadone    4. Body mass index (BMI) 50.0-59.9, adult (HCC)            FOLLOW UP AND INSTRUCTIONS:   · No follow-ups on file. · Bubba Szymanski received counseling on the following healthy behaviors: nutrition, exercise and medication adherence    · Discussed use, benefit, and side effects of prescribed medications. Barriers to medication compliance addressed. All patient questions answered. Pt voiced understanding. · Patient given educational materials - see patient instructions    Brenda Petty MD  Internal Medicine Resident, PGY-3  [facility];  [place], [place]  5/24/2022, 3:34 PM

## 2022-05-24 NOTE — PROGRESS NOTES
Attending Physician Statement  I have discussed the care of Billy Carrera including pertinent history and exam findings,  with the resident. I have reviewed the key elements of all parts of the encounter with the resident. I agree with the assessment, plan and orders as documented by the resident.   (GE Modifier)    MD INGRID Light  Attending Physician, [address] [place] [address] Internal Medicine Residency Program  [address] [place] [address]  5/24/2022, 4:00 PM

## 2022-05-24 NOTE — CARE COORDINATION
[facility] Transitions Follow Up Call    2022    Patient: Annmarie Fischer  Patient : 1990   MRN: [id number]  Reason for Admission: Chest pain  Discharge Date: 22 RARS: No data recorded       Attempted to reach patient for subsequent transitional call. VM left to return call to [phone]. 1st attempt. Noted patient appointment is today at 3 pm.  Patient was reminded via  about appointment as well as given my contact information.       Follow Up  Future Appointments   Date Time Provider Lizeth De Souza   2022  3:00 PM Blanca Michele MD [facility] IM [id number]   2022 10:00 AM Tristan Roth MD [facility] IM Mojgan Corcoran RN

## 2022-05-24 NOTE — PATIENT INSTRUCTIONS
An After Visit Summary was printed and given to the patient. CB    A printed script for Durable Medical Equipment was ordered for the patient. The script was faxed to [facility]- [address]  P: [phone] F: [phone] per the patients request.

## 2022-05-25 ENCOUNTER — CARE COORDINATION (OUTPATIENT)
Dept: CASE MANAGEMENT | Age: 32
End: 2022-05-25

## 2022-05-25 NOTE — CARE COORDINATION
[facility] Transitions Follow Up Call    2022    Patient: Olinda De Jesus  Patient : 1990   MRN: [id number]  Reason for Admission: Chest pain  Discharge Date: 22 RARS: No data recorded       Attempted to reach patient for subsequent transitional call.  left to return call to [phone]. 1st attempt. Patient was seen by PCP yesterday.       Follow Up  Future Appointments   Date Time Provider Lizeth De Souza   2022 10:00 AM Chloe Hamman, MD [facility] ANNA Sal RN

## 2022-05-27 ENCOUNTER — TELEPHONE (OUTPATIENT)
Dept: INTERNAL MEDICINE | Age: 32
End: 2022-05-27

## 2022-05-27 ENCOUNTER — CARE COORDINATION (OUTPATIENT)
Dept: CASE MANAGEMENT | Age: 32
End: 2022-05-27

## 2022-05-27 NOTE — CARE COORDINATION
[facility] Transitions Follow Up Call    2022    Patient: Humble Clemens  Patient : 1990   MRN: [id number]  Reason for Admission: Chest pain  Discharge Date: 22 RARS: No data recorded       Spoke with: Patient    Spoke with patient who said she has been doing ok this week. She has not had any further chest pain, however today she said for the past 2 days she has had more sinus congestion and coughing up phlegm. She denies f/c, n/v, shortness of breath or chest pain, she is taking Thera flu for her symptoms at this time. PCP notes reviewed from follow up visit, no new medication changes and she said they did not feel she needed to follow up with cardiology at this time. She denies any needs or concerns at this time. Care Transitions Follow Up Call    Needs to be reviewed by the provider   Additional needs identified to be addressed with provider: No  none             Method of communication with provider : none      Care Transition Nurse contacted the patient by telephone to follow up after admission on . Verified name and  with patient as identifiers. Addressed changes since last contact: none  Discussed follow-up appointments. CTN reviewed discharge instructions, medical action plan and red flags with patient and discussed any barriers to care and/or understanding of plan of care after discharge. Discussed appropriate site of care based on symptoms and resources available to patient including: PCP. The patient agrees to contact the PCP office for questions related to their healthcare. Patients top risk factors for readmission: medical condition-Chest pain  Interventions to address risk factors: Education of patient/family/caregiver/guardian to support self-management-Discussed drinking fluids, resting, using OTC for cold symptoms      Non-[facility] follow up appointment(s): n/a    CTN provided contact information for future needs.  Plan for follow-up call in 5-7 days based on

## 2022-05-27 NOTE — TELEPHONE ENCOUNTER
----- Message from Jessica Nolan sent at 5/27/2022 10:46 AM EDT -----  Subject: Message to Provider    QUESTIONS  Information for Provider? Pt was calling back in to give nurse Franklin   the fax # for [facility] Fx # [phone] Please fax RX over  ---------------------------------------------------------------------------  --------------  CALL BACK INFO  What is the best way for the office to contact you? OK to leave message on   voicemail  Preferred Call Back Phone Number? [phone]  ---------------------------------------------------------------------------  --------------  SCRIPT ANSWERS  Relationship to Patient?  Self

## 2022-06-01 ENCOUNTER — CARE COORDINATION (OUTPATIENT)
Dept: CASE MANAGEMENT | Age: 32
End: 2022-06-01

## 2022-06-01 NOTE — CARE COORDINATION
[facility] Transitions Follow Up Call    2022    Patient: Vivianne Nissen  Patient : 1990   MRN: [id number]  Reason for Admission: Chest pain  Discharge Date: 22 RARS: No data recorded       Attempted to reach patient for subsequent transitional call.  left to return call to [phone]. 1st attempt.        Follow Up  Future Appointments   Date Time Provider Lizeth De Souza   2022 10:00 AM Yola Barraza MD [facility] IM Delcia Gaucher, RN

## 2022-06-02 ENCOUNTER — CARE COORDINATION (OUTPATIENT)
Dept: CASE MANAGEMENT | Age: 32
End: 2022-06-02

## 2022-06-02 NOTE — CARE COORDINATION
[facility] Transitions Follow Up Call    2022    Patient: Roetta Dakin  Patient : 1990   MRN: [id number]  Reason for Admission: Chest pain  Discharge Date: 22 RARS: No data recorded       Attempted to reach patient for subsequent transitional call. VM left to return call to [phone]. Final attempt. Patient had her follow up with PCP, last encounter patient had denied any ongoing chest pain, only had some sinus congestion. Left  to return call if there are any needs or concerns. Episode resolved.       Follow Up  Future Appointments   Date Time Provider Lizeth De Souza   2022 10:00 AM Lance Bai MD [facility] ANNA Valladares RN

## 2022-06-03 NOTE — ADDENDUM NOTE
"February 12, 2020       Maicol Davenport MD  [facility]  VIA In Basket      Patient: Vish Ren   YOB: 1938   Date of Visit: 2/12/2020       Dear Dr. Anjelica Murphy:    Thank you for referring Angelica Hurley to me for evaluation. Below are my notes for this visit with him. If you have questions, please do not hesitate to call me. I look forward to following your patient along with you. Sincerely,        Kristan Max MD        CC: No Recipients  Shilpa Adan MD  2/12/2020  2:46 PM  Sign when Signing Visit  Subjective  Patient ID: Vish Ren is a male. Chief Complaint   Patient presents with   â¢ Follow-up     1 mo f/u        HPI  Today is a comprehensive follow-up CV visit for this 42-year-old male. Review of the patient's family history at today's visit finds no family history for coronary artery disease relating to the patient. Since the patient's last visit to see me, the patient reports that he is not comfortable at this time proceeding with the left heart catheterization, coronary angiography, left ventriculography with PTCA/coronary stent ""standby\"" that I had recently recommended to him to further evaluate and treat the patient's coronary artery disease (clinically) based upon the patient's recent abnormal (ischemic) cardiac stress echo examination. As an alternative examination to further evaluate the patient's coronary artery disease, I suggested to the patient that he proceed with a CT coronary angiogram with CT-based FFR assessment to further evaluate the patient's coronary artery disease (clinically). The patient is comfortable proceeding with a CT coronary angiogram with CT-based FFR assessment. This examination is to be scheduled at [facility] in [place] as an outpatient. The patient is aware of this plan.   A basic metabolic panel obtained for the patient through the [facility] " Addended by: Diamante Branch on: 6/3/2022 10:50 AM     Modules accepted: Level of Service [facility] [place] on [place] in [place] on February 5, 2020 shows normal renal function for the patient. At today's visit, the patient denies current chest pain, shortness of breath, palpitations, syncope or near syncope. The patient's blood pressure is noted to be at the upper range of ideal at today's visit. I recommended clinical monitoring of the patient's essential hypertension issue on current therapy in progress at this time. The patient is accepting of this plan. No CV medication changes are advised for the patient at this time. I plan to re-see the patient in 1 month to assess the patient's CV status as well as CV issues and to review with the patient the results of his pending CT coronary angiogram with CT-based FFR assessment. Social History  Social History     Tobacco Use   â¢ Smoking status: Never Smoker   â¢ Smokeless tobacco: Never Used   Substance Use Topics   â¢ Alcohol use: No     Frequency: Never   â¢ Drug use: No        Allergies  ALLERGIES:   Allergen Reactions   â¢ Adhesive   (Environmental) Other (See Comments)   â¢ Latex Other (See Comments)       Presenting Medications  Current Medications    ACARBOSE (PRECOSE) 25 MG TABLET    TAKE ONE TABLET BY MOUTH THREE TIMES DAILY WITH MEALS     ASPIRIN 81 MG TABLET    Take 81 mg by mouth daily. ATORVASTATIN (LIPITOR) 40 MG TABLET    TAKE ONE TABLET BY MOUTH ONE TIME DAILY     AZITHROMYCIN (ZITHROMAX) 250 MG TABLET    Take 2 tablets today and then 1 daily until finished    BENZONATATE (TESSALON PERLES) 200 MG CAPSULE    Take 1 capsule by mouth 3 times daily as needed for Cough. CLOTRIMAZOLE-BETAMETHASONE (LOTRISONE) 1-0.05 % CREAM    APPLY TO AFFECTED AREA TWICE A DAY FOR 7 DAYS    GABAPENTIN (NEURONTIN) 100 MG CAPSULE    Take 1 capsule by mouth 3 times daily.     GLIMEPIRIDE (AMARYL) 4 MG TABLET    TAKE ONE TABLET BY MOUTH TWICE DAILY    GUAIATUSSIN -10 MG/5ML LIQUID    Take 5 mLs by mouth every 6 hours as needed for Cough.    LORATADINE-PSEUDOEPHEDRINE (CLARITIN-D 12 HOUR PO)    Take by mouth as needed. METFORMIN (GLUCOPHAGE) 500 MG TABLET    TAKE 1 TABLET BY MOUTH EVERY 12 HOURS WITH FOOD    PENICILLIN V POTASSIUM (VEETID) 500 MG TABLET    Take 1 tablet by mouth 3 times daily. SULFACETAMIDE-PREDNISOLONE (VASOCIDIN) 10-0.23 % OPHTHALMIC SOLUTION    Place 2 drops into both eyes 4 times daily. TRIAMCINOLONE (ARISTOCORT) 0.1 % CREAM    Apply topically 2 times daily. Review of Systems  Review of Systems   Constitution: Negative. HENT: Negative. Eyes: Negative. Cardiovascular: Negative. Respiratory: Negative. Endocrine: Negative. Hematologic/Lymphatic: Negative. Skin: Negative. Musculoskeletal: Negative. Gastrointestinal: Negative. Genitourinary: Negative. Neurological: Negative. Psychiatric/Behavioral: Negative. Allergic/Immunologic: Negative. Physical Exam  Visit Vitals  BP (!) 145/71 (BP Location: LUE - Left upper extremity, Patient Position: Sitting, Cuff Size: Regular)   Pulse 69   Wt 63.7 kg (140 lb 8.7 oz)   SpO2 99%   BMI 23.13 kg/mÂ²     Vital signs were reviewed today. Physical Exam   Constitutional: He appears healthy. No distress. HENT:   Nose: Nose normal. No nasal discharge. Mouth/Throat: Oropharynx is clear. Eyes: Pupils are equal, round, and reactive to light. Conjunctivae are normal.   Neck: Normal range of motion. Neck supple. No JVD present. No neck adenopathy. Cardiovascular: Normal rate, regular rhythm, S1 normal, S2 normal and intact distal pulses. No extrasystoles are present. PMI is not displaced. Exam reveals no gallop, no S3, no S4, no distant heart sounds, no friction rub, no midsystolic click and no opening snap. No murmur heard. Pulses:       Carotid pulses are 1+ on the right side and 1+ on the left side. Radial pulses are 1+ on the right side and 1+ on the left side.         Femoral pulses are 1+ on the right side and 1+ on the left side. Popliteal pulses are 1+ on the right side and 1+ on the left side. Dorsalis pedis pulses are 1+ on the right side and 1+ on the left side. Posterior tibial pulses are 1+ on the right side and 1+ on the left side. Pulmonary/Chest: Effort normal and breath sounds normal. No stridor. He has no wheezes. He has no rales. He exhibits no tenderness. Abdominal: Soft. Bowel sounds are normal. He exhibits no distension and no mass. There is no splenomegaly or hepatomegaly. There is no tenderness. No hernia. Musculoskeletal: Normal range of motion. General: No tenderness, deformity or edema. Neurological: He is alert and oriented to person, place, and time. He has normal motor skills and intact cranial nerves. Skin: Skin is warm and dry. No rash noted. No cyanosis. No jaundice, pallor or plethora. Nails show no clubbing. Recent Labs    Recent Labs   Lab 01/09/20  1808   CHOLESTEROL 144  149   HDL 47  49   TRIGLYCERIDE 155*  154*   CALCULATED LDL 66  69   CALCULATED NON HDL 97  100       Recent Labs   Lab 02/05/20  1342   Sodium 139   Chloride 107   BUN 15   GFR Estimate, African American >90   BUN/Creatinine Ratio 22   Potassium 4.2   Glucose 125*   Creatinine 0.69   GFR Estimate, Non  90   CALCIUM 9.0          Hemoglobin A1C (%)   Date Value   01/09/2020 7.8 (H)   .     Recent Labs   Lab 01/09/20  1808   WBC 5.5   RBC 5.27   HGB 14.6   HCT 45.8   MCV 86.9   MCHC 31.9*   RDW-CV 13.0      LYMPH 30       Recent Labs   Lab 01/09/20  1808   ALT/SGPT 24         Assessment / Plan  Patient Active Problem List   Diagnosis   â¢ Arthralgia of knee, left   â¢ Cataract   â¢ Chronic cough   â¢ Chronic pain   â¢ Back pain   â¢ Cataract cortical, senile   â¢ Constipation   â¢ Type 2 diabetes mellitus (CMS/HCC)   â¢ Dyspepsia   â¢ Glaucoma   â¢ Hearing loss   â¢ Hyperlipidemia   â¢ Impotence   â¢ Keloid scar   â¢ Need for influenza vaccination   â¢ Prostate cancer screening â¢ Radiculopathy of lumbosacral region   â¢ Pulmonary nodules   â¢ Rhinitis, allergic   â¢ Right knee pain   â¢ Sciatica   â¢ Encounter for monitoring low dose aspirin therapy   â¢ Spinal stenosis   â¢ Type 2 diabetes mellitus, uncontrolled (CMS/HCC)   â¢ Varicose veins of leg with pain   â¢ Bronchitis   â¢ Blepharoconjunctivitis of both eyes   â¢ Essential hypertension   â¢ Abnormal stress test   â¢ Encounter for monitoring statin therapy   â¢ Coronary artery disease involving native coronary artery of native heart   â¢ Venous insufficiency of left lower extremity   â¢ Abnormal electrocardiogram (ECG) (EKG)   â¢ LAFB (left anterior fascicular block)         Sandor Jay.  Chris Salazar MD, [facility], [facility] Cardiologist

## 2022-06-08 ENCOUNTER — OFFICE VISIT (OUTPATIENT)
Dept: INTERNAL MEDICINE | Age: 32
End: 2022-06-08
Payer: MEDICARE

## 2022-06-08 VITALS
HEART RATE: 107 BPM | OXYGEN SATURATION: 99 % | TEMPERATURE: 97.5 F | WEIGHT: 293 LBS | DIASTOLIC BLOOD PRESSURE: 100 MMHG | SYSTOLIC BLOOD PRESSURE: 162 MMHG | HEIGHT: 67 IN | BODY MASS INDEX: 45.99 KG/M2

## 2022-06-08 DIAGNOSIS — E11.69 DIABETES MELLITUS TYPE 2 IN OBESE (HCC): ICD-10-CM

## 2022-06-08 DIAGNOSIS — J45.20 MILD INTERMITTENT ASTHMA WITHOUT COMPLICATION: ICD-10-CM

## 2022-06-08 DIAGNOSIS — E66.9 DIABETES MELLITUS TYPE 2 IN OBESE (HCC): ICD-10-CM

## 2022-06-08 DIAGNOSIS — I10 ESSENTIAL (PRIMARY) HYPERTENSION: Primary | ICD-10-CM

## 2022-06-08 DIAGNOSIS — J18.9 COMMUNITY ACQUIRED PNEUMONIA, UNSPECIFIED LATERALITY: ICD-10-CM

## 2022-06-08 PROCEDURE — G8427 DOCREV CUR MEDS BY ELIG CLIN: HCPCS

## 2022-06-08 PROCEDURE — 3044F HG A1C LEVEL LT 7.0%: CPT

## 2022-06-08 PROCEDURE — 99213 OFFICE O/P EST LOW 20 MIN: CPT

## 2022-06-08 PROCEDURE — G8417 CALC BMI ABV UP PARAM F/U: HCPCS

## 2022-06-08 PROCEDURE — 2022F DILAT RTA XM EVC RTNOPTHY: CPT

## 2022-06-08 PROCEDURE — 4004F PT TOBACCO SCREEN RCVD TLK: CPT

## 2022-06-08 RX ORDER — CLOTRIMAZOLE AND BETAMETHASONE DIPROPIONATE 10; .64 MG/G; MG/G
CREAM TOPICAL
Qty: 45 G | Refills: 2 | Status: SHIPPED | OUTPATIENT
Start: 2022-06-08

## 2022-06-08 RX ORDER — ZIPRASIDONE HYDROCHLORIDE 40 MG/1
40 CAPSULE ORAL 2 TIMES DAILY WITH MEALS
Qty: 60 CAPSULE | Refills: 0 | Status: CANCELLED | OUTPATIENT
Start: 2022-06-08

## 2022-06-08 RX ORDER — ACETAMINOPHEN 500 MG
500 TABLET ORAL 3 TIMES DAILY PRN
Qty: 50 TABLET | Refills: 0 | Status: SHIPPED | OUTPATIENT
Start: 2022-06-08

## 2022-06-08 RX ORDER — FLUTICASONE PROPIONATE 50 MCG
1 SPRAY, SUSPENSION (ML) NASAL 2 TIMES DAILY PRN
Qty: 16 G | Refills: 0 | Status: SHIPPED | OUTPATIENT
Start: 2022-06-08

## 2022-06-08 RX ORDER — LEVOFLOXACIN 500 MG/1
500 TABLET, FILM COATED ORAL DAILY
Qty: 7 TABLET | Refills: 0 | Status: SHIPPED | OUTPATIENT
Start: 2022-06-08 | End: 2022-06-15

## 2022-06-08 RX ORDER — HYDROXYZINE PAMOATE 50 MG/1
50 CAPSULE ORAL 3 TIMES DAILY PRN
Qty: 30 CAPSULE | Refills: 0 | Status: CANCELLED | OUTPATIENT
Start: 2022-06-08

## 2022-06-08 RX ORDER — AMLODIPINE BESYLATE 5 MG/1
5 TABLET ORAL DAILY
Qty: 30 TABLET | Refills: 0 | Status: SHIPPED | OUTPATIENT
Start: 2022-06-08 | End: 2022-10-25

## 2022-06-08 RX ORDER — ALBUTEROL SULFATE 90 UG/1
2 AEROSOL, METERED RESPIRATORY (INHALATION) EVERY 6 HOURS PRN
Qty: 1 EACH | Refills: 1 | Status: SHIPPED | OUTPATIENT
Start: 2022-06-08

## 2022-06-08 ASSESSMENT — ENCOUNTER SYMPTOMS
GASTROINTESTINAL NEGATIVE: 1
SORE THROAT: 1
EYE REDNESS: 1
CONSTIPATION: 0
VOICE CHANGE: 0
SINUS PRESSURE: 1
TROUBLE SWALLOWING: 1
VOMITING: 0
ABDOMINAL DISTENTION: 0
STRIDOR: 0
APNEA: 0
SINUS PAIN: 1
EYE DISCHARGE: 0
EYE PAIN: 0
DIARRHEA: 0
SHORTNESS OF BREATH: 0
CHOKING: 0
WHEEZING: 0
ALLERGIC/IMMUNOLOGIC NEGATIVE: 1
COUGH: 1
NAUSEA: 0
COLOR CHANGE: 0
RHINORRHEA: 1
FACIAL SWELLING: 0
ABDOMINAL PAIN: 0
CHEST TIGHTNESS: 0
EYE ITCHING: 0
PHOTOPHOBIA: 0
BACK PAIN: 0

## 2022-06-08 NOTE — PROGRESS NOTES
[facility] [address] [address]  Dept: [phone]  Dept Fax: [phone]    Office Progress/Follow Up Note  Date ofpatient's visit: 6/8/2022  Patient's Name:  Addis Riley YOB: 1990            Patient Care Team:  Omar Ennis MD as PCP - General (Internal Medicine)  NORA Luna CNP as PCP - [facility] Empaneled Provider  Ana Paula Moffett DPM as Physician (Podiatry)  ================================================================    REASON FOR VISIT/CHIEF COMPLAINT:  Cough (x 2 week stuff nose, headache, pt states she took a covid test 5/14/22 while in the er, pt states her body ache) and Diabetes (pt did not take medication today)    HISTORY OF PRESENTING ILLNESS:  History was obtained from: patient, electronic medical record. Wally ramierz 33 y.o. is here for a follow-up visit    She was last seen on 5/24/2022 as a post hospitalization follow-up visit. She was admitted in observation unit on 5/14 for chest pain evaluation. She underwent stress test which was unremarkable. She was started on Norvasc 5 mg for hypertension. Her hemoglobin A1c was 6.8 and was therefore started on metformin 500 mg OD in the last visit. She is compliant with all her medications. She has history of anxiety and bipolar disorder. She is on fluoxetine 40 mg, ziprasidone 40 mg, and trazodone 50 mg    Today, she complains of productive cough (bringing up yellowish phlegm), cold, congestion, sore throat, right ear pain along with fever and chills, ongoing for past 1 week. This morning she had 101.8 fever, took OTC medications which helped transiently. She is COVID vaccinated. Had sick contact around 1.5 week back. She has chest pain, associated with cough and deep breaths. She denies any breathing difficulty. SpO2 is 96% and temp is 98.3. She is diaphoretic on exam.   Pressure is elevated 158/98, repeat after 10 minutes 162/100. She has history of asthma and uses albuterol as needed. As per the patient, she needs it only if she is playing any games like basketball. No nocturnal wheezing episodes. She has history of sinusitis, uses Flonase when symptomatic. Patient Active Problem List   Diagnosis    Asthma    Anxiety and depression    Morbid obesity with BMI of 50.0-59.9, adult ([facility]    Seasonal allergies    Bipolar disorder, mixed ([facility]    Cannabis abuse    Prediabetes    Irregular menses    PCOS (polycystic ovarian syndrome)    ASCUS w/ NEG HR HPV    Mirena IUD insertion 1/7/19    Chronic hypertension    Chest pain       Health Maintenance Due   Topic Date Due    Pneumococcal 0-64 years Vaccine (1 - PCV) Never done    COVID-19 Vaccine (3 - Booster for Pfizer series) 10/12/2021       Allergies   Allergen Reactions    Latex      Rash     Fruit & Vegetable Daily [Nutritional Supplements]      Cataloupe,honeydew,grapes,watermelon,bananas,apples,pineapples    Seasonal          Current Outpatient Medications   Medication Sig Dispense Refill    metFORMIN (GLUCOPHAGE) 500 MG tablet Take 1 tablet by mouth 2 times daily (with meals) 60 tablet 0    clotrimazole-betamethasone (LOTRISONE) 1-0.05 % cream Apply topically 2 times daily.  45 g 2    amLODIPine (NORVASC) 5 MG tablet Take 1 tablet by mouth daily 30 tablet 0    albuterol sulfate HFA (VENTOLIN HFA) 108 (90 Base) MCG/ACT inhaler Inhale 2 puffs into the lungs every 6 hours as needed for Wheezing 1 each 1    acetaminophen (TYLENOL) 500 MG tablet Take 1 tablet by mouth 3 times daily as needed for Pain 50 tablet 0    levoFLOXacin (LEVAQUIN) 500 MG tablet Take 1 tablet by mouth daily for 7 days 7 tablet 0    fluticasone (FLONASE) 50 MCG/ACT nasal spray 1 spray by Each Nostril route 2 times daily as needed for Rhinitis or Allergies 16 g 0    hydrOXYzine (VISTARIL) 50 MG capsule Take 50 mg by mouth 3 times daily as needed      hydrocortisone valerate (WESTCORT) 0.2 % ointment Apply topically daily. 1 Tube 3    FLUoxetine (PROZAC) 40 MG capsule Take 1 capsule by mouth daily 30 capsule 0    ziprasidone (GEODON) 40 MG capsule Take 1 capsule by mouth 2 times daily (with meals) 60 capsule 0    traZODone (DESYREL) 50 MG tablet Take 1 tablet by mouth nightly as needed for Sleep 30 tablet 0     Current Facility-Administered Medications   Medication Dose Route Frequency Provider Last Rate Last Admin    levonorgestrel (MIRENA) IUD 52 mg 1 each  1 each IntraUTERine Once Sharron Public   1 each at 01/07/19 1644       Social History     Tobacco Use    Smoking status: Current Every Day Smoker     Packs/day: 0.50     Years: 10.00     Pack years: 5.00     Types: Cigarettes    Smokeless tobacco: Never Used   Substance Use Topics    Alcohol use: Yes     Comment: socially    Drug use: Yes     Types: Cocaine       Family History   Problem Relation Age of Onset    High Blood Pressure Mother     Diabetes Maternal Grandfather     High Blood Pressure Maternal Grandfather     Schizophrenia Maternal Aunt     Bipolar Disorder Maternal Aunt     Schizophrenia Maternal Aunt     Colon Cancer Neg Hx     Breast Cancer Neg Hx     Uterine Cancer Neg Hx     Ovarian Cancer Neg Hx         Past Medical History:   Diagnosis Date    Asthma     Has not needed inhaler since 2009    B[facility] disorder, mixed ([facility]     Cannabis abuse 12/31/2016    Chronic hypertension 11/25/2019    Depression     Diabetes mellitus ([facility] 11/20/2013    borderline    Obesity 08/01/2013    Polycystic ovarian disease         Past Surgical History:   Procedure Laterality Date    TONSILLECTOMY AND ADENOIDECTOMY      WISDOM TOOTH EXTRACTION          REVIEW OF SYSTEMS:  Review of Systems   Constitutional: Positive for activity change, appetite change, chills, diaphoresis, fatigue and fever. Negative for unexpected weight change.    HENT: Positive for congestion, ear discharge, ear pain, postnasal drip, rhinorrhea, sinus pressure, sinus pain, sneezing, sore throat and trouble swallowing. Negative for facial swelling, hearing loss, tinnitus and voice change. Eyes: Positive for redness. Negative for photophobia, pain, discharge, itching and visual disturbance. Respiratory: Positive for cough. Negative for apnea, choking, chest tightness, shortness of breath, wheezing and stridor. Cardiovascular: Positive for chest pain. Negative for palpitations and leg swelling. Gastrointestinal: Negative. Negative for abdominal distention, abdominal pain, constipation, diarrhea, nausea and vomiting. Endocrine: Negative. Negative for cold intolerance, heat intolerance and polyuria. Genitourinary: Negative. Negative for difficulty urinating, dysuria, frequency and urgency. Musculoskeletal: Positive for arthralgias and myalgias. Negative for back pain and gait problem. Skin: Negative. Negative for color change, pallor, rash and wound. Allergic/Immunologic: Negative. Neurological: Positive for headaches. Negative for dizziness, tremors, seizures, syncope, facial asymmetry, speech difficulty, weakness, light-headedness and numbness. Hematological: Negative. Negative for adenopathy. Does not bruise/bleed easily. Psychiatric/Behavioral: Negative. Negative for agitation, behavioral problems, confusion, decreased concentration, dysphoric mood, hallucinations, self-injury, sleep disturbance and suicidal ideas. The patient is not nervous/anxious and is not hyperactive. PHYSICAL EXAM:  Vitals:    06/08/22 1007 06/08/22 1013   BP: (!) 158/98 (!) 162/100   Site: Right Upper Arm    Position: Sitting    Cuff Size: Large Adult    Pulse: (!) 109 (!) 107   Temp: 97.5 °F (36.4 °C)    SpO2: 99%    Weight: (!) 332 lb 6.4 oz (150.8 kg)    Height: 5' 7\" (1.702 m)      BP Readings from Last 3 Encounters:   06/08/22 (!) 162/100   05/24/22 136/84   05/16/22 125/85        Physical Exam  Vitals and nursing note reviewed. Constitutional:       General: She is not in acute distress. Appearance: Normal appearance. She is obese. She is ill-appearing and diaphoretic. She is not toxic-appearing. HENT:      Head: Normocephalic and atraumatic. Right Ear: Ear canal and external ear normal. Drainage and tenderness present. A middle ear effusion is present. There is no impacted cerumen. Left Ear: Tympanic membrane, ear canal and external ear normal. Drainage present. There is no impacted cerumen. Ears:      Comments: Tenderness on examination of right ear. Clear discharge visible. Slight erythema of right external canal.     Nose: Congestion and rhinorrhea present. Right Turbinates: Swollen. Left Turbinates: Swollen. Mouth/Throat:      Mouth: Mucous membranes are moist.      Pharynx: Oropharynx is clear. Posterior oropharyngeal erythema present. No oropharyngeal exudate. Eyes:      General: No scleral icterus. Right eye: No discharge. Left eye: No discharge. Extraocular Movements: Extraocular movements intact. Conjunctiva/sclera: Conjunctivae normal.      Pupils: Pupils are equal, round, and reactive to light. Neck:      Vascular: No carotid bruit. Cardiovascular:      Rate and Rhythm: Regular rhythm. Tachycardia present. Pulses: Normal pulses. Heart sounds: Normal heart sounds. No murmur heard. No friction rub. No gallop. Pulmonary:      Effort: Pulmonary effort is normal. No respiratory distress. Breath sounds: Normal breath sounds. No stridor. No wheezing, rhonchi or rales. Chest:      Chest wall: No tenderness. Abdominal:      General: Bowel sounds are normal. There is distension. Palpations: Abdomen is soft. There is no mass. Tenderness: There is no abdominal tenderness. There is no right CVA tenderness, left CVA tenderness, guarding or rebound. Hernia: No hernia is present.    Musculoskeletal:         General: No swelling, tenderness, deformity or signs of injury. Normal range of motion. Cervical back: Normal range of motion and neck supple. No rigidity or tenderness. Right lower leg: No edema. Left lower leg: No edema. Lymphadenopathy:      Cervical: No cervical adenopathy. Skin:     General: Skin is warm. Capillary Refill: Capillary refill takes less than 2 seconds. Coloration: Skin is not jaundiced or pale. Findings: No bruising, erythema, lesion or rash. Neurological:      General: No focal deficit present. Mental Status: She is alert and oriented to person, place, and time. Mental status is at baseline. Cranial Nerves: No cranial nerve deficit. Sensory: No sensory deficit. Motor: No weakness. Coordination: Coordination normal.      Gait: Gait normal.      Deep Tendon Reflexes: Reflexes normal.   Psychiatric:         Mood and Affect: Mood normal.         Behavior: Behavior normal.         Thought Content: Thought content normal.         Judgment: Judgment normal.           DIAGNOSTIC FINDINGS:  CBC:  Lab Results   Component Value Date    WBC 10.0 05/14/2022    HGB 12.8 05/14/2022     05/14/2022     06/01/2012       BMP:    Lab Results   Component Value Date     05/15/2022    K 3.8 05/15/2022     05/15/2022    CO2 24 05/15/2022    BUN 13 05/15/2022    CREATININE 1.01 05/15/2022    GLUCOSE 191 05/15/2022    GLUCOSE 157 06/01/2012       HEMOGLOBIN A1C:   Lab Results   Component Value Date    LABA1C 6.8 05/24/2022       FASTING LIPID PANEL:  Lab Results   Component Value Date    CHOL 231 (H) 01/10/2019    HDL 48 01/10/2019    HDL 48 01/10/2019    TRIG 124 01/10/2019        Diagnosis Orders   1. Essential (primary) hypertension  amLODIPine (NORVASC) 5 MG tablet   2.  Community acquired pneumonia, unspecified laterality  albuterol sulfate HFA (VENTOLIN HFA) 108 (90 Base) MCG/ACT inhaler    CBC with Auto Differential    XR CHEST (SINGLE VIEW FRONTAL) levoFLOXacin (LEVAQUIN) 500 MG tablet   3. Diabetes mellitus type 2 in obese (HCC)  metFORMIN (GLUCOPHAGE) 500 MG tablet   4. Mild intermittent asthma without complication  albuterol sulfate HFA (VENTOLIN HFA) 108 (90 Base) MCG/ACT inhaler   5. Body mass index (BMI) 50.0-59.9, adult (HCC)  metFORMIN (GLUCOPHAGE) 500 MG tablet        ASSESSMENT AND PLAN:  Anders Pal was seen today for cough and diabetes. Diagnoses and all orders for this visit:    Essential (primary) hypertension  -     amLODIPine (NORVASC) 5 MG tablet; Take 1 tablet by mouth daily    Community acquired pneumonia, unspecified laterality  -     albuterol sulfate HFA (VENTOLIN HFA) 108 (90 Base) MCG/ACT inhaler; Inhale 2 puffs into the lungs every 6 hours as needed for Wheezing  -     CBC with Auto Differential; Future  -     XR CHEST (SINGLE VIEW FRONTAL); Future  -     levoFLOXacin (LEVAQUIN) 500 MG tablet; Take 1 tablet by mouth daily for 7 days    Diabetes mellitus type 2 in obese (HCC)  -     metFORMIN (GLUCOPHAGE) 500 MG tablet; Take 1 tablet by mouth 2 times daily (with meals)    Mild intermittent asthma without complication  -     albuterol sulfate HFA (VENTOLIN HFA) 108 (90 Base) MCG/ACT inhaler; Inhale 2 puffs into the lungs every 6 hours as needed for Wheezing    Body mass index (BMI) 50.0-59.9, adult ([facility])  -     metFORMIN (GLUCOPHAGE) 500 MG tablet; Take 1 tablet by mouth 2 times daily (with meals)    Sinusitis  -     acetaminophen (TYLENOL) 500 MG tablet; Take 1 tablet by mouth 3 times daily as needed for Pain  -     fluticasone (FLONASE) 50 MCG/ACT nasal spray; 1 spray by Each Nostril route 2 times daily as needed for Rhinitis or Allergies      FOLLOW UP AND INSTRUCTIONS:  Return in about 2 weeks (around 6/22/2022). · Anders Pal received counseling on the following healthy behaviors: nutrition, exercise, medication adherence and tobacco cessation    · Discussed use, benefit, and side effects of prescribed medications.   Barriers to medication compliance addressed. All patient questions answered. Pt voiced understanding. · Patient given educational materials - see patient instructions     Makenzie Doll MD   Internal Medicine  6/8/2022, 12:02 PM    This note is created with the assistance of a speech-recognition program. While intending to generate a document that actually reflects the content of thevisit, the document can still have some mistakes which may not have been identified and corrected by editing.

## 2022-06-08 NOTE — PROGRESS NOTES
Attending Physician Statement  I have discussed the care of Baron Bi, including pertinent history and exam findings,  with the resident. I have reviewed the key elements of all parts of the encounter with the resident. I agree with the assessment, plan and orders as documented by the resident.   (GE Modifier)

## 2022-06-15 ENCOUNTER — TELEPHONE (OUTPATIENT)
Dept: INTERNAL MEDICINE | Age: 32
End: 2022-06-15

## 2022-06-15 NOTE — LETTER
[address]  [address] [facility] [address]  Phone: [phone]  Fax: [phone]    Issac Ball MD        June 20, 2022     Patient: Yesica Nguyen   YOB: 1990   Date of Visit: 6/15/2022       To Whom It May Concern: It is my medical opinion that Lo Alba may return to full duty immediately with no restrictions. If you have any questions or concerns, please don't hesitate to call.     Sincerely,        Issac Ball MD

## 2022-06-15 NOTE — Clinical Note
[address]  [address] [facility] [address]  Phone: [phone]  Fax: [phone]    Nilesh Prado MD        June 20, 2022     Patient: Ghazala Livingston   YOB: 1990   Date of Visit: 6/15/2022       To Whom It May Concern: It is my medical opinion that Nate Hartmann {Work release (duty restriction):36053}. If you have any questions or concerns, please don't hesitate to call.     Sincerely,        Nilesh Prado MD

## 2022-06-15 NOTE — TELEPHONE ENCOUNTER
----- Message from Amy Ramosr sent at 6/15/2022 11:29 AM EDT -----  Subject: Message to Provider    QUESTIONS  Information for Provider? Patient needs a return to work form, wants to go   back as soon as possible. Please call to confirm when it's ready to pick   up.  ---------------------------------------------------------------------------  --------------  CALL BACK INFO  What is the best way for the office to contact you? OK to leave message on   voicemail  Preferred Call Back Phone Number? [phone]  ---------------------------------------------------------------------------  --------------  SCRIPT ANSWERS  Relationship to Patient?  Self

## 2022-06-15 NOTE — TELEPHONE ENCOUNTER
Pt want a return to work letter, pt want to go back to work ASAP, call pt when letter done, pt will come and pick it up.

## 2022-07-06 ENCOUNTER — TELEPHONE (OUTPATIENT)
Dept: INTERNAL MEDICINE | Age: 32
End: 2022-07-06

## 2022-07-06 NOTE — LETTER
[address]  [address] [address]  Phone: [phone]  Fax: [phone]    Mary Waters MD        July 6, 2022     [address]      Dear Ankita Butler: We missed seeing you for a scheduled appointment at [address] [place] [address] with Mary Waters MD on 7/6/2022. We're sorry you were unable to keep your appointment and hope that you are doing well. We ask that you please call 24 hours in advance if you are unable to make your appointment, so that we can give that time to another patient in need. We care about you and the management of your healthcare and want to make sure that you follow up as recommended. To provide quality care and timely appointments to all our patients, you may be dismissed from the practice if you do not show for three (3) scheduled appointments within a 12-month period. We would like to continue treating your healthcare needs. Please call the office to reschedule your appointment, if needed.      Sincerely,        Mary Waters MD

## 2022-07-24 ENCOUNTER — HOSPITAL ENCOUNTER (EMERGENCY)
Age: 32
Discharge: HOME | End: 2022-07-24
Attending: EMERGENCY MEDICINE
Payer: MEDICARE

## 2022-07-24 VITALS
RESPIRATION RATE: 15 BRPM | HEART RATE: 96 BPM | DIASTOLIC BLOOD PRESSURE: 94 MMHG | TEMPERATURE: 98.7 F | SYSTOLIC BLOOD PRESSURE: 144 MMHG | OXYGEN SATURATION: 100 %

## 2022-07-24 DIAGNOSIS — N76.0 BACTERIAL VAGINOSIS: ICD-10-CM

## 2022-07-24 DIAGNOSIS — N30.00 ACUTE CYSTITIS WITHOUT HEMATURIA: Primary | ICD-10-CM

## 2022-07-24 DIAGNOSIS — A59.9 TRICHOMONAS VAGINALIS INFECTION: ICD-10-CM

## 2022-07-24 DIAGNOSIS — B96.89 BACTERIAL VAGINOSIS: ICD-10-CM

## 2022-07-24 LAB
-: ABNORMAL
BILIRUBIN URINE: NEGATIVE
CANDIDA SPECIES, DNA PROBE: NEGATIVE
CASTS UA: ABNORMAL /LPF (ref 0–8)
COLOR: YELLOW
EPITHELIAL CELLS UA: ABNORMAL /HPF (ref 0–5)
GARDNERELLA VAGINALIS, DNA PROBE: POSITIVE
GLUCOSE BLD-MCNC: 182 MG/DL (ref 65–105)
GLUCOSE URINE: NEGATIVE
HCG(URINE) PREGNANCY TEST: NEGATIVE
KETONES, URINE: ABNORMAL
LEUKOCYTE ESTERASE, URINE: ABNORMAL
NITRITE, URINE: NEGATIVE
PH UA: 5.5 (ref 5–8)
PROTEIN UA: ABNORMAL
RBC UA: ABNORMAL /HPF (ref 0–4)
SOURCE: ABNORMAL
SPECIFIC GRAVITY UA: 1.04 (ref 1–1.03)
TRICHOMONAS VAGINALIS DNA: POSITIVE
TURBIDITY: ABNORMAL
URINE HGB: NEGATIVE
UROBILINOGEN, URINE: NORMAL
WBC UA: ABNORMAL /HPF (ref 0–5)

## 2022-07-24 PROCEDURE — 99283 EMERGENCY DEPT VISIT LOW MDM: CPT

## 2022-07-24 PROCEDURE — 87480 CANDIDA DNA DIR PROBE: CPT

## 2022-07-24 PROCEDURE — 87591 N.GONORRHOEAE DNA AMP PROB: CPT

## 2022-07-24 PROCEDURE — 87510 GARDNER VAG DNA DIR PROBE: CPT

## 2022-07-24 PROCEDURE — 87491 CHLMYD TRACH DNA AMP PROBE: CPT

## 2022-07-24 PROCEDURE — 6370000000 HC RX 637 (ALT 250 FOR IP)

## 2022-07-24 PROCEDURE — 82947 ASSAY GLUCOSE BLOOD QUANT: CPT

## 2022-07-24 PROCEDURE — 81025 URINE PREGNANCY TEST: CPT

## 2022-07-24 PROCEDURE — 87660 TRICHOMONAS VAGIN DIR PROBE: CPT

## 2022-07-24 PROCEDURE — 81001 URINALYSIS AUTO W/SCOPE: CPT

## 2022-07-24 RX ORDER — CEPHALEXIN 500 MG/1
500 CAPSULE ORAL 2 TIMES DAILY
Qty: 14 CAPSULE | Refills: 0 | Status: SHIPPED | OUTPATIENT
Start: 2022-07-24 | End: 2022-07-31

## 2022-07-24 RX ORDER — CEPHALEXIN 500 MG/1
500 CAPSULE ORAL ONCE
Status: COMPLETED | OUTPATIENT
Start: 2022-07-24 | End: 2022-07-24

## 2022-07-24 RX ORDER — METRONIDAZOLE 500 MG/1
500 TABLET ORAL 2 TIMES DAILY
Qty: 14 TABLET | Refills: 0 | Status: SHIPPED | OUTPATIENT
Start: 2022-07-24 | End: 2022-07-31

## 2022-07-24 RX ORDER — METRONIDAZOLE 500 MG/1
500 TABLET ORAL ONCE
Status: COMPLETED | OUTPATIENT
Start: 2022-07-24 | End: 2022-07-24

## 2022-07-24 RX ADMIN — CEPHALEXIN 500 MG: 500 CAPSULE ORAL at 21:12

## 2022-07-24 RX ADMIN — METRONIDAZOLE 500 MG: 500 TABLET ORAL at 21:56

## 2022-07-24 ASSESSMENT — ENCOUNTER SYMPTOMS
ABDOMINAL PAIN: 0
VOMITING: 0
SORE THROAT: 0
RHINORRHEA: 0
COUGH: 0
BACK PAIN: 0
SHORTNESS OF BREATH: 0
NAUSEA: 0

## 2022-07-25 NOTE — ED PROVIDER NOTES
[facility] ED     Emergency Department     Faculty Attestation    I performed a history and physical examination of the patient and discussed management with the resident. I reviewed the residents note and agree with the documented findings and plan of care. Any areas of disagreement are noted on the chart. I was personally present for the key portions of any procedures. I have documented in the chart those procedures where I was not present during the key portions. I have reviewed the emergency nurses triage note. I agree with the chief complaint, past medical history, past surgical history, allergies, medications, social and family history as documented unless otherwise noted below. For Physician Assistant/ Nurse Practitioner cases/documentation I have personally evaluated this patient and have completed at least one if not all key elements of the E/M (history, physical exam, and MDM). Additional findings are as noted. Patient with vaginal discharge. Is concern for STIs. Some urinary symptoms as well but no hematuria no vaginal bleeding. No abdominal pain no vomiting.   Well-appearing on exam.  Will check urine, pelvic exam, pregnancy test, anticipate discharge      Critical Care     none    Shawanda Dudley MD, Gab Sheppard  Attending Emergency  Physician           Shawanad Dudley MD  07/24/22 1557

## 2022-07-25 NOTE — ED PROVIDER NOTES
[facility]  ED  Emergency Department Encounter  Emergency Medicine Resident     Pt Baylee Weinberg  MRN: [id number]  Lowgftru 1990  Date of evaluation: 7/24/22  PCP:  Jamia Obrien MD      CHIEF COMPLAINT       Chief Complaint   Patient presents with    Vaginal Itching    Vaginitis       HISTORY OF PRESENT ILLNESS  (Location/Symptom, Timing/Onset, Context/Setting, Quality, Duration, Modifying Factors, Severity.)      Deidre Lama is a 33 y.o. female who presents with vaginal discharge and vaginal itching for the last 2 weeks. Patient describes the discharge as white and creamy. Patient denies any vaginal bleeding. Patient denies any dysuria, hematuria, urinary urgency, increase in urinary frequency, fever, chills, suprapubic tenderness, or abdominal pain. Patient is sexually active with one  female. Patient has a Mirena IUD. PAST MEDICAL / SURGICAL / SOCIAL / FAMILY HISTORY      has a past medical history of Asthma, Bipolar disorder, mixed ([address] Cannabis abuse, Chronic hypertension, Depression, Diabetes mellitus ([address] Obesity, and Polycystic ovarian disease. has a past surgical history that includes Tonsillectomy and adenoidectomy and [place] tooth extraction.       Social History     Socioeconomic History    Marital status: Single     Spouse name: Not on file    Number of children: Not on file    Years of education: Not on file    Highest education level: Not on file   Occupational History    Not on file   Tobacco Use    Smoking status: Every Day     Packs/day: 0.50     Years: 10.00     Pack years: 5.00     Types: Cigarettes    Smokeless tobacco: Never   Substance and Sexual Activity    Alcohol use: Yes     Comment: socially    Drug use: Yes     Types: Cocaine    Sexual activity: Not Currently     Partners: Female, Male   Other Topics Concern    Not on file   Social History Narrative    Not on file     Social Determinants of Health     Financial Resource Strain: Medium Risk    Difficulty of Paying Living Expenses: Somewhat hard   Food Insecurity: Food Insecurity Present    Worried About Running Out of Food in the Last Year: Sometimes true    Ran Out of Food in the Last Year: Sometimes true   Transportation Needs: Not on file   Physical Activity: Not on file   Stress: Not on file   Social Connections: Not on file   Intimate Partner Violence: Not on file   Housing Stability: Not on file       Family History   Problem Relation Age of Onset    High Blood Pressure Mother     Diabetes Maternal Grandfather     High Blood Pressure Maternal Grandfather     Schizophrenia Maternal Aunt     Bipolar Disorder Maternal Aunt     Schizophrenia Maternal Aunt     Colon Cancer Neg Hx     Breast Cancer Neg Hx     Uterine Cancer Neg Hx     Ovarian Cancer Neg Hx        Allergies:  Latex, Fruit & vegetable daily [nutritional supplements], and Seasonal    Home Medications:  Prior to Admission medications    Medication Sig Start Date End Date Taking? Authorizing Provider   cephALEXin (KEFLEX) 500 MG capsule Take 1 capsule by mouth in the morning and 1 capsule before bedtime. Do all this for 7 days. 7/24/22 7/31/22 Yes Carl Maher MD   metroNIDAZOLE (FLAGYL) 500 MG tablet Take 1 tablet by mouth in the morning and 1 tablet before bedtime. Do all this for 7 days. 7/24/22 7/31/22 Yes Carl Maher MD   metFORMIN (GLUCOPHAGE) 500 MG tablet Take 1 tablet by mouth 2 times daily (with meals) 6/8/22   Marely Oliveira MD   clotrimazole-betamethasone (LOTRISONE) 1-0.05 % cream Apply topically 2 times daily.  6/8/22   Marely Oliveira MD   amLODIPine (NORVASC) 5 MG tablet Take 1 tablet by mouth daily 6/8/22   Marely Oliveira MD   albuterol sulfate HFA (VENTOLIN HFA) 108 (90 Base) MCG/ACT inhaler Inhale 2 puffs into the lungs every 6 hours as needed for Wheezing 6/8/22   Marely Oliveira MD   acetaminophen (TYLENOL) 500 MG tablet Take 1 tablet by mouth 3 times daily as needed for Pain 6/8/22   [facility] Samuel Julio MD   fluticasone Laury Felice) 50 MCG/ACT nasal spray 1 spray by Each Nostril route 2 times daily as needed for Rhinitis or Allergies 6/8/22   Aylin Martínez MD   hydrOXYzine (VISTARIL) 50 MG capsule Take 50 mg by mouth 3 times daily as needed 1/20/21   Historical Provider, MD   hydrocortisone valerate (WESTCORT) 0.2 % ointment Apply topically daily. 11/13/20   Sammuel Code, DPM   FLUoxetine (PROZAC) 40 MG capsule Take 1 capsule by mouth daily 1/8/17   Mary Ann Gracia MD   ziprasidone (GEODON) 40 MG capsule Take 1 capsule by mouth 2 times daily (with meals) 1/8/17   Mary Ann Gracia MD   traZODone (DESYREL) 50 MG tablet Take 1 tablet by mouth nightly as needed for Sleep 1/8/17   Mary Ann Gracia MD       REVIEW OF SYSTEMS    (2-9 systems for level 4, 10 or more for level 5)      Review of Systems   Constitutional:  Negative for chills and fever. HENT:  Negative for rhinorrhea and sore throat. Eyes:  Negative for visual disturbance. Respiratory:  Negative for cough and shortness of breath. Cardiovascular:  Negative for chest pain. Gastrointestinal:  Negative for abdominal pain, nausea and vomiting. No suprapubic tenderness. Genitourinary:  Positive for vaginal discharge. Negative for dysuria, flank pain, frequency, hematuria, urgency and vaginal bleeding. Musculoskeletal:  Negative for back pain. Skin:  Negative for rash. Neurological:  Negative for dizziness, light-headedness and headaches. PHYSICAL EXAM   (up to 7 for level 4, 8 or more for level 5)      INITIAL VITALS:   BP (!) 144/94   Pulse 96   Temp 98.7 °F (37.1 °C) (Oral)   Resp 15   SpO2 100%     Physical Exam  Exam conducted with a chaperone present (Blanquita Rosario RN present. ). Constitutional:       General: She is not in acute distress. Appearance: Normal appearance. She is obese. HENT:      Head: Normocephalic and atraumatic.       Right Ear: External ear normal.      Left Ear: External ear normal.      Nose: Nose normal.      Mouth/Throat:      Mouth: Mucous membranes are moist.      Pharynx: No oropharyngeal exudate or posterior oropharyngeal erythema. Eyes:      Extraocular Movements: Extraocular movements intact. Conjunctiva/sclera: Conjunctivae normal.   Cardiovascular:      Rate and Rhythm: Normal rate. Heart sounds: Normal heart sounds. Pulmonary:      Effort: Pulmonary effort is normal.      Breath sounds: Normal breath sounds. Abdominal:      General: Abdomen is flat. Palpations: Abdomen is soft. Tenderness: There is no abdominal tenderness. There is no right CVA tenderness or left CVA tenderness. Comments: Obese abdomen. Genitourinary:     Comments: White, creamy vaginal discharge seen with foul-smelling odor. Cervix nonerythematous. Mirena IUD strings visualized. No adnexal or uterine tenderness during bimanual exam.  Musculoskeletal:         General: Normal range of motion. Cervical back: Normal range of motion. Skin:     Findings: No rash. Neurological:      General: No focal deficit present. Mental Status: She is alert and oriented to person, place, and time. DIFFERENTIAL  DIAGNOSIS     PLAN (LABS / IMAGING / EKG):  Orders Placed This Encounter   Procedures    Vaginitis DNA Probe    C.trachomatis N.gonorrhoeae DNA    Urinalysis with Microscopic    PREGNANCY, URINE    Vaginal exam    POC Glucose Fingerstick       MEDICATIONS ORDERED:  Orders Placed This Encounter   Medications    cephALEXin (KEFLEX) capsule 500 mg     Order Specific Question:   Antimicrobial Indications     Answer:   Urinary Tract Infection    metroNIDAZOLE (FLAGYL) tablet 500 mg     Order Specific Question:   Antimicrobial Indications     Answer:   OB/Gyn Infection     Order Specific Question:   Antimicrobial Indications     Answer:   STD infection    cephALEXin (KEFLEX) 500 MG capsule     Sig: Take 1 capsule by mouth in the morning and 1 capsule before bedtime. Do all this for 7 days. Dispense:  14 capsule     Refill:  0    metroNIDAZOLE (FLAGYL) 500 MG tablet     Sig: Take 1 tablet by mouth in the morning and 1 tablet before bedtime. Do all this for 7 days. Dispense:  14 tablet     Refill:  0       DDX: UTI, STD, vaginitis, pregnancy    DIAGNOSTIC RESULTS / EMERGENCY DEPARTMENT COURSE / MDM   LAB RESULTS:  Results for orders placed or performed during the hospital encounter of 07/24/22   Vaginitis DNA Probe    Specimen: Vaginal   Result Value Ref Range    Source . VAGINAL SWAB     Trichomonas Vaginalis DNA POSITIVE (A) NEGATIVE    GARDNERELLA VAGINALIS, DNA PROBE POSITIVE (A) NEGATIVE    CANDIDA SPECIES, DNA PROBE NEGATIVE NEGATIVE   Urinalysis with Microscopic   Result Value Ref Range    Color, UA Yellow Yellow    Turbidity UA Cloudy (A) Clear    Glucose, Ur NEGATIVE NEGATIVE    Bilirubin Urine NEGATIVE NEGATIVE    Ketones, Urine SMALL (A) NEGATIVE    Specific Gravity, UA 1.037 (H) 1.005 - 1.030    Urine Hgb NEGATIVE NEGATIVE    pH, UA 5.5 5.0 - 8.0    Protein, UA TRACE (A) NEGATIVE    Urobilinogen, Urine Normal Normal    Nitrite, Urine NEGATIVE NEGATIVE    Leukocyte Esterase, Urine LARGE (A) NEGATIVE    -          WBC, UA 50  0 - 5 /HPF    RBC, UA 2 TO 5 0 - 4 /HPF    Casts UA  0 - 8 /LPF     10 TO 20 HYALINE Reference range defined for non-centrifuged specimen. Epithelial Cells UA 10 TO 20 0 - 5 /HPF   PREGNANCY, URINE   Result Value Ref Range    HCG(Urine) Pregnancy Test NEGATIVE NEGATIVE   POC Glucose Fingerstick   Result Value Ref Range    POC Glucose 182 (H) 65 - 105 mg/dL       IMPRESSION: 36-year-old female who presents with vaginal discharge and vaginal itching for the last 2 weeks. UA positive for UTI, will discharge on Keflex. Vaginitis panel positive for trichomonas and bacterial vaginosis, will discharge on Flagyl. Urine pregnancy negative. [facility] check the results of her gonorrhea/chlamydia.   Patient informed to abstain from sex for at least a week and have her partner tested. EMERGENCY DEPARTMENT COURSE:  ED Course as of 07/24/22 2204   Sun Jul 24, 2022 2128 Reevaluated patient. Patient has MyChart and encouraged her to follow-up in 2 to 3 days to see if gonorrhea/chlamydia results are back before being treated. Patient is agreeable. [KR]   2155 Reevaluated patient and updated her of her positive trichomonas and Gardnerella results. Patient is to abstain from sex for at least a week. Encouraged her to have her partner also tested. [KR]      ED Course User Index  [KR] Yassine Greenberg MD     No notes of [facility] Admission Criteria type on file. FINAL IMPRESSION      1. Acute cystitis without hematuria    2. Bacterial vaginosis    3. Trichomonas vaginalis infection          DISPOSITION / PLAN     DISPOSITION Decision To Discharge 07/24/2022 09:19:11 PM      PATIENT REFERRED TO:  [place] [id number]  [phone]    Schedule an appointment as soon as possible for a visit       DISCHARGE MEDICATIONS:  New Prescriptions    CEPHALEXIN (KEFLEX) 500 MG CAPSULE    Take 1 capsule by mouth in the morning and 1 capsule before bedtime. Do all this for 7 days. METRONIDAZOLE (FLAGYL) 500 MG TABLET    Take 1 tablet by mouth in the morning and 1 tablet before bedtime. Do all this for 7 days.        Yassine Greenberg MD  Emergency Medicine Resident    (Please note that portions of thisnote were completed with a voice recognition program.  Efforts were made to edit the dictations but occasionally words are mis-transcribed.)      Yassine Greenberg MD  Resident  07/24/22 2206

## 2022-07-25 NOTE — DISCHARGE INSTRUCTIONS
You have been seen in the emergency department and are being treated for a urinary tract infection, trichomonas, and bacterial vaginosis. Please take the full course of antibiotics. Please have your partner also get tested and abstain from sex for at least one week. Please follow-up with your primary care provider as needed. If your symptoms worsen, please come back to the emergency department .

## 2022-08-26 ENCOUNTER — HOSPITAL ENCOUNTER (EMERGENCY)
Age: 32
Discharge: HOME | End: 2022-08-26
Attending: EMERGENCY MEDICINE
Payer: MEDICARE

## 2022-08-26 VITALS
SYSTOLIC BLOOD PRESSURE: 149 MMHG | DIASTOLIC BLOOD PRESSURE: 99 MMHG | HEART RATE: 85 BPM | RESPIRATION RATE: 18 BRPM | TEMPERATURE: 98.4 F | OXYGEN SATURATION: 100 %

## 2022-08-26 DIAGNOSIS — B37.9 YEAST INFECTION: Primary | ICD-10-CM

## 2022-08-26 LAB
BILIRUBIN URINE: NEGATIVE
CANDIDA SPECIES, DNA PROBE: POSITIVE
CASTS UA: ABNORMAL /LPF (ref 0–8)
COLOR: YELLOW
EPITHELIAL CELLS UA: ABNORMAL /HPF (ref 0–5)
GARDNERELLA VAGINALIS, DNA PROBE: NEGATIVE
GLUCOSE URINE: NEGATIVE
HCG(URINE) PREGNANCY TEST: NEGATIVE
KETONES, URINE: NEGATIVE
LEUKOCYTE ESTERASE, URINE: ABNORMAL
NITRITE, URINE: NEGATIVE
PH UA: 6 (ref 5–8)
PROTEIN UA: NEGATIVE
RBC UA: ABNORMAL /HPF (ref 0–4)
SOURCE: ABNORMAL
SPECIFIC GRAVITY UA: 1.02 (ref 1–1.03)
TRICHOMONAS VAGINALIS DNA: NEGATIVE
TURBIDITY: CLEAR
URINE HGB: NEGATIVE
UROBILINOGEN, URINE: NORMAL
WBC UA: ABNORMAL /HPF (ref 0–5)

## 2022-08-26 PROCEDURE — 81001 URINALYSIS AUTO W/SCOPE: CPT

## 2022-08-26 PROCEDURE — 87591 N.GONORRHOEAE DNA AMP PROB: CPT

## 2022-08-26 PROCEDURE — 87491 CHLMYD TRACH DNA AMP PROBE: CPT

## 2022-08-26 PROCEDURE — 87510 GARDNER VAG DNA DIR PROBE: CPT

## 2022-08-26 PROCEDURE — 87660 TRICHOMONAS VAGIN DIR PROBE: CPT

## 2022-08-26 PROCEDURE — 87480 CANDIDA DNA DIR PROBE: CPT

## 2022-08-26 PROCEDURE — 99283 EMERGENCY DEPT VISIT LOW MDM: CPT

## 2022-08-26 PROCEDURE — 86403 PARTICLE AGGLUT ANTBDY SCRN: CPT

## 2022-08-26 PROCEDURE — 6370000000 HC RX 637 (ALT 250 FOR IP): Performed by: STUDENT IN AN ORGANIZED HEALTH CARE EDUCATION/TRAINING PROGRAM

## 2022-08-26 PROCEDURE — 81025 URINE PREGNANCY TEST: CPT

## 2022-08-26 PROCEDURE — 87086 URINE CULTURE/COLONY COUNT: CPT

## 2022-08-26 RX ORDER — FLUCONAZOLE 50 MG/1
150 TABLET ORAL ONCE
Status: COMPLETED | OUTPATIENT
Start: 2022-08-26 | End: 2022-08-26

## 2022-08-26 RX ORDER — ACETAMINOPHEN 500 MG
1000 TABLET ORAL ONCE
Status: COMPLETED | OUTPATIENT
Start: 2022-08-26 | End: 2022-08-26

## 2022-08-26 RX ADMIN — ACETAMINOPHEN 1000 MG: 500 TABLET ORAL at 18:31

## 2022-08-26 RX ADMIN — FLUCONAZOLE 150 MG: 50 TABLET ORAL at 18:31

## 2022-08-26 ASSESSMENT — ENCOUNTER SYMPTOMS
NAUSEA: 0
DIARRHEA: 0
VOMITING: 0
BACK PAIN: 0
ABDOMINAL PAIN: 0
SHORTNESS OF BREATH: 0

## 2022-08-26 ASSESSMENT — PAIN SCALES - GENERAL: PAINLEVEL_OUTOF10: 4

## 2022-08-26 NOTE — ED PROVIDER NOTES
I performed a history and physical examination of the patient and discussed management with the resident. I reviewed the residents note and agree with the documented findings and plan of care. Any areas of disagreement are noted on the chart. I was personally present for the key portions of any procedures. I have documented in the chart those procedures where I was not present during the key portions. I have reviewed the emergency nurses triage note. I agree with the chief complaint, past medical history, past surgical history, allergies, medications, social and family history as documented unless otherwise noted below. Documentation of the HPI, Physical Exam and Medical Decision Making performed by medical students or scribes is based on my personal performance of the HPI, PE and MDM. For Phys Assistant/ Nurse Practitioner cases/documentation I have personally evaluated this patient and have completed at least one if not all key elements of the E/M (history, physical exam, and MDM). I find the patient's history and physical exam are consistent with the NP/PA documentation. I agree with the care provided, treatment rendered, disposition and followup plan. Additional findings are as noted. Minda Rico. Yusef Colbert MD  Attending Emergency  Physician    This patient presents to the emerged part with complaints of vaginal itching and discharge for the past several days. She denies any dysuria, hematuria, frequency, urgency. No abnormal vaginal bleeding. No abdominal pain. No nausea, vomiting, diarrhea. No fevers or chills. Patient reports she is not sexually active and has never been pregnant. She currently has an IUD in place. Awake, alert, coop, responsive. Speech fluent, normal comprehension. Lungs clear bilaterally. No rales, rhonchi, wheezes, stridor, retractions. Cardiac-S1S2, RRR, no murmur, rub, or gallop. Abdomen soft, nondistended, nontender. No organomegaly, mass, bruit. Normal bowel sounds. No CVA tenderness. Pelvic exam was performed by Dr. Addie Mercado and we are currently awaiting results of the pelvic labs. Main Junior MD  08/26/22 7855    Patient was signed out to Dr. Alejandra Heller at the completion of my shift.      Main Junior MD  08/26/22 [id number]

## 2022-08-26 NOTE — ED PROVIDER NOTES
Care of Jenelle Calle was assumed from previous attending and is being seen for Vaginal Itching  . The patient's initial evaluation and plan have been discussed with the prior provider who initially evaluated the patient. Handoff taken on the following patient from prior Attending Physician:    Keyonna Lim    I was available and discussed any additional care issues that arose and coordinated the management plans with the resident(s) caring for the patient during my duty period. Any areas of disagreement with residents documentation of care or procedures are noted on the chart. I was personally present for the key portions of any/all procedures during my duty period. I have documented in the chart those procedures where I was not present during the key portions. EMERGENCY DEPARTMENT COURSE / MEDICAL DECISION MAKING:       MEDICATIONS GIVEN:  Orders Placed This Encounter   Medications    fluconazole (DIFLUCAN) tablet 150 mg       LABS / RADIOLOGY:     Labs Reviewed   VAGINITIS DNA PROBE - Abnormal; Notable for the following components:       Result Value    KIAH SPECIES, DNA PROBE POSITIVE (*)     All other components within normal limits   URINALYSIS WITH MICROSCOPIC - Abnormal; Notable for the following components:    Leukocyte Esterase, Urine SMALL (*)     All other components within normal limits   C.TRACHOMATIS N.GONORRHOEAE DNA   CULTURE, URINE   PREGNANCY, URINE       No results found. RECENT VITALS:     Temp: 98.4 °F (36.9 °C),  Heart Rate: 85, Resp: 18, BP: (!) 149/99, SpO2: 100 %    This patient is a 33 y.o.  Female with vaginal itching, pelvic swabs showing candida, treated with fluconazole, and then discharged    [facility] [address] / RECOMMENDATIONS:    Pelvic swabs, and then d/c      Luiza Glover DO, DO  Attending Emergency Physician  [address] [facility]        Nayeli Nixon DO  08/26/22 6017

## 2022-08-26 NOTE — ED PROVIDER NOTES
[facility] ED  Emergency Department Encounter  Emergency Medicine Resident     Pt Lali Huertas  MRN: [id number]  [place] 1990  Date of evaluation: 8/26/22  PCP:  Edith Arenas MD      CHIEF COMPLAINT       Chief Complaint   Patient presents with    Vaginal Itching       HISTORY OF PRESENT ILLNESS  (Location/Symptom, Timing/Onset, Context/Setting, Quality, Duration, Modifying Factors, Severity.)      Donavon Barraza is a 33 y.o. female who presents with complaint of vaginal discharge, vaginal itching as well as dysuria. Past medical history significant for asthma as well as bipolar disorder and hypertension diabetes and PCOS. Patient states that for the past 2 days she had worsening vaginal itching as well as dysuria but denies any hematuria or vaginal bleeding. Denies any fever, chills, nausea, vomiting or diarrhea, chest pain, shortness of breath, lower abdominal pain. Also denies any back pain. PAST MEDICAL / SURGICAL / SOCIAL / FAMILY HISTORY      has a past medical history of Asthma, Bipolar disorder, mixed ([address] Cannabis abuse, Chronic hypertension, Depression, Diabetes mellitus ([facility] [address] Obesity, and Polycystic ovarian disease. has a past surgical history that includes Tonsillectomy and adenoidectomy and [place] tooth extraction.       Social History     Socioeconomic History    Marital status: Single     Spouse name: Not on file    Number of children: Not on file    Years of education: Not on file    Highest education level: Not on file   Occupational History    Not on file   Tobacco Use    Smoking status: Every Day     Packs/day: 0.50     Years: 10.00     Pack years: 5.00     Types: Cigarettes    Smokeless tobacco: Never   Substance and Sexual Activity    Alcohol use: Yes     Comment: socially    Drug use: Yes     Types: Cocaine    Sexual activity: Not Currently     Partners: Female, Male   Other Topics Concern    Not on file   Social History Narrative    Not on file     Social Determinants of Health     Financial Resource Strain: Medium Risk    Difficulty of Paying Living Expenses: Somewhat hard   Food Insecurity: Food Insecurity Present    Worried About Running Out of Food in the Last Year: Sometimes true    Ran Out of Food in the Last Year: Sometimes true   Transportation Needs: Not on file   Physical Activity: Not on file   Stress: Not on file   Social Connections: Not on file   Intimate Partner Violence: Not on file   Housing Stability: Not on file       Family History   Problem Relation Age of Onset    High Blood Pressure Mother     Diabetes Maternal Grandfather     High Blood Pressure Maternal Grandfather     Schizophrenia Maternal Aunt     Bipolar Disorder Maternal Aunt     Schizophrenia Maternal Aunt     Colon Cancer Neg Hx     Breast Cancer Neg Hx     Uterine Cancer Neg Hx     Ovarian Cancer Neg Hx        Allergies:  Latex, Fruit & vegetable daily [nutritional supplements], and Seasonal    Home Medications:  Prior to Admission medications    Medication Sig Start Date End Date Taking? Authorizing Provider   metFORMIN (GLUCOPHAGE) 500 MG tablet Take 1 tablet by mouth 2 times daily (with meals) 6/8/22   Divina Gupta MD   clotrimazole-betamethasone (LOTRISONE) 1-0.05 % cream Apply topically 2 times daily.  6/8/22   Divina Gupta MD   amLODIPine (NORVASC) 5 MG tablet Take 1 tablet by mouth daily 6/8/22   Divina Gupta MD   albuterol sulfate HFA (VENTOLIN HFA) 108 (90 Base) MCG/ACT inhaler Inhale 2 puffs into the lungs every 6 hours as needed for Wheezing 6/8/22   Divina Gupta MD   acetaminophen (TYLENOL) 500 MG tablet Take 1 tablet by mouth 3 times daily as needed for Pain 6/8/22   Divina Gupta MD   fluticasone [facility]) 50 MCG/ACT nasal spray 1 spray by Each Nostril route 2 times daily as needed for Rhinitis or Allergies 6/8/22   Charley Whitehead MD   hydrOXYzine (VISTARIL) 50 MG capsule Take 50 mg by mouth 3 times daily as needed 1/20/21 Historical Provider, MD   hydrocortisone valerate (WESTCORT) 0.2 % ointment Apply topically daily. 11/13/20   Vinicius Erazo DPM   FLUoxetine (PROZAC) 40 MG capsule Take 1 capsule by mouth daily 1/8/17   Aj Neri MD   ziprasidone (GEODON) 40 MG capsule Take 1 capsule by mouth 2 times daily (with meals) 1/8/17   Aj Neri MD   traZODone (DESYREL) 50 MG tablet Take 1 tablet by mouth nightly as needed for Sleep 1/8/17   Aj Neri MD       REVIEW OF SYSTEMS    (2-9 systems for level 4, 10 or more for level 5)      Review of Systems   Constitutional:  Negative for chills. Respiratory:  Negative for shortness of breath. Cardiovascular:  Negative for chest pain. Gastrointestinal:  Negative for abdominal pain, diarrhea, nausea and vomiting. Genitourinary:  Positive for dysuria and vaginal discharge. Negative for hematuria, vaginal bleeding and vaginal pain. Musculoskeletal:  Negative for back pain and neck pain. Skin:  Negative for rash and wound. Neurological:  Negative for weakness and headaches. PHYSICAL EXAM   (up to 7 for level 4, 8 or more for level 5)      INITIAL VITALS:   BP (!) 149/99   Pulse 85   Temp 98.4 °F (36.9 °C) (Oral)   Resp 18   SpO2 100%     Physical Exam  Exam conducted with a chaperone present Keven Shaferpool (nurse)). Constitutional:       General: She is not in acute distress. Appearance: She is not ill-appearing, toxic-appearing or diaphoretic. Cardiovascular:      Rate and Rhythm: Normal rate and regular rhythm. Heart sounds: No murmur heard. No friction rub. No gallop. Pulmonary:      Effort: No respiratory distress. Breath sounds: No stridor. No wheezing, rhonchi or rales. Chest:      Chest wall: No tenderness. Abdominal:      General: There is no distension. Palpations: There is no mass. Tenderness: There is no abdominal tenderness. There is no guarding or rebound. Hernia: No hernia is present.    Genitourinary:     Vagina: Vaginal discharge present. No erythema, tenderness, bleeding, lesions or prolapsed vaginal walls. Cervix: Discharge present. No cervical motion tenderness, friability, lesion, erythema, cervical bleeding or eversion. Adnexa: Right adnexa normal and left adnexa normal.        Right: No mass, tenderness or fullness. Left: No mass, tenderness or fullness. Comments: Mild vaginal discharge, white appearing, no bleeding or tenderness on palpation  Musculoskeletal:         General: No swelling, tenderness, deformity or signs of injury. Skin:     Capillary Refill: Capillary refill takes less than 2 seconds. Coloration: Skin is not jaundiced or pale. Findings: No bruising, erythema, lesion or rash. Neurological:      Mental Status: She is oriented to person, place, and time. DIFFERENTIAL  DIAGNOSIS     PLAN (LABS / IMAGING / EKG):  Orders Placed This Encounter   Procedures    Vaginitis DNA Probe    C.trachomatis N.gonorrhoeae DNA    Culture, Urine    Urinalysis with Microscopic    PREGNANCY, URINE       MEDICATIONS ORDERED:  Orders Placed This Encounter   Medications    fluconazole (DIFLUCAN) tablet 150 mg    acetaminophen (TYLENOL) tablet 1,000 mg       DDX: BV, UTI, STD, TOA    DIAGNOSTIC RESULTS / EMERGENCY DEPARTMENT COURSE / MDM   LAB RESULTS:  Results for orders placed or performed during the hospital encounter of 08/26/22   Vaginitis DNA Probe    Specimen: Vaginal   Result Value Ref Range    Source . VAGINAL SWAB     Trichomonas Vaginalis DNA NEGATIVE NEGATIVE    GARDNERELLA VAGINALIS, DNA PROBE NEGATIVE NEGATIVE    CANDIDA SPECIES, DNA PROBE POSITIVE (A) NEGATIVE   Urinalysis with Microscopic   Result Value Ref Range    Color, UA Yellow Yellow    Turbidity UA Clear Clear    Glucose, Ur NEGATIVE NEGATIVE    Bilirubin Urine NEGATIVE NEGATIVE    Ketones, Urine NEGATIVE NEGATIVE    Specific Gravity, UA 1.022 1.005 - 1.030    Urine Hgb NEGATIVE NEGATIVE    pH, UA 6.0 5.0 - 8.0 Protein, UA NEGATIVE NEGATIVE    Urobilinogen, Urine Normal Normal    Nitrite, Urine NEGATIVE NEGATIVE    Leukocyte Esterase, Urine SMALL (A) NEGATIVE    WBC, UA 10 TO 20 0 - 5 /HPF    RBC, UA None 0 - 4 /HPF    Casts UA  0 - 8 /LPF     5 TO 10 HYALINE Reference range defined for non-centrifuged specimen. Epithelial Cells UA 5 TO 10 0 - 5 /HPF   PREGNANCY, URINE   Result Value Ref Range    HCG(Urine) Pregnancy Test NEGATIVE NEGATIVE       IMPRESSION: Labs consistent with yeast infection    RADIOLOGY:  No orders to display         EMERGENCY DEPARTMENT COURSE:  43-year-old female present chief complaint of vaginal itching as well as some dysuria. Examination patient no acute distress, nontoxic-appearing. Laboratory testing showing acute yeast infection. No adnexal tenderness on vaginal exam.  Low suspicion for torsion or TOA. There was some discharge noted however this was not purulent nature and patient no cervical motion tenderness therefore low suspicion for PID. Urinalysis was slightly borderline but sent for culture. Discussed with patient that we send her urine for culture and that if it grows something we will call in a prescription for her. Patient minimalist plan was given a dose of oral fluconazole discharge PCP follow-up as well as strict return precautions. No notes of  Admission Criteria type on file. PROCEDURES:  N/a    CONSULTS:  None    CRITICAL CARE:  N/a         FINAL IMPRESSION      1.  Yeast infection          DISPOSITION / PLAN     DISPOSITION Decision To Discharge 08/26/2022 06:21:11 PM      PATIENT REFERRED TO:  Davida Patel[address] [address] [phone]    Schedule an appointment as soon as possible for a visit       DISCHARGE MEDICATIONS:  Discharge Medication List as of 8/26/2022  6:22 PM          Kathy Soto DO  Emergency Medicine Resident    (Please note that portions of thisnote were completed with a voice recognition program.  Efforts were made to edit the dictations but occasionally words are mis-transcribed.)        Darlyn Beaulieu DO  Resident  08/26/22 [id number]

## 2022-08-26 NOTE — ED NOTES
Pt arrived with report of vaginal itching and burning while urinating x 1 week   Pt denies any changes in urination,  Denies concern for STDs, denies vaginal d/c or vaginal bleeding  Pt A&Ox4, RR even/unlabored, call light within reach      [facility], RN  08/26/22 [id number]

## 2022-08-26 NOTE — DISCHARGE INSTRUCTIONS
Please follow-up with your primary care physician. Your labs showed that this is a yeast infection. We sent your urine for culture if it grows anything we will call you and call in an antibiotic. To the emergency department for develop any severe worsening of symptoms, chest pain, shortness of breath, fevers chills uncontrolled by Tylenol or Motrin, intractable nausea or vomiting or any other general concerns.

## 2022-08-26 NOTE — ED NOTES
The following labs were labeled with appropriate pt sticker and tubed to lab:     [] Blue     [] Lavender   [] on ice  [] Green/yellow  [] Green/black [] on ice  [] Mary Kuhn  [] on ice  [] Yellow  [] Red  [] Pink  [] ABG  [] VBG    [] COVID-19 swab    [] Rapid  [] PCR  [] Flu swab  [] Peds Viral Panel     [x] Urine Sample  [x] Pelvic Cultures  [] Blood Cultures   [] STREP Cultures       Shira eHrrera LPN  90/83/32 2257

## 2022-08-27 LAB
CULTURE: ABNORMAL
SPECIMEN DESCRIPTION: ABNORMAL

## 2022-08-29 NOTE — PROGRESS NOTES
Reviewed patient's urine culture - culture positive for streptococci beta hemolytic group B. Patient was discharged on no antibiotic therapy at discharge. Therapy appropriate and no changes made.     Thank you,     Shane Zelaya, PharmD Candidate [id number]

## 2022-09-23 ENCOUNTER — OFFICE VISIT (OUTPATIENT)
Dept: PODIATRY | Age: 32
End: 2022-09-23
Payer: MEDICARE

## 2022-09-23 VITALS — BODY MASS INDEX: 45.99 KG/M2 | WEIGHT: 293 LBS | HEIGHT: 67 IN

## 2022-09-23 DIAGNOSIS — E11.51 TYPE II DIABETES MELLITUS WITH PERIPHERAL CIRCULATORY DISORDER (HCC): Primary | ICD-10-CM

## 2022-09-23 DIAGNOSIS — M79.604 PAIN IN BOTH LOWER EXTREMITIES: ICD-10-CM

## 2022-09-23 DIAGNOSIS — B35.3 TINEA PEDIS OF BOTH FEET: ICD-10-CM

## 2022-09-23 DIAGNOSIS — M79.605 PAIN IN BOTH LOWER EXTREMITIES: ICD-10-CM

## 2022-09-23 DIAGNOSIS — I73.9 PVD (PERIPHERAL VASCULAR DISEASE) (HCC): ICD-10-CM

## 2022-09-23 PROCEDURE — 3044F HG A1C LEVEL LT 7.0%: CPT | Performed by: PODIATRIST

## 2022-09-23 PROCEDURE — G8427 DOCREV CUR MEDS BY ELIG CLIN: HCPCS | Performed by: PODIATRIST

## 2022-09-23 PROCEDURE — 99213 OFFICE O/P EST LOW 20 MIN: CPT | Performed by: PODIATRIST

## 2022-09-23 PROCEDURE — 4004F PT TOBACCO SCREEN RCVD TLK: CPT | Performed by: PODIATRIST

## 2022-09-23 PROCEDURE — 2022F DILAT RTA XM EVC RTNOPTHY: CPT | Performed by: PODIATRIST

## 2022-09-23 PROCEDURE — G8417 CALC BMI ABV UP PARAM F/U: HCPCS | Performed by: PODIATRIST

## 2022-09-23 RX ORDER — CLOTRIMAZOLE AND BETAMETHASONE DIPROPIONATE 10; .64 MG/G; MG/G
CREAM TOPICAL
Qty: 45 G | Refills: 2 | Status: SHIPPED | OUTPATIENT
Start: 2022-09-23

## 2022-09-23 NOTE — PROGRESS NOTES
[address] [facility] PODIATRY [facility]  [address] [address] [address]  Dept: [phone]  Dept Fax: [phone]    DIABETIC PROGRESS NOTE  Date of patient's visit: 9/23/2022  Patient's Name:  Lindsay Gallegos YOB: 1990            Patient Care Team:  Arnulfo Yao MD as PCP - General (Internal Medicine)  Wade Schmitz DPM as Physician (Podiatry)          Chief Complaint   Patient presents with    Diabetes     Diabetic foot care  [address]    Peripheral Neuropathy     Bl feet       Subjective:   Lindsay Gallegos comes to clinic for Diabetes (Diabetic foot care/[address]) and Peripheral Neuropathy (Bl feet)    she is a diabetic and states that there is increased dry itchy skin to left foot. Pt currently has complaint of thickened, elongated nails. Pt's primary care physician is Arnulfo Yao MD last seen June 8 2022   Pt's last blood sugar was 182 . Patient states she is also having pain in between her toes on her left foot. The pain is rated a 10 out of 10 this morning. She states the pain has been present for 2 weeks. Lab Results   Component Value Date    LABA1C 6.8 05/24/2022      Complains of numbness in the feet bilat.   Past Medical History:   Diagnosis Date    Asthma     Has not needed inhaler since 2009    Bipolar disorder, mixed ([facility] [address]     Cannabis abuse 12/31/2016    Chronic hypertension 11/25/2019    Depression     Diabetes mellitus ([facility] [address] 11/20/2013    borderline    Obesity 08/01/2013    Polycystic ovarian disease        Allergies   Allergen Reactions    Latex      Rash     Fruit & Vegetable Daily [Nutritional Supplements]      Cataloupe,honeydew,grapes,watermelon,bananas,apples,pineapples    Seasonal      Current Outpatient Medications on File Prior to Visit   Medication Sig Dispense Refill    metFORMIN (GLUCOPHAGE) 500 MG tablet Take 1 tablet by mouth 2 times daily (with meals) 60 tablet 0 clotrimazole-betamethasone (LOTRISONE) 1-0.05 % cream Apply topically 2 times daily. 45 g 2    amLODIPine (NORVASC) 5 MG tablet Take 1 tablet by mouth daily 30 tablet 0    albuterol sulfate HFA (VENTOLIN HFA) 108 (90 Base) MCG/ACT inhaler Inhale 2 puffs into the lungs every 6 hours as needed for Wheezing 1 each 1    acetaminophen (TYLENOL) 500 MG tablet Take 1 tablet by mouth 3 times daily as needed for Pain 50 tablet 0    fluticasone (FLONASE) 50 MCG/ACT nasal spray 1 spray by Each Nostril route 2 times daily as needed for Rhinitis or Allergies 16 g 0    hydrOXYzine (VISTARIL) 50 MG capsule Take 50 mg by mouth 3 times daily as needed      hydrocortisone valerate (WESTCORT) 0.2 % ointment Apply topically daily. 1 Tube 3    FLUoxetine (PROZAC) 40 MG capsule Take 1 capsule by mouth daily 30 capsule 0    ziprasidone (GEODON) 40 MG capsule Take 1 capsule by mouth 2 times daily (with meals) 60 capsule 0    traZODone (DESYREL) 50 MG tablet Take 1 tablet by mouth nightly as needed for Sleep 30 tablet 0     Current Facility-Administered Medications on File Prior to Visit   Medication Dose Route Frequency Provider Last Rate Last Admin    levonorgestrel (MIRENA) IUD 52 mg 1 each  1 each IntraUTERine Once Drenda Logan   1 each at 01/07/19 1644       Review of Systems    Review of Systems:   History obtained from chart review and the patient  General ROS: negative for - chills, fatigue, fever, night sweats or weight gain  Constitutional: Negative for chills, diaphoresis, fatigue, fever and unexpected weight change. Musculoskeletal: Positive for arthralgias, gait problem and joint swelling. Neurological ROS: negative for - behavioral changes, confusion, headaches or seizures. Negative for weakness and numbness. Dermatological ROS: negative for - mole changes, rash  Cardiovascular: Negative for leg swelling. Gastrointestinal: Negative for constipation, diarrhea, nausea and vomiting.         Objective:  Dermatologic Exam:  Skin lesion/ulceration Absent . Skin No rashes or nodules noted. .   Skin is thin, with flaky sloughing skin as well as decreased hair growth to the lower leg  Small red hemosiderin deposits seen dorsal foot   Musculoskeletal:     1st MPJ ROM decreased, Bilateral.  Muscle strength 5/5, Bilateral.  Pain present upon palpation of toenails 1-5, Bilateral. decreased medial longitudinal arch, Bilateral.  Ankle ROM decreased,Bilateral.    Dorsally contracted digits present digits 2, Bilateral.     Vascular: DP pulses 1/4 bilateral.  PT pulses 0/4 bilateral.   CFT <5 seconds, Bilateral.  Hair growth absent to the level of the digits, Bilateral.  Edema present, Bilateral.  Varicosities absent, Bilateral. Erythema absent, Bilateral    Neurological: Sensation diminshed to light touch to level of digits, Bilateral.  Protective sensation intact 6/10 sites via 5.07/10g Penning[place]-Roseanne Monofilament, Bilateral.  negative Tinel's, Bilateral.  negative Valleix sign, Bilateral.      Integument: Warm, dry, supple, Bilateral.  Open lesion absent, Bilateral.  Interdigital maceration noted to web spaces 4, Bilateral.  Nails 1-5 left and 1-5 right thickened > 3.0 mm, dystrophic and crumbly, discolored with subungual debris. Fissures absent, Bilateral.   General: AAO x 3 in NAD.     Derm  Toenail Description  Sites of Onychomycosis Involvement (Check affected area)  [x] [x] [x] [x] [x] [x] [x] [x] [x] [x]  5 4 3 2 1 1 2 3 4 5                          Right                                        Left    Thickness  [x] [x] [x] [x] [x] [x] [x] [x] [x] [x]  5 4 3 2 1 1 2 3 4 5                         Right                                        Left    Dystrophic Changes   [x] [x] [x] [x] [x] [x] [x] [x] [x] [x]  5 4 3 2 1 1 2 3 4 5                         Right                                        Left    Color   [x] [x] [x] [x] [x] [x] [x] [x] [x] [x]  5 4 3 2 1 1 2 3 4 5                          Right Left    Incurvation/Ingrowin   [] [] [] [] [] [] [] [] [] []  5 4 3 2 1 1 2 3 4 5                         Right                                        Left    Inflammation/Pain   [x] [x] [x] [x] [x] [x] [x] [x] [x] [x]  5 4 3 2 1 1 2 3 4 5                         Right                                        Left        Visual inspection:  Deformity: hammertoe deformity marbella feet  amputation: absent  Skin lesions: absent  Edema: right- 2+ pitting edema, left- 2+ pitting edema    Sensory exam:  Monofilament sensation: abnormal - 6/10 via SW 5.07/10g monofilament to the plantar foot bilateral feet    Pulses: abnormal - 1/4 dorsalis pedis pulse and 0/4 Posterior tibial pulse,   Pinprick: Impaired  Proprioception: Impaired  Vibration (128 Hz): Impaired       DM with PVD       [x]Yes    []No      Assessment:  33 y.o. female with:   Diagnosis Orders   1. Type II diabetes mellitus with peripheral circulatory disorder (HCC)   DIABETES FOOT EXAM    Diabetic Shoe MISC      2. PVD (peripheral vascular disease) (HCC)   DIABETES FOOT EXAM      3. Tinea pedis of both feet   DIABETES FOOT EXAM      4. Pain in both lower extremities  HM DIABETES FOOT EXAM    Diabetic Shoe MISC              Q7   []Yes    []No                Q8   [x]Yes    []No                     Q9   []Yes    []No    Plan:   Pt was evaluated and examined. Patient was given personalized discharge instructions. To address tinea, patient to apply lotrisone cream to feet daily. Pt to monitor for fissures due to dryness. Advised pt to contact office is there are any open lesions. RX: DM shoes and inserts. I feel that these appliances are medically necessary for my patients well being and in my opinion are both reasonable and necessary to the accepted medical practice in the treatment of the above conditions.    Diabetic  shoes prevent the frictional forces on the feet and  from the feet being overloaded and decrease plantar presssure to the forefoot Abnormal foot/leg functions demands mechanical, functional protections and control. Without the diabetic shoes and inserts, the patient may develop an ulcer to the forefoot. Later on in life, the patient may develop an ulcer which leads to osteomyelitis and infections. These shoes accommodate the toe deformities    Description of the devices: These devices prevent ulcerations on diabetics  Due to the nature of my patients condition, I feel that this therapy is necessary indefinitely, as this is a form of continuous therapy. This is NOT a convenience item. It is my opinion that these devices will prevent DFU. Pt will follow up in 2 months or sooner if any problems arise. Diagnosis was discussed with the pt and all of their questions were answered in detail. Proper foot hygiene and care was discussed with the pt. Informed patient on proper diabetic foot care and importance of tight glycemic control. Patient to check feet daily and contact the office with any questions/problems/concerns.    Other comorbidity noted and will be managed by PCP.  9/23/2022    Electronically signed by Rayna Montoya DPM on 9/23/2022 at 9:12 AM  9/23/2022

## 2022-10-19 DIAGNOSIS — I10 ESSENTIAL (PRIMARY) HYPERTENSION: ICD-10-CM

## 2022-10-20 ENCOUNTER — TELEPHONE (OUTPATIENT)
Dept: INTERNAL MEDICINE | Age: 32
End: 2022-10-20

## 2022-10-20 NOTE — TELEPHONE ENCOUNTER
Refill request for amLODIPine (NORVASC) 5 MG tablet. If appropriate please send medication(s) to patients pharmacy. Next appt: 11/9/2022      Health Maintenance   Topic Date Due    Pneumococcal 0-64 years Vaccine (1 - PCV) Never done    Hepatitis B vaccine (1 of 3 - Risk 3-dose series) Never done    Diabetic retinal exam  11/24/2016    Diabetic microalbuminuria test  01/10/2020    Lipids  01/10/2020    Flu vaccine (1) 08/01/2022    A1C test (Diabetic or Prediabetic)  05/24/2023    Depression Monitoring  05/24/2023    Diabetic foot exam  09/23/2023    Cervical cancer screen  12/11/2023    DTaP/Tdap/Td vaccine (2 - Td or Tdap) 12/03/2026    COVID-19 Vaccine  Completed    Hepatitis C screen  Completed    HIV screen  Completed    Hepatitis A vaccine  Aged Out    Hib vaccine  Aged Out    Meningococcal (ACWY) vaccine  Aged Out    Varicella vaccine  Discontinued       Hemoglobin A1C (%)   Date Value   05/24/2022 6.8   08/11/2020 5.4   01/10/2019 6.3 (H)             ( goal A1C is < 7)   Microalb/Crt.  Ratio (mcg/mg creat)   Date Value   01/10/2019 CANNOT BE CALCULATED     LDL Cholesterol (mg/dL)   Date Value   01/10/2019 158 (H)   01/10/2019 158 (H)       (goal LDL is <100)   AST (U/L)   Date Value   05/15/2022 14     ALT (U/L)   Date Value   05/15/2022 18     BUN (mg/dL)   Date Value   05/15/2022 13     BP Readings from Last 3 Encounters:   08/26/22 (!) 149/99   07/24/22 (!) 144/94   06/08/22 (!) 162/100          (goal 120/80)          Patient Active Problem List:     Asthma     Anxiety and depression     Morbid obesity with BMI of 50.0-59.9, adult (HCC)     Seasonal allergies     Bipolar disorder, mixed ([address]     Cannabis abuse     Prediabetes     Irregular menses     PCOS (polycystic ovarian syndrome)     ASCUS w/ NEG HR HPV     Mirena IUD insertion 1/7/19     Chronic hypertension     Chest pain

## 2022-10-25 RX ORDER — AMLODIPINE BESYLATE 5 MG/1
TABLET ORAL
Qty: 30 TABLET | Refills: 0 | Status: SHIPPED | OUTPATIENT
Start: 2022-10-25 | End: 2022-11-09 | Stop reason: SDUPTHER

## 2022-11-02 ENCOUNTER — HOSPITAL ENCOUNTER (EMERGENCY)
Age: 32
Discharge: HOME | End: 2022-11-02
Attending: EMERGENCY MEDICINE
Payer: MEDICARE

## 2022-11-02 VITALS
WEIGHT: 293 LBS | BODY MASS INDEX: 45.99 KG/M2 | DIASTOLIC BLOOD PRESSURE: 69 MMHG | OXYGEN SATURATION: 99 % | RESPIRATION RATE: 17 BRPM | SYSTOLIC BLOOD PRESSURE: 116 MMHG | TEMPERATURE: 97.5 F | HEIGHT: 67 IN | HEART RATE: 92 BPM

## 2022-11-02 DIAGNOSIS — R25.2 SPASMS OF THE HANDS OR FEET: Primary | ICD-10-CM

## 2022-11-02 LAB
ALBUMIN SERPL-MCNC: 3.6 G/DL (ref 3.5–5.2)
ALBUMIN/GLOBULIN RATIO: 1.2 (ref 1–2.5)
ALP BLD-CCNC: 74 U/L (ref 35–104)
ALT SERPL-CCNC: 20 U/L (ref 5–33)
ANION GAP SERPL CALCULATED.3IONS-SCNC: 9 MMOL/L (ref 9–17)
AST SERPL-CCNC: 20 U/L
BILIRUB SERPL-MCNC: <0.1 MG/DL (ref 0.3–1.2)
BUN BLDV-MCNC: 13 MG/DL (ref 6–20)
CALCIUM SERPL-MCNC: 8.2 MG/DL (ref 8.6–10.4)
CHLORIDE BLD-SCNC: 106 MMOL/L (ref 98–107)
CO2: 24 MMOL/L (ref 20–31)
CREAT SERPL-MCNC: 1.11 MG/DL (ref 0.5–0.9)
GFR SERPL CREATININE-BSD FRML MDRD: >60 ML/MIN/1.73M2
GLUCOSE BLD-MCNC: 143 MG/DL (ref 70–99)
POTASSIUM SERPL-SCNC: 3.5 MMOL/L (ref 3.7–5.3)
SODIUM BLD-SCNC: 139 MMOL/L (ref 135–144)
TOTAL PROTEIN: 6.6 G/DL (ref 6.4–8.3)

## 2022-11-02 PROCEDURE — 99283 EMERGENCY DEPT VISIT LOW MDM: CPT

## 2022-11-02 PROCEDURE — 80053 COMPREHEN METABOLIC PANEL: CPT

## 2022-11-02 RX ORDER — CALCIUM CARBONATE 200(500)MG
1 TABLET,CHEWABLE ORAL DAILY
Qty: 30 TABLET | Refills: 0 | Status: SHIPPED | OUTPATIENT
Start: 2022-11-02 | End: 2022-12-02

## 2022-11-02 ASSESSMENT — ENCOUNTER SYMPTOMS
DIARRHEA: 0
SHORTNESS OF BREATH: 0
WHEEZING: 0
RHINORRHEA: 0
COUGH: 0
VOMITING: 0
ABDOMINAL DISTENTION: 0
SORE THROAT: 0
NAUSEA: 0
CONSTIPATION: 0

## 2022-11-02 ASSESSMENT — PAIN SCALES - GENERAL: PAINLEVEL_OUTOF10: 6

## 2022-11-02 ASSESSMENT — PAIN DESCRIPTION - LOCATION: LOCATION: HAND;ARM

## 2022-11-02 ASSESSMENT — PAIN DESCRIPTION - ORIENTATION: ORIENTATION: LEFT;RIGHT

## 2022-11-02 ASSESSMENT — PAIN - FUNCTIONAL ASSESSMENT: PAIN_FUNCTIONAL_ASSESSMENT: 0-10

## 2022-11-02 NOTE — ED NOTES
Labeled blood specimens sent to lab via tube system.     [] Lavender   [] on ice   [] Blue   [x] Green/yellow  [] Green/black [] on ice  [] Pink  [] Red  [] Yellow  [] Blood Cultures        Joshua Duarte RN  11/02/22 [id number]

## 2022-11-02 NOTE — Clinical Note
Fanta Nowak was seen and treated in our emergency department on 11/2/2022. She may return to work on 11/04/2022. If you have any questions or concerns, please don't hesitate to call.       Erika Blanca, DO

## 2022-11-02 NOTE — DISCHARGE INSTRUCTIONS
Please take Tums for your slightly decreased calcium level. Okay to continue work. Follow-up with primary care doctor for repeat labs.   Return to ER for worsening symptoms

## 2022-11-02 NOTE — ED TRIAGE NOTES
Pt arrived to ED 04 via triage. Pt co bilateral hand spasms since last night. Pt states that the pain radiates to her bilateral arms. Pt states that she started a new job and is doing a lot of movement with her hands. Pt denies any CP or SOB. Pt is resting on stretcher with call light within reach. Breathing is non labored and no acute distress is noted.    Will continue to follow plan of care

## 2022-11-02 NOTE — ED PROVIDER NOTES
[facility]  ED  Emergency Department        Pt Name: Benigno Cordova  MRN: [id number]  [place] 1990  Date of evaluation: 11/2/22    CHIEF COMPLAINT       Chief Complaint   Patient presents with    Spasms     Bilateral hand spasms radiating into arms. HISTORY OF PRESENT ILLNESS  (Location/Symptom, Timing/Onset, Context/Setting, Quality, Duration, ModifyingFactors, Severity.)      Benigno Cordova is a 33 y.o. female who presents with spasms in her hand, and her bilateral hands locking in place. And she feels spasms radiating into her forearms. 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 Patient denies any new medications. She is on Glucophage as well as some psych medications. States she is intermittently had this in the past but nothing like tonight. Which she started her new job for the first time    [facility] / SURGICAL / SOCIAL / FAMILY HISTORY      has a past medical history of Asthma, Bipolar disorder, mixed ([facility] [address] Cannabis abuse, Chronic hypertension, Depression, Diabetes mellitus ([facility] [address] Obesity, and Polycystic ovarian disease. has a past surgical history that includes Tonsillectomy and adenoidectomy and [place] tooth extraction.        Social History     Socioeconomic History    Marital status: Single     Spouse name: Not on file    Number of children: Not on file    Years of education: Not on file    Highest education level: Not on file   Occupational History    Not on file   Tobacco Use    Smoking status: Every Day     Packs/day: 0.50     Years: 10.00     Pack years: 5.00     Types: Cigarettes    Smokeless tobacco: Never   Substance and Sexual Activity    Alcohol use: Yes     Comment: socially    Drug use: Yes     Types: Cocaine    Sexual activity: Not Currently     Partners: Female, Male   Other Topics Concern    Not on file   Social History Narrative    Not on file Social Determinants of Health     Financial Resource Strain: Medium Risk    Difficulty of Paying Living Expenses: Somewhat hard   Food Insecurity: Food Insecurity Present    Worried About Running Out of Food in the Last Year: Sometimes true    Ran Out of Food in the Last Year: Sometimes true   Transportation Needs: Not on file   Physical Activity: Not on file   Stress: Not on file   Social Connections: Not on file   Intimate Partner Violence: Not on file   Housing Stability: Not on file       Family History   Problem Relation Age of Onset    High Blood Pressure Mother     Diabetes Maternal Grandfather     High Blood Pressure Maternal Grandfather     Schizophrenia Maternal Aunt     Bipolar Disorder Maternal Aunt     Schizophrenia Maternal Aunt     Colon Cancer Neg Hx     Breast Cancer Neg Hx     Uterine Cancer Neg Hx     Ovarian Cancer Neg Hx        Allergies:  Latex, Fruit & vegetable daily [nutritional supplements], and Seasonal    Home Medications:  Prior to Admission medications    Medication Sig Start Date End Date Taking? Authorizing Provider   calcium carbonate (ANTACID) 500 MG chewable tablet Take 1 tablet by mouth daily 11/2/22 12/2/22 Yes Cathleen Raya, DO   amLODIPine (NORVASC) 5 MG tablet take 1 tablet by mouth once daily 10/25/22   Leana Daugherty MD   clotrimazole-betamethasone (LOTRISONE) 1-0.05 % cream Apply topically 2 times daily. 9/23/22   Andrea Moya DPM   Diabetic Shoe MISC Diabetic shoes with custom inserts. Diagnosis 9/23/22   Andrea Moya DPM   metFORMIN (GLUCOPHAGE) 500 MG tablet Take 1 tablet by mouth 2 times daily (with meals) 6/8/22   Miguel A Adams MD   clotrimazole-betamethasone (LOTRISONE) 1-0.05 % cream Apply topically 2 times daily.  6/8/22   Miguel A Adams MD   albuterol sulfate HFA (VENTOLIN HFA) 108 (90 Base) MCG/ACT inhaler Inhale 2 puffs into the lungs every 6 hours as needed for Wheezing 6/8/22   Miguel A Adams MD   acetaminophen (TYLENOL) 500 MG tablet Take 1 tablet by mouth 3 times daily as needed for Pain 6/8/22   Miguel A Adams MD   fluticasone [facility]) 50 MCG/ACT nasal spray 1 spray by Each Nostril route 2 times daily as needed for Rhinitis or Allergies 6/8/22   Leana Daugherty MD   hydrOXYzine (VISTARIL) 50 MG capsule Take 50 mg by mouth 3 times daily as needed 1/20/21   Historical Provider, MD   hydrocortisone valerate (WESTCORT) 0.2 % ointment Apply topically daily. 11/13/20   Andrea Moya DPM   FLUoxetine (PROZAC) 40 MG capsule Take 1 capsule by mouth daily 1/8/17   Gracie Zarate MD   ziprasidone (GEODON) 40 MG capsule Take 1 capsule by mouth 2 times daily (with meals) 1/8/17   Garcie Zarate MD   traZODone (DESYREL) 50 MG tablet Take 1 tablet by mouth nightly as needed for Sleep 1/8/17   Gracie Zarate MD       REVIEW OF SYSTEMS    (2-9 systems for level 4, 10 or more for level 5)      Review of Systems   Constitutional:  Negative for activity change, appetite change, fatigue and fever. HENT:  Negative for congestion, rhinorrhea and sore throat. Respiratory:  Negative for cough, shortness of breath and wheezing. Cardiovascular:  Negative for chest pain, palpitations and leg swelling. Gastrointestinal:  Negative for abdominal distention, constipation, diarrhea, nausea and vomiting. Genitourinary:  Negative for decreased urine volume and dysuria. Musculoskeletal:  Positive for arthralgias. Skin:  Negative for rash. Neurological:  Negative for dizziness, weakness, light-headedness, numbness and headaches. PHYSICAL EXAM   (up to 7 for level 4, 8 or more for level 5)     INITIAL VITALS:   /69   Pulse 92   Temp 97.5 °F (36.4 °C) (Oral)   Resp 17   Ht 5' 7\" (1.702 m)   Wt 300 lb (136.1 kg)   SpO2 99%   BMI 46.99 kg/m²     Physical Exam  Vitals and nursing note reviewed. Constitutional:       Comments: Nontoxic appearing, answering all questions appropriately no signs of distress   HENT:      Head: Normocephalic and atraumatic. Eyes:      General:         Right eye: No discharge. Left eye: No discharge. Conjunctiva/sclera: Conjunctivae normal.   Cardiovascular:      Rate and Rhythm: Normal rate and regular rhythm. Heart sounds: Normal heart sounds. No murmur heard. No friction rub. No gallop. Pulmonary:      Effort: Pulmonary effort is normal. No respiratory distress. Breath sounds: Normal breath sounds. No stridor. No wheezing, rhonchi or rales. Chest:      Chest wall: No tenderness. Abdominal:      General: There is no distension. Palpations: Abdomen is soft. There is no mass. Tenderness: There is no abdominal tenderness. There is no guarding or rebound. Musculoskeletal:      Comments: No tenderness to palpation to her bilateral hands 2+ radial pulses hand grasp is 5 out of 5, no ecchymosis noted, full range of motion at wrist and digits. Skin:     General: Skin is warm and dry. Findings: No rash. Neurological:      Mental Status: She is alert and oriented to person, place, and time. DIFFERENTIAL  DIAGNOSIS     Patient with spasming to her hands, which could potentially be carpal spasming from low electrolytes. But likely related to overuse due to her new job.   She is tolerating oral intake we will have her increase fluids, check electrolytes, anticipate discharge home    PLAN (LABS / Alia Herrera / EKG):  Orders Placed This Encounter   Procedures    Comprehensive Metabolic Panel       MEDICATIONS ORDERED:  Orders Placed This Encounter   Medications    calcium carbonate (ANTACID) 500 MG chewable tablet     Sig: Take 1 tablet by mouth daily     Dispense:  30 tablet     Refill:  0       DIAGNOSTIC RESULTS / EMERGENCY DEPARTMENT COURSE / MDM     LABS:  Results for orders placed or performed during the hospital encounter of 11/02/22   Comprehensive Metabolic Panel   Result Value Ref Range    Glucose 143 (H) 70 - 99 mg/dL    BUN 13 6 - 20 mg/dL    Creatinine 1.11 (H) 0.50 - 0.90 mg/dL Est, Glom Filt Rate >60 >60 mL/min/1.73m2    Calcium 8.2 (L) 8.6 - 10.4 mg/dL    Sodium 139 135 - 144 mmol/L    Potassium 3.5 (L) 3.7 - 5.3 mmol/L    Chloride 106 98 - 107 mmol/L    CO2 24 20 - 31 mmol/L    Anion Gap 9 9 - 17 mmol/L    Alkaline Phosphatase 74 35 - 104 U/L    ALT 20 5 - 33 U/L    AST 20 <32 U/L    Total Bilirubin <0.1 (L) 0.3 - 1.2 mg/dL    Total Protein 6.6 6.4 - 8.3 g/dL    Albumin 3.6 3.5 - 5.2 g/dL    Albumin/Globulin Ratio 1.2 1.0 - 2.5       IMPRESSION: Carpal spasms      Calcium minimally decreased, will supplement with Tums, plan on outpatient follow-up with primary care provider.   Work note provided    FINAL IMPRESSION      1. Spasms of the hands or feet          DISPOSITION / PLAN     DISPOSITION Decision To Discharge 11/02/2022 09:24:14 AM      PATIENT REFERRED TO:  [place] [id number]  [phone]    Schedule an appointment as soon as possible for a visit in 2 days      DISCHARGE MEDICATIONS:  New Prescriptions    CALCIUM CARBONATE (ANTACID) 500 MG CHEWABLE TABLET    Take 1 tablet by mouth daily       Michoacano Rizzo DO  9:26 AM    Attending Emergency Physician  [address] [facility]    (Please note that portions of this note were completed with a voice recognition program.  Fernandez Genre made to edit the dictations but occasionally words are mis-transcribed.)              Erika Blanca DO  11/02/22 [id number]

## 2022-11-09 ENCOUNTER — OFFICE VISIT (OUTPATIENT)
Dept: INTERNAL MEDICINE | Age: 32
End: 2022-11-09
Payer: MEDICARE

## 2022-11-09 VITALS
DIASTOLIC BLOOD PRESSURE: 94 MMHG | BODY MASS INDEX: 45.99 KG/M2 | SYSTOLIC BLOOD PRESSURE: 159 MMHG | HEIGHT: 67 IN | OXYGEN SATURATION: 98 % | TEMPERATURE: 97.3 F | WEIGHT: 293 LBS | HEART RATE: 75 BPM

## 2022-11-09 DIAGNOSIS — J45.20 MILD INTERMITTENT ASTHMA WITHOUT COMPLICATION: ICD-10-CM

## 2022-11-09 DIAGNOSIS — F12.10 CANNABIS ABUSE: ICD-10-CM

## 2022-11-09 DIAGNOSIS — R53.83 OTHER FATIGUE: ICD-10-CM

## 2022-11-09 DIAGNOSIS — E87.6 HYPOKALEMIA: ICD-10-CM

## 2022-11-09 DIAGNOSIS — E11.9 TYPE 2 DIABETES MELLITUS WITHOUT COMPLICATION, WITHOUT LONG-TERM CURRENT USE OF INSULIN (HCC): ICD-10-CM

## 2022-11-09 DIAGNOSIS — Z23 NEEDS FLU SHOT: ICD-10-CM

## 2022-11-09 DIAGNOSIS — F31.60 BIPOLAR DISORDER, MIXED (HCC): ICD-10-CM

## 2022-11-09 DIAGNOSIS — I99.8 POORLY CONTROLLED BLOOD PRESSURE: ICD-10-CM

## 2022-11-09 DIAGNOSIS — M62.838 MUSCLE SPASM: Primary | ICD-10-CM

## 2022-11-09 DIAGNOSIS — G47.33 OSA (OBSTRUCTIVE SLEEP APNEA): ICD-10-CM

## 2022-11-09 DIAGNOSIS — R87.610 ATYPICAL SQUAMOUS CELL CHANGES OF UNDETERMINED SIGNIFICANCE (ASCUS) ON CERVICAL CYTOLOGY WITH NEGATIVE HIGH RISK HUMAN PAPILLOMA VIRUS (HPV) TEST RESULT: ICD-10-CM

## 2022-11-09 DIAGNOSIS — E66.01 MORBID OBESITY WITH BMI OF 50.0-59.9, ADULT (HCC): ICD-10-CM

## 2022-11-09 PROCEDURE — 99214 OFFICE O/P EST MOD 30 MIN: CPT

## 2022-11-09 PROCEDURE — G0009 ADMIN PNEUMOCOCCAL VACCINE: HCPCS

## 2022-11-09 PROCEDURE — 3074F SYST BP LT 130 MM HG: CPT

## 2022-11-09 PROCEDURE — 3078F DIAST BP <80 MM HG: CPT

## 2022-11-09 PROCEDURE — 4004F PT TOBACCO SCREEN RCVD TLK: CPT

## 2022-11-09 PROCEDURE — G8482 FLU IMMUNIZE ORDER/ADMIN: HCPCS

## 2022-11-09 PROCEDURE — 3044F HG A1C LEVEL LT 7.0%: CPT

## 2022-11-09 PROCEDURE — G8427 DOCREV CUR MEDS BY ELIG CLIN: HCPCS

## 2022-11-09 PROCEDURE — G0008 ADMIN INFLUENZA VIRUS VAC: HCPCS

## 2022-11-09 PROCEDURE — 2022F DILAT RTA XM EVC RTNOPTHY: CPT

## 2022-11-09 PROCEDURE — G8417 CALC BMI ABV UP PARAM F/U: HCPCS

## 2022-11-09 RX ORDER — AMLODIPINE BESYLATE 5 MG/1
TABLET ORAL
Qty: 30 TABLET | Refills: 0 | Status: SHIPPED | OUTPATIENT
Start: 2022-11-09

## 2022-11-09 RX ORDER — ALBUTEROL SULFATE 90 UG/1
2 AEROSOL, METERED RESPIRATORY (INHALATION) EVERY 6 HOURS PRN
Qty: 1 EACH | Refills: 1 | Status: SHIPPED | OUTPATIENT
Start: 2022-11-09

## 2022-11-09 RX ORDER — POTASSIUM CHLORIDE 20 MEQ/1
20 TABLET, EXTENDED RELEASE ORAL DAILY
Qty: 30 TABLET | Refills: 1 | Status: SHIPPED | OUTPATIENT
Start: 2022-11-09 | End: 2022-12-09

## 2022-11-09 ASSESSMENT — ENCOUNTER SYMPTOMS
SHORTNESS OF BREATH: 0
ABDOMINAL DISTENTION: 0
EYE ITCHING: 1
COLOR CHANGE: 0
GASTROINTESTINAL NEGATIVE: 1
FACIAL SWELLING: 0
SINUS PAIN: 0
CONSTIPATION: 0
DIARRHEA: 0
EYE PAIN: 0
PHOTOPHOBIA: 0
VOMITING: 0
CHOKING: 0
ALLERGIC/IMMUNOLOGIC NEGATIVE: 1
RHINORRHEA: 0
COUGH: 0
VOICE CHANGE: 0
NAUSEA: 0
CHEST TIGHTNESS: 0
RESPIRATORY NEGATIVE: 1
STRIDOR: 0
BACK PAIN: 0
APNEA: 0
EYE DISCHARGE: 0
EYE REDNESS: 1
ABDOMINAL PAIN: 0
WHEEZING: 0

## 2022-11-09 NOTE — PROGRESS NOTES
Attending Physician Statement  I have discussed the care of Tamiko Contreras, including pertinent history and exam findings with the resident. I have reviewed the key elements of all parts of the encounter with the resident. I agree with the assessment, and status of the problem list as documented. The plan and orders should include   Orders Placed This Encounter   Procedures    Influenza, AFLURIA, (age 1 y+), IM, Preservative Free, 0.5 mL    Pneumococcal, PCV20, PREVNAR 20, (age 25 yrs+), IM, PF    Lipid, Fasting    Microalbumin, Ur    GA IMMUNIZ,ADMIN,EACH ADDL    GA IMMUNIZ ADMIN,1 SINGLE/COMB VAC/TOXOID    and this was also documented by the resident. The medication list was reviewed with the resident and is up to date. The return visit should be in 4 week . Diagnosis Orders   1. Muscle spasm        2. Poorly controlled blood pressure  amLODIPine (NORVASC) 5 MG tablet      3. Hypokalemia        4. Bipolar disorder, mixed ([facility] [address]        5. Cannabis abuse        6. Morbid obesity with BMI of 50.0-59.9, adult ([facility] [address]        7. SHOBHA (obstructive sleep apnea)        8. Mild intermittent asthma without complication  albuterol sulfate HFA (VENTOLIN HFA) 108 (90 Base) MCG/ACT inhaler      9. Body mass index (BMI) 50.0-59.9, adult ([facility])  metFORMIN (GLUCOPHAGE) 500 MG tablet      10. ASCUS w/ NEG HR HPV        11.  Needs flu shot  Influenza, AFLURIA, (age 1 y+), IM, Preservative Free, 0.5 mL    Pneumococcal, PCV20, PREVNAR 20, (age 25 yrs+), IM, PF    GA IMMUNIZ,ADMIN,EACH ADDL    GA IMMUNIZ ADMIN,1 SINGLE/COMB VAC/TOXOID           Indra Mike MD   [place] Physician[place] [facility] [place]   Faculty, [place] Medicine Residency Program  [address] [facility]

## 2022-11-09 NOTE — PROGRESS NOTES
[facility]  [facility] [address] [address]  Dept: [phone]  Dept Fax: [phone]    Office Progress/Follow Up Note  Date ofpatient's visit: 11/9/2022  Patient's Name:  Elana José YOB: 1990            Patient Care Team:  Cecily Rojas MD as PCP - General (Internal Medicine)  Ness Greene DPM as Physician (Podiatry)  ================================================================    REASON FOR VISIT/CHIEF COMPLAINT:  Hypertension (Pt did not take medication today), Health Maintenance (Pended pneum, and flu, pt states he need to schedule a appt soon for diabetic eye exam), Equipment Request (Pt need new cpap supply), and Spasms (Pt state in hand and back, x 1 day, want medication)    HISTORY OF PRESENTING ILLNESS:  History was obtained from: patient, electronic medical record. Omar ramirez 33 y.o. is here for a 3 monthly follow-up visit for chronic medical disorders. She has been having spasms in her bilateral hands radiating up to her forearm, for last couple of weeks. She started a new job, which worsened her spasms, the last episode of spasm was followed by pain which lasted about 2 hours. She also has similar spasms in the thigh muscles and back muscles, intermittently, lasting for couple of minutes. She visited ER on 11/2 for similar complaint, was found to have minimal decrease in calcium, 8.2, was prescribed Tums. She also had low potassium 3.5. Even in the previous labs, potassium was low. Magnesium was obtained in May 2022, was 1.9. She denies any nausea, vomiting, diarrhea. Has good oral intake. No change in medications. Denies taking any OTC herbal supplements. Denies chest discomfort, breathing difficulty, lightheadedness or palpitations. Today, her blood pressure is elevated 153/100, and the repeat was 159/94 mmHg. She ran out of her medication's for past 1 month.   She is on Norvasc 5 Mg daily.    She also has history of type 2 diabetes mellitus,  On metformin 500 mg twice daily  Last hemoglobin 6.8 on May 2022. She has history of well-controlled asthma, on albuterol as needed. Last use of albuterol was about a month ago. She has history of hayfever allergy. No allergy symptoms currently. Uses hydroxyzine as needed. She follows at [facility]. She is on trazodone 50 Mg, Prozac 40 Mg and Geodon 40 Mg. Denies any depression, anxiety or elevated more episodes. She followed at podiatry and was prescribed diabetic shoes. She is going to schedule appointment with ophthalmologist soon. No other active concerns.     Patient Active Problem List   Diagnosis    Asthma    Anxiety and depression    Morbid obesity with BMI of 50.0-59.9, adult ([facility])    Seasonal allergies    Bipolar disorder, mixed ([address]    Cannabis abuse    Prediabetes    Irregular menses    PCOS (polycystic ovarian syndrome)    ASCUS w/ NEG HR HPV    Mirena IUD insertion 1/7/19    Chronic hypertension    Chest pain    SHOBHA (obstructive sleep apnea)       Health Maintenance Due   Topic Date Due    Hepatitis B vaccine (1 of 3 - Risk 3-dose series) Never done    Diabetic retinal exam  11/24/2016    Diabetic microalbuminuria test  01/10/2020    Lipids  01/10/2020    COVID-19 Vaccine (4 - Booster for Pfizer series) 09/20/2022       Allergies   Allergen Reactions    Latex      Rash     Fruit & Vegetable Daily [Nutritional Supplements]      Cataloupe,honeydew,grapes,watermelon,bananas,apples,pineapples    Seasonal          Current Outpatient Medications   Medication Sig Dispense Refill    albuterol sulfate HFA (VENTOLIN HFA) 108 (90 Base) MCG/ACT inhaler Inhale 2 puffs into the lungs every 6 hours as needed for Wheezing 1 each 1    Magnesium 400 MG CAPS Take 400 mg by mouth daily 30 capsule 2    amLODIPine (NORVASC) 5 MG tablet take 1 tablet by mouth once daily 30 tablet 0    metFORMIN (GLUCOPHAGE) 500 MG tablet Take 1 tablet by mouth 2 times daily (with meals) 60 tablet 0    potassium chloride (KLOR-CON M) 20 MEQ extended release tablet Take 1 tablet by mouth daily 30 tablet 1    calcium carbonate (ANTACID) 500 MG chewable tablet Take 1 tablet by mouth daily 30 tablet 0    clotrimazole-betamethasone (LOTRISONE) 1-0.05 % cream Apply topically 2 times daily. 45 g 2    clotrimazole-betamethasone (LOTRISONE) 1-0.05 % cream Apply topically 2 times daily. 45 g 2    acetaminophen (TYLENOL) 500 MG tablet Take 1 tablet by mouth 3 times daily as needed for Pain 50 tablet 0    fluticasone (FLONASE) 50 MCG/ACT nasal spray 1 spray by Each Nostril route 2 times daily as needed for Rhinitis or Allergies 16 g 0    hydrOXYzine (VISTARIL) 50 MG capsule Take 50 mg by mouth 3 times daily as needed      hydrocortisone valerate (WESTCORT) 0.2 % ointment Apply topically daily. 1 Tube 3    FLUoxetine (PROZAC) 40 MG capsule Take 1 capsule by mouth daily 30 capsule 0    ziprasidone (GEODON) 40 MG capsule Take 1 capsule by mouth 2 times daily (with meals) 60 capsule 0    traZODone (DESYREL) 50 MG tablet Take 1 tablet by mouth nightly as needed for Sleep 30 tablet 0    Diabetic Shoe MISC Diabetic shoes with custom inserts.   Diagnosis 2 each 0     Current Facility-Administered Medications   Medication Dose Route Frequency Provider Last Rate Last Admin    levonorgestrel (MIRENA) IUD 52 mg 1 each  1 each IntraUTERine Once Asiya Easton   1 each at 01/07/19 1644       Social History     Tobacco Use    Smoking status: Every Day     Packs/day: 0.50     Years: 10.00     Pack years: 5.00     Types: Cigarettes    Smokeless tobacco: Never   Substance Use Topics    Alcohol use: Yes     Comment: socially    Drug use: Yes     Types: Cocaine       Family History   Problem Relation Age of Onset    High Blood Pressure Mother     Diabetes Maternal Grandfather     High Blood Pressure Maternal Grandfather     Schizophrenia Maternal Aunt     Bipolar Disorder Maternal Aunt     Schizophrenia Maternal Aunt     Colon Cancer Neg Hx     Breast Cancer Neg Hx     Uterine Cancer Neg Hx     Ovarian Cancer Neg Hx         Past Medical History:   Diagnosis Date    Asthma     Has not needed inhaler since 2009    Bipolar disorder, mixed ([facility] [address]     Cannabis abuse 12/31/2016    Chronic hypertension 11/25/2019    Depression     Diabetes mellitus ([facility] [address] 11/20/2013    borderline    Obesity 08/01/2013    Polycystic ovarian disease         Past Surgical History:   Procedure Laterality Date    TONSILLECTOMY AND ADENOIDECTOMY      WISDOM TOOTH EXTRACTION          REVIEW OF SYSTEMS:  Review of Systems   Constitutional: Negative. Negative for activity change, appetite change, chills, diaphoresis, fatigue, fever and unexpected weight change. HENT: Negative. Negative for congestion, facial swelling, hearing loss, postnasal drip, rhinorrhea, sinus pain, tinnitus and voice change. Eyes:  Positive for redness and itching. Negative for photophobia, pain, discharge and visual disturbance. Respiratory: Negative. Negative for apnea, cough, choking, chest tightness, shortness of breath, wheezing and stridor. Cardiovascular: Negative. Negative for chest pain, palpitations and leg swelling. Gastrointestinal: Negative. Negative for abdominal distention, abdominal pain, constipation, diarrhea, nausea and vomiting. Endocrine: Negative. Negative for cold intolerance, heat intolerance and polyuria. Genitourinary: Negative. Negative for difficulty urinating, dysuria, frequency and urgency. Musculoskeletal: Negative. Negative for arthralgias, back pain, gait problem and myalgias. Skin: Negative. Negative for color change, pallor, rash and wound. Allergic/Immunologic: Negative. Neurological: Negative. Negative for dizziness, tremors, seizures, syncope, facial asymmetry, speech difficulty, weakness, light-headedness, numbness and headaches. Hematological: Negative. Negative for adenopathy.  Does not bruise/bleed easily. Psychiatric/Behavioral: Negative. Negative for agitation, behavioral problems, confusion, decreased concentration, dysphoric mood, hallucinations, self-injury, sleep disturbance and suicidal ideas. The patient is not nervous/anxious and is not hyperactive. PHYSICAL EXAM:  Vitals:    11/09/22 0940 11/09/22 0946   BP: (!) 153/100 (!) 159/94   Site: Right Upper Arm    Position: Sitting    Cuff Size: Large Adult    Pulse: 78 75   Temp: 97.3 °F (36.3 °C)    SpO2: 98%    Weight: (!) 334 lb 3.2 oz (151.6 kg)    Height: 5' 7\" (1.702 m)      BP Readings from Last 3 Encounters:   11/09/22 (!) 159/94   11/02/22 116/69   08/26/22 (!) 149/99        Physical Exam  Vitals and nursing note reviewed. Constitutional:       General: She is not in acute distress. Appearance: Normal appearance. She is obese. She is not ill-appearing, toxic-appearing or diaphoretic. HENT:      Head: Normocephalic and atraumatic. Right Ear: Ear canal and external ear normal. There is no impacted cerumen. Left Ear: Ear canal and external ear normal. There is no impacted cerumen. Nose: Nose normal. No congestion or rhinorrhea. Mouth/Throat:      Mouth: Mucous membranes are moist.      Pharynx: Oropharynx is clear. No oropharyngeal exudate or posterior oropharyngeal erythema. Eyes:      General: No scleral icterus. Right eye: No discharge. Left eye: No discharge. Extraocular Movements: Extraocular movements intact. Conjunctiva/sclera: Conjunctivae normal.      Pupils: Pupils are equal, round, and reactive to light. Cardiovascular:      Rate and Rhythm: Normal rate and regular rhythm. Pulses: Normal pulses. Heart sounds: Normal heart sounds. No murmur heard. No friction rub. No gallop. Pulmonary:      Effort: Pulmonary effort is normal. No respiratory distress. Breath sounds: Normal breath sounds. No stridor. No wheezing, rhonchi or rales.    Chest:      Chest wall: No tenderness. Abdominal:      General: Abdomen is flat. Bowel sounds are normal. There is no distension. Palpations: Abdomen is soft. There is no mass. Tenderness: There is no abdominal tenderness. There is no right CVA tenderness, left CVA tenderness, guarding or rebound. Hernia: No hernia is present. Musculoskeletal:         General: No swelling, tenderness or signs of injury. Normal range of motion. Cervical back: Normal range of motion and neck supple. No rigidity or tenderness. Right lower leg: No edema. Left lower leg: No edema. Skin:     General: Skin is warm and dry. Capillary Refill: Capillary refill takes less than 2 seconds. Coloration: Skin is not jaundiced or pale. Findings: No bruising, erythema, lesion or rash. Neurological:      Mental Status: She is alert and oriented to person, place, and time. Mental status is at baseline. Psychiatric:         Mood and Affect: Mood normal.         Behavior: Behavior normal.         Thought Content: Thought content normal.         Judgment: Judgment normal.         DIAGNOSTIC FINDINGS:  CBC:  Lab Results   Component Value Date/Time    WBC 10.0 05/14/2022 03:44 PM    HGB 12.8 05/14/2022 03:44 PM     05/14/2022 03:44 PM     06/01/2012 09:54 PM       BMP:    Lab Results   Component Value Date/Time     11/02/2022 08:23 AM    K 3.5 11/02/2022 08:23 AM     11/02/2022 08:23 AM    CO2 24 11/02/2022 08:23 AM    BUN 13 11/02/2022 08:23 AM    CREATININE 1.11 11/02/2022 08:23 AM    GLUCOSE 143 11/02/2022 08:23 AM    GLUCOSE 157 06/01/2012 09:54 PM       HEMOGLOBIN A1C:   Lab Results   Component Value Date/Time    LABA1C 6.8 05/24/2022 03:32 PM       FASTING LIPID PANEL:  Lab Results   Component Value Date    CHOL 231 (H) 01/10/2019    HDL 48 01/10/2019    HDL 48 01/10/2019    TRIG 124 01/10/2019        Diagnosis Orders   1.  Muscle spasm  Vitamin B12 & Folate    TSH With Reflex Ft4    Vitamin D 25 Hydroxy    Basic Metabolic Panel      2. Poorly controlled blood pressure  amLODIPine (NORVASC) 5 MG tablet      3. Hypokalemia  Basic Metabolic Panel    Magnesium    potassium chloride (KLOR-CON M) 20 MEQ extended release tablet      4. Bipolar disorder, mixed ([facility] [address]        5. Cannabis abuse        6. Morbid obesity with BMI of 50.0-59.9, adult ([facility])  TSH With Reflex Ft4      7. SHOBHA (obstructive sleep apnea)        8. Mild intermittent asthma without complication  albuterol sulfate HFA (VENTOLIN HFA) 108 (90 Base) MCG/ACT inhaler      9. Body mass index (BMI) 50.0-59.9, adult ([facility])  metFORMIN (GLUCOPHAGE) 500 MG tablet      10. ASCUS w/ NEG HR HPV        11. Needs flu shot  Influenza, AFLURIA, (age 1 y+), IM, Preservative Free, 0.5 mL    Pneumococcal, PCV20, PREVNAR 20, (age 25 yrs+), IM, PF    CO IMMUNIZ,ADMIN,EACH ADDL    CO IMMUNIZ ADMIN,1 SINGLE/COMB VAC/TOXOID      12. Type 2 diabetes mellitus without complication, without long-term current use of insulin ([facility])  Hemoglobin A1C      13. Other fatigue  TSH With Reflex Ft4    Vitamin D 25 Hydroxy           ASSESSMENT AND PLAN:  Jeevan Akers was seen today for hypertension, health maintenance, equipment request and spasms. Diagnoses and all orders for this visit:    Muscle spasm  Likely secondary to hypokalemia. She is started on 20 mEq of potassium daily. BMP and magnesium levels ordered for today, will follow up. -     Vitamin B12 & Folate; Future  -     TSH With Reflex Ft4; Future  -     Vitamin D 25 Hydroxy; Future  -     Basic Metabolic Panel; Future    Poorly controlled blood pressure        -     Secondary to noncompliance, as the patient ran out of medication for past 1 month at least.  -     amLODIPine (NORVASC) 5 MG tablet; take 1 tablet by mouth once daily  -     Patient is instructed to monitor blood pressure at home. If the blood pressure remains high in spite of being on Norvasc, she is instructed to call the office.     Hypokalemia  -     Basic Metabolic Panel; Future  -     Magnesium; Future  -     potassium chloride (KLOR-CON M) 20 MEQ extended release tablet; Take 1 tablet by mouth daily    Bipolar disorder, mixed ([address]      -Follows at . [facility] obesity with BMI of 50.0-59.9, adult ([facility])  -     TSH With Reflex Ft4; Future    SHOBHA (obstructive sleep apnea)      -     She compliant with the CPAP. Mild intermittent asthma without complication  -     albuterol sulfate HFA (VENTOLIN HFA) 108 (90 Base) MCG/ACT inhaler; Inhale 2 puffs into the lungs every 6 hours as needed for Wheezing    ASCUS w/ NEG HR HPV       -In 2018        -Plan for Pap smear in the next visit. Needs flu shot  -     Influenza, AFLURIA, (age 1 y+), IM, Preservative Free, 0.5 mL  -     Pneumococcal, PCV20, PREVNAR 20, (age 25 yrs+), IM, PF  -     MD IMMUNIZ,ADMIN,EACH ADDL  -     MD IMMUNIZ ADMIN,1 SINGLE/COMB VAC/TOXOID    Type 2 diabetes mellitus without complication, without long-term current use of insulin ([facility])  -     Hemoglobin A1C; Future  -     metFORMIN (GLUCOPHAGE) 500 MG tablet; Take 1 tablet by mouth 2 times daily (with meals)    Other fatigue  -     TSH With Reflex Ft4; Future  -     Vitamin D 25 Hydroxy; Future    Other orders  -     Lipid, Fasting; Future  -     Microalbumin, Ur; Future  -     Magnesium 400 MG CAPS; Take 400 mg by mouth daily    FOLLOW UP AND INSTRUCTIONS:  Return in about 4 weeks (around 12/7/2022). Ellie Andre received counseling on the following healthy behaviors: nutrition, exercise, medication adherence, tobacco cessation, and decrease in alcohol consumption    Discussed use, benefit, and side effects of prescribed medications. Barriers to medication compliance addressed. All patient questions answered. Pt voiced understanding.     Patient given educational materials - see patient instructions  Anam Cole MD   Internal Medicine  11/9/2022, 11:56 AM    This note is created with the assistance of a speech-recognition program. While intending to generate a document that actually reflects the content of thevisit, the document can still have some mistakes which may not have been identified and corrected by editing.

## 2022-11-18 ENCOUNTER — OFFICE VISIT (OUTPATIENT)
Dept: INTERNAL MEDICINE | Age: 32
End: 2022-11-18
Payer: MEDICARE

## 2022-11-18 ENCOUNTER — TELEPHONE (OUTPATIENT)
Dept: INTERNAL MEDICINE | Age: 32
End: 2022-11-18

## 2022-11-18 VITALS
WEIGHT: 293 LBS | TEMPERATURE: 98.6 F | HEART RATE: 104 BPM | SYSTOLIC BLOOD PRESSURE: 134 MMHG | DIASTOLIC BLOOD PRESSURE: 104 MMHG | HEIGHT: 67 IN | OXYGEN SATURATION: 99 % | BODY MASS INDEX: 45.99 KG/M2

## 2022-11-18 DIAGNOSIS — L73.9 FOLLICULITIS OF PERINEUM: ICD-10-CM

## 2022-11-18 DIAGNOSIS — L73.2 HIDRADENITIS: Primary | ICD-10-CM

## 2022-11-18 PROCEDURE — 99213 OFFICE O/P EST LOW 20 MIN: CPT | Performed by: STUDENT IN AN ORGANIZED HEALTH CARE EDUCATION/TRAINING PROGRAM

## 2022-11-18 PROCEDURE — 4004F PT TOBACCO SCREEN RCVD TLK: CPT | Performed by: STUDENT IN AN ORGANIZED HEALTH CARE EDUCATION/TRAINING PROGRAM

## 2022-11-18 PROCEDURE — 99211 OFF/OP EST MAY X REQ PHY/QHP: CPT | Performed by: STUDENT IN AN ORGANIZED HEALTH CARE EDUCATION/TRAINING PROGRAM

## 2022-11-18 PROCEDURE — 3078F DIAST BP <80 MM HG: CPT | Performed by: STUDENT IN AN ORGANIZED HEALTH CARE EDUCATION/TRAINING PROGRAM

## 2022-11-18 PROCEDURE — G8482 FLU IMMUNIZE ORDER/ADMIN: HCPCS | Performed by: STUDENT IN AN ORGANIZED HEALTH CARE EDUCATION/TRAINING PROGRAM

## 2022-11-18 PROCEDURE — 3074F SYST BP LT 130 MM HG: CPT | Performed by: STUDENT IN AN ORGANIZED HEALTH CARE EDUCATION/TRAINING PROGRAM

## 2022-11-18 PROCEDURE — G8417 CALC BMI ABV UP PARAM F/U: HCPCS | Performed by: STUDENT IN AN ORGANIZED HEALTH CARE EDUCATION/TRAINING PROGRAM

## 2022-11-18 PROCEDURE — G8427 DOCREV CUR MEDS BY ELIG CLIN: HCPCS | Performed by: STUDENT IN AN ORGANIZED HEALTH CARE EDUCATION/TRAINING PROGRAM

## 2022-11-18 RX ORDER — CLINDAMYCIN PHOSPHATE 10 UG/ML
LOTION TOPICAL
Qty: 60 G | Refills: 2 | Status: SHIPPED | OUTPATIENT
Start: 2022-11-18 | End: 2022-12-18

## 2022-11-18 RX ORDER — TRAMADOL HYDROCHLORIDE 50 MG/1
50 TABLET ORAL EVERY 4 HOURS PRN
Qty: 18 TABLET | Refills: 0 | Status: SHIPPED | OUTPATIENT
Start: 2022-11-18 | End: 2022-11-21

## 2022-11-18 NOTE — LETTER
[address] [facility] [address] [address]  Phone: [phone]  Fax: [phone]    Re Kate DO        November 18, 2022     [address]  [address] [address]      Dear Davina Mora: We missed seeing you for a scheduled appointment at [address] [place] [address] with Re Kate DO on 11/18/2022. We're sorry you were unable to keep your appointment and hope that you are doing well. We ask that you please call 24 hours in advance if you are unable to make your appointment, so that we can give that time to another patient in need. We care about you and the management of your healthcare and want to make sure that you follow up as recommended. To provide quality care and timely appointments to all our patients, you may be dismissed from the practice if you do not show for three (3) scheduled appointments within a 12-month period. We would like to continue treating your healthcare needs. Please call the office to reschedule your appointment, if needed.      Sincerely,        Re Kate DO

## 2022-11-18 NOTE — PROGRESS NOTES
[facility]  [facility] [address] [address]  Dept: [phone]  Dept Fax: [phone]    Office Progress/Follow Up Note  Date of patient's visit: 2022  Patient's Name:  Micheline Agarwal YOB: 1990            Patient Care Team:  Ronnell Swartz MD as PCP - General (Internal Medicine)  Ivan Oliveros DPM as Physician (Podiatry)  ________________________________________________________________________      Reason for Visit: Vaginal lesion  ________________________________________________________________________  Chief Complaint:  Hypertension (Pt did not take medication today) and Vaginal Pain (X 5 years, lump on in side of pt vagina lip)    ________________________________________________________________________  History of Presenting Illness:  Micheline Agarwal is a 33 y.o. presenting to the office for same-day visit for complaints of \"hairy lesions in her vagina\". Patient describes lesions in her vaginal area which have been present for 5 years and every 4 to 5 months swell and burst discharging a white cheeselike discharge. Patient states the lesions are not always painful however when they swell and burst are very painful. She does not typically get these lesions anywhere else however she does think she got them in her armpit once in the past.  She thought maybe they were related to shaving so she discontinued shaving however is still having these lesions. She has been worked up extensively for STDs in the past and has not had any positive results. She states she has had 3 partners in the last year but has not had sexual intercourse in the last few months. She has not had any pregnancies in the past.  She denies any chest pain, shortness of breath, abdominal pain, dysuria, increased urinary frequency, fevers or chills.     10 point review of systems reviewed and negative except for stated above  Active Ambulatory Problems     Diagnosis Date Noted    Asthma 08/29/2012    Anxiety and depression 08/29/2012    Morbid obesity with BMI of 50.0-59.9, adult ([facility] 08/01/2013    Seasonal allergies 10/03/2013    Bipolar disorder, mixed ([facility] 12/07/2016    Cannabis abuse 12/31/2016    Prediabetes 11/30/2018    Irregular menses 12/11/2018    PCOS (polycystic ovarian syndrome) 12/17/2018    ASCUS w/ NEG HR HPV 01/15/2019    Mirena IUD insertion 1/7/19 02/15/2019    Chronic hypertension 11/25/2019    Chest pain 05/14/2022    SHOBHA (obstructive sleep apnea) 11/09/2022     Resolved Ambulatory Problems     Diagnosis Date Noted    Abnormal uterine bleeding - likely anovulatory 09/17/2013    Viral URI 10/03/2013     Past Medical History:   Diagnosis Date    Depression     Diabetes mellitus ([facility] 11/20/2013    Obesity 08/01/2013    Polycystic ovarian disease         Health Maintenance Due   Topic Date Due    Hepatitis B vaccine (1 of 3 - Risk 3-dose series) Never done    Diabetic retinal exam  11/24/2016    Diabetic microalbuminuria test  01/10/2020    Lipids  01/10/2020    COVID-19 Vaccine (4 - Booster for Pfizer series) 09/20/2022       Allergies   Allergen Reactions    Latex      Rash     Fruit & Vegetable Daily [Nutritional Supplements]      Cataloupe,honeydew,grapes,watermelon,bananas,apples,pineapples    Seasonal          Current Outpatient Medications   Medication Sig Dispense Refill    clindamycin (CLEOCIN-T) 1 % lotion Apply topically 2 times daily. 60 g 2    traMADol (ULTRAM) 50 MG tablet Take 1 tablet by mouth every 4 hours as needed for Pain for up to 3 days. Intended supply: 5 days.  Take lowest dose possible to manage pain 18 tablet 0    albuterol sulfate HFA (VENTOLIN HFA) 108 (90 Base) MCG/ACT inhaler Inhale 2 puffs into the lungs every 6 hours as needed for Wheezing 1 each 1    Magnesium 400 MG CAPS Take 400 mg by mouth daily 30 capsule 2    amLODIPine (NORVASC) 5 MG tablet take 1 tablet by mouth once daily 30 tablet 0    metFORMIN (GLUCOPHAGE) 500 MG tablet Aunt     Schizophrenia Maternal Aunt     Colon Cancer Neg Hx     Breast Cancer Neg Hx     Uterine Cancer Neg Hx     Ovarian Cancer Neg Hx       ________________________________________________________________________    ________________________________________________________________________  Physical Exam:  Vitals:    11/18/22 1102 11/18/22 1107   BP: (!) 137/101 (!) 134/104   Site: Right Upper Arm    Position: Sitting    Cuff Size: Large Adult    Pulse: (!) 104 (!) 104   Temp: 98.6 °F (37 °C)    SpO2: 99%    Weight: (!) 335 lb 6.4 oz (152.1 kg)    Height: 5' 7\" (1.702 m)      BP Readings from Last 3 Encounters:   11/18/22 (!) 134/104   11/09/22 (!) 159/94   11/02/22 116/69      Physical Exam:  General:  Pleasant and cooperative. No apparent distress. HEENT:  Normocephalic, atraumatic, mucus membranes moist.   Neck:  Trachea midline.   : 1 cm lesion noted in perennial area about 5 cm inferior to vaginal introitus with white curd-like discharge and surrounding erythema and edema of the area  Neuro: No focal deficits, ANO x3  Skin/axilla: No rash, warm dry, no other lesions noted    ________________________________________________________________________  Diagnostic findings:  CBC:  Lab Results   Component Value Date/Time    WBC 10.0 05/14/2022 03:44 PM    HGB 12.8 05/14/2022 03:44 PM     05/14/2022 03:44 PM     06/01/2012 09:54 PM       BMP:    Lab Results   Component Value Date/Time     11/02/2022 08:23 AM    K 3.5 11/02/2022 08:23 AM     11/02/2022 08:23 AM    CO2 24 11/02/2022 08:23 AM    BUN 13 11/02/2022 08:23 AM    CREATININE 1.11 11/02/2022 08:23 AM    GLUCOSE 143 11/02/2022 08:23 AM    GLUCOSE 157 06/01/2012 09:54 PM       HEMOGLOBIN A1C:   Lab Results   Component Value Date/Time    LABA1C 6.8 05/24/2022 03:32 PM       FASTING LIPID PANEL:  Lab Results   Component Value Date    CHOL 231 (H) 01/10/2019    HDL 48 01/10/2019    HDL 48 01/10/2019    TRIG 124 01/10/2019 ________________________________________________________________________  Assessment and Plan:  #Perineum soft tissue lesion, suspect hidradenitis suppurativa versus folliculitis  -Will refer to [facility] gynecology, will give clindamycin cream and tramadol for pain. Patient was instructed to try to keep the area clean and dry  -Patient also due for Pap smear which she can complete with her OB/GYN visit  -Follow-up with PCP at regular scheduled visit  ________________________________________________________________________  Follow up and instructions:  No follow-ups on file. Jett Benjamin received counseling on the following healthy behaviors: medication adherence    Discussed use, benefit, and side effects of prescribed medications. Barriers to medication compliance addressed. All patient questions answered. Pt voiced understanding. Patient given educational materials - see patient instructions    Karthik Wynn DO      Department of Internal Medicine  [facility]         11/18/2022, 12:01 PM      This note is created with the assistance of a speech-recognition program. While intending to generate a document that actually reflects the content of the visit, the document can still have some mistakes which may not have been identified and corrected by editing.

## 2022-12-14 ENCOUNTER — HOSPITAL ENCOUNTER (OUTPATIENT)
Age: 32
Discharge: HOME | End: 2022-12-14
Payer: MEDICARE

## 2022-12-14 ENCOUNTER — HOSPITAL ENCOUNTER (EMERGENCY)
Age: 32
Discharge: HOME | End: 2022-12-14
Attending: EMERGENCY MEDICINE
Payer: MEDICARE

## 2022-12-14 VITALS
TEMPERATURE: 97.1 F | RESPIRATION RATE: 16 BRPM | WEIGHT: 293 LBS | DIASTOLIC BLOOD PRESSURE: 95 MMHG | HEART RATE: 78 BPM | HEIGHT: 69 IN | BODY MASS INDEX: 43.4 KG/M2 | SYSTOLIC BLOOD PRESSURE: 145 MMHG | OXYGEN SATURATION: 100 %

## 2022-12-14 DIAGNOSIS — E11.9 TYPE 2 DIABETES MELLITUS WITHOUT COMPLICATION, WITHOUT LONG-TERM CURRENT USE OF INSULIN (HCC): ICD-10-CM

## 2022-12-14 DIAGNOSIS — R20.2 NUMBNESS AND TINGLING IN BOTH HANDS: Primary | ICD-10-CM

## 2022-12-14 DIAGNOSIS — M62.838 MUSCLE SPASM: ICD-10-CM

## 2022-12-14 DIAGNOSIS — E87.6 HYPOKALEMIA: ICD-10-CM

## 2022-12-14 DIAGNOSIS — R53.83 OTHER FATIGUE: ICD-10-CM

## 2022-12-14 DIAGNOSIS — E66.01 MORBID OBESITY WITH BMI OF 50.0-59.9, ADULT (HCC): ICD-10-CM

## 2022-12-14 DIAGNOSIS — R20.0 NUMBNESS AND TINGLING IN BOTH HANDS: Primary | ICD-10-CM

## 2022-12-14 LAB
ANION GAP SERPL CALCULATED.3IONS-SCNC: 9 MMOL/L (ref 9–17)
BUN BLDV-MCNC: 15 MG/DL (ref 6–20)
CALCIUM SERPL-MCNC: 8.8 MG/DL (ref 8.6–10.4)
CHLORIDE BLD-SCNC: 105 MMOL/L (ref 98–107)
CHOLESTEROL, FASTING: 208 MG/DL
CHOLESTEROL/HDL RATIO: 4.7
CO2: 23 MMOL/L (ref 20–31)
CREAT SERPL-MCNC: 0.97 MG/DL (ref 0.5–0.9)
ESTIMATED AVERAGE GLUCOSE: 154 MG/DL
FOLATE: 7.3 NG/ML
GFR SERPL CREATININE-BSD FRML MDRD: >60 ML/MIN/1.73M2
GLUCOSE BLD-MCNC: 121 MG/DL (ref 65–105)
GLUCOSE BLD-MCNC: 123 MG/DL (ref 70–99)
HBA1C MFR BLD: 7 % (ref 4–6)
HDLC SERPL-MCNC: 44 MG/DL
LDL CHOLESTEROL: 149 MG/DL (ref 0–130)
MAGNESIUM: 2 MG/DL (ref 1.6–2.6)
POTASSIUM SERPL-SCNC: 4.2 MMOL/L (ref 3.7–5.3)
SODIUM BLD-SCNC: 137 MMOL/L (ref 135–144)
TRIGLYCERIDE, FASTING: 75 MG/DL
TSH SERPL DL<=0.05 MIU/L-ACNC: 1.13 UIU/ML (ref 0.3–5)
VITAMIN B-12: 473 PG/ML (ref 232–1245)
VITAMIN D 25-HYDROXY: 8.5 NG/ML

## 2022-12-14 PROCEDURE — 84443 ASSAY THYROID STIM HORMONE: CPT

## 2022-12-14 PROCEDURE — 82947 ASSAY GLUCOSE BLOOD QUANT: CPT

## 2022-12-14 PROCEDURE — 80048 BASIC METABOLIC PNL TOTAL CA: CPT

## 2022-12-14 PROCEDURE — 82306 VITAMIN D 25 HYDROXY: CPT

## 2022-12-14 PROCEDURE — 99282 EMERGENCY DEPT VISIT SF MDM: CPT

## 2022-12-14 PROCEDURE — 82607 VITAMIN B-12: CPT

## 2022-12-14 PROCEDURE — 83036 HEMOGLOBIN GLYCOSYLATED A1C: CPT

## 2022-12-14 PROCEDURE — 80061 LIPID PANEL: CPT

## 2022-12-14 PROCEDURE — 36415 COLL VENOUS BLD VENIPUNCTURE: CPT

## 2022-12-14 PROCEDURE — 82746 ASSAY OF FOLIC ACID SERUM: CPT

## 2022-12-14 PROCEDURE — 83735 ASSAY OF MAGNESIUM: CPT

## 2022-12-14 ASSESSMENT — PAIN - FUNCTIONAL ASSESSMENT: PAIN_FUNCTIONAL_ASSESSMENT: NONE - DENIES PAIN

## 2022-12-14 NOTE — DISCHARGE INSTRUCTIONS
You were seen in the ER due to numbness and tingling of your arms, which seems to be consistent with carpal tunnel syndrome. We recommend you follow up with the hand surgeons for appropriate workup and management of this condition. If you are unable to follow up with them in a reasonnable amount of time, follow up with your PCP. Wear these braces when sleeping. Wear one at a time when working. Fátima Jaramillo!!!    From [facility] Emergency Department    On behalf of the Emergency Department staff at [facility]. [facility] Emergency Department, I would like to thank you for giving [facility] the opportunity to address your health care needs and concerns. We hope that during your visit, our service was delivered in a professional and caring manner. Please keep [facility] in mind as we walk with you down the path to your own personal wellness. Please expect an automated phone call from [phone] so we can ask a few questions about your health and progress. Based on your answers, a clinician may call you back to offer help and instructions. If you notice any concerning symptoms please return to the ER immediately. These can include but are not limited to: fevers, chills, shortness of breath, vomiting, weakness of the extremities, changes in your mental status, numbness, pale extremities, or chest pain.

## 2022-12-14 NOTE — ED PROVIDER NOTES
[address]  ED  Emergency Department Encounter  Emergency Medicine Resident     Pt Name: Allyson Gupta  MRN: [id number]  [place] 1990  Date of evaluation: 12/14/22  PCP:  Kenyatta Valdez MD    [address] [place] [address]       Chief Complaint   Patient presents with    Numbness     Bilateral arms/hands x at least one week       HISTORY OFPRESENT ILLNESS  (Location/Symptom, Timing/Onset, Context/Setting, Quality, Duration, Modifying Factors,Severity.)      Allyosn Gupta is a 33 y.o. female who presents with complaint of paresthesias and numbness to her bilateral hands for the past week. She says that the pain/paresthesias radiate from her wrist down to the index finger, middle digits and palm of her hand. More on the right than the left but it does happen to both hands. She denies any trauma or injury. 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 Right-hand-dominant. Denies fevers, chills, nausea or vomiting. No headache, blurred vision, dysarthria, facial numbness/weakness. PAST MEDICAL / SURGICAL / SOCIAL / FAMILY HISTORY      has a past medical history of Asthma, Bipolar disorder, mixed ([address] Cannabis abuse, Chronic hypertension, Depression, Diabetes mellitus ([address] Obesity, and Polycystic ovarian disease. has a past surgical history that includes Tonsillectomy and adenoidectomy and Fill[place] tooth extraction. Social:  reports that she has been smoking cigarettes. She has a 5.00 pack-year smoking history. She has never used smokeless tobacco. She reports current alcohol use. She reports current drug use. Drug: Cocaine.     Family Hx:   Family History   Problem Relation Age of Onset    High Blood Pressure Mother     Diabetes Maternal Grandfather     High Blood Pressure Maternal Grandfather     Schizophrenia Maternal Aunt     Bipolar Disorder Maternal Aunt     Schizophrenia Maternal Aunt     Colon Cancer Neg Hx     Breast Cancer Neg Hx     Uterine Cancer Neg Hx Ovarian Cancer Neg Hx       Allergies:  Latex, Fruit & vegetable daily [nutritional supplements], and Seasonal    Home Medications:  Prior to Admission medications    Medication Sig Start Date End Date Taking? Authorizing Provider   clindamycin (CLEOCIN-T) 1 % lotion Apply topically 2 times daily. 22  Israel Cheadle, MD   albuterol sulfate HFA (VENTOLIN HFA) 108 (90 Base) MCG/ACT inhaler Inhale 2 puffs into the lungs every 6 hours as needed for Wheezing 22   Suad Mckenzie MD   Magnesium 400 MG CAPS Take 400 mg by mouth daily 22   Suad Mckenzie MD   amLODIPine (NORVASC) 5 MG tablet take 1 tablet by mouth once daily 22   Suad Mckenzie MD   metFORMIN (GLUCOPHAGE) 500 MG tablet Take 1 tablet by mouth 2 times daily (with meals) 22   Suad Mckenzie MD   potassium chloride (KLOR-CON M) 20 MEQ extended release tablet Take 1 tablet by mouth daily 22  Ronnell Swartz MD   clotrimazole-betamethasone (LOTRISONE) 1-0.05 % cream Apply topically 2 times daily. 22   Ivan Oliveros DPM   Diabetic Shoe MISC Diabetic shoes with custom inserts. Diagnosis 22   Ivan Oliveros DPM   clotrimazole-betamethasone (LOTRISONE) 1-0.05 % cream Apply topically 2 times daily. 22   Junie Trujillo MD   acetaminophen (TYLENOL) 500 MG tablet Take 1 tablet by mouth 3 times daily as needed for Pain 22   Junie Trujillo MD   fluticasone Lovely Sails) 50 MCG/ACT nasal spray 1 spray by Each Nostril route 2 times daily as needed for Rhinitis or Allergies 22   Ronnell Swartz MD   hydrOXYzine (VISTARIL) 50 MG capsule Take 50 mg by mouth 3 times daily as needed 21   Historical Provider, MD   hydrocortisone valerate (WESTCORT) 0.2 % ointment Apply topically daily.  20   Ivan Oliveros DPM   FLUoxetine (PROZAC) 40 MG capsule Take 1 capsule by mouth daily 17   Felice Jackson MD   ziprasidone (GEODON) 40 MG capsule Take 1 capsule by mouth 2 times daily (with meals) 17   Amanda COLEMAN Eliana Jenkins MD   traZODone (DESYREL) 50 MG tablet Take 1 tablet by mouth nightly as needed for Sleep 1/8/17   Salomón Solano MD       REVIEW OFSYSTEMS    (2-9 systems for level 4, 10 or more for level 5)      Review of Systems   Constitutional:  Negative for appetite change, chills, fatigue and fever. HENT:  Negative for congestion, rhinorrhea, sneezing and sore throat. Eyes:  Negative for visual disturbance. Respiratory:  Negative for cough and shortness of breath. Cardiovascular:  Negative for chest pain and leg swelling. Gastrointestinal:  Negative for abdominal pain, diarrhea, nausea and vomiting. Genitourinary:  Negative for dysuria. Musculoskeletal:  Negative for myalgias, neck pain and neck stiffness. Skin:  Negative for rash and wound. Neurological:  Positive for numbness (bilateral hands). Negative for dizziness, syncope, light-headedness and headaches. Psychiatric/Behavioral:  Negative for dysphoric mood and suicidal ideas. PHYSICAL EXAM   (up to 7 for level 4, 8 or more forlevel 5)      INITIAL VITALS:   Vitals:    12/14/22 1635   BP: (!) 145/95   Pulse: 78   Resp: 16   Temp: 97.1 °F (36.2 °C)   SpO2: 100%        Physical Exam  Vitals and nursing note reviewed. Constitutional:       General: She is not in acute distress. Appearance: Normal appearance. She is not ill-appearing or diaphoretic. HENT:      Head: Normocephalic. Nose: Nose normal.      Mouth/Throat:      Mouth: Mucous membranes are moist.      Pharynx: Oropharynx is clear. Eyes:      Extraocular Movements: Extraocular movements intact. Conjunctiva/sclera: Conjunctivae normal.      Pupils: Pupils are equal, round, and reactive to light. Cardiovascular:      Rate and Rhythm: Normal rate and regular rhythm. Pulses: Normal pulses. Heart sounds: Normal heart sounds. Pulmonary:      Effort: Pulmonary effort is normal. No respiratory distress. Breath sounds: Normal breath sounds.  No wheezing or rales. Chest:      Chest wall: No tenderness. Abdominal:      General: There is no distension. Palpations: Abdomen is soft. Tenderness: There is no abdominal tenderness. There is no guarding or rebound. Musculoskeletal:      Cervical back: Normal range of motion and neck supple. Comments: Positive Phalen's, positive Tinel's   Skin:     General: Skin is warm. Capillary Refill: Capillary refill takes less than 2 seconds. Neurological:      General: No focal deficit present. Mental Status: She is alert and oriented to person, place, and time. Psychiatric:         Mood and Affect: Mood normal.         Behavior: Behavior normal.       MEDICAL DECISION MAKING     Initial MDM/Plan: 33 y.o. female who presents with numbness and paresthesias to her bilateral hands from her wrist down to her index finger and middle finger in the palm of her hands for the past week. No injury or trauma. Vital signs reviewed and are within normal limits. Physical examination showing positive Phalen's and positive Tinel's. Impression is carpal tunnel syndrome. Discussed the diagnosis with the patient and recommend she follow-up with her PCP. Patient was provided with 2 cock up wrist splints that she should wear both while sleeping and wear one at a time while working. I recommended she follow-up with her primary care doctor for further management as she may require follow-up with a hand surgeon. I also provided the patient with the follow-up phone number for the specialty clinic at [facility] which will have the hand surgeon available for her at that location. Patient indicated understanding and is medically stable for discharge at this time.           [place] Coma Scale  Eye Opening: Spontaneous  Best Verbal Response: Oriented  Best Motor Response: Obeys commands  [place] Coma Scale Score: 15    DIAGNOSTIC RESULTS / EMERGENCYDEPARTMENT COURSE / MDM     LABS:  Labs Reviewed   POC GLUCOSE FINGERSTICK - Abnormal; Notable for the following components:       Result Value    POC Glucose 121 (*)     All other components within normal limits         RADIOLOGY:  No results found. PROCEDURES:  None    CONSULTS:  None      FINAL IMPRESSION      1.  Numbness and tingling in both hands        DISPOSITION / PLAN     DISPOSITION Decision To Discharge 12/14/2022 05:26:09 PM     PATIENT REFERRED TO:  [facility], [address] [place], [place] [address] [phone]    In 3 days      [address]  [phone]  Schedule an appointment as soon as possible for a visit in 1 week      DISCHARGE MEDICATIONS:  New Prescriptions    No medications on file       Brian Rajput MD  Emergency Medicine Resident    (Please note that portions of this note were completed with a voice recognition program.Efforts were made to edit the dictations but occasionally words are mis-transcribed.)        Brian Rajput MD  Resident  12/19/22 [id number]

## 2022-12-14 NOTE — Clinical Note
Laurie Holder was seen and treated in our emergency department on 12/14/2022. She may return to work on 12/15/2022. If you have any questions or concerns, please don't hesitate to call.       Brian Rajput MD

## 2022-12-14 NOTE — ED PROVIDER NOTES
[facility] [facility]     Emergency Department     Faculty Attestation        I performed a history and physical examination of the patient and discussed management with the resident. I reviewed the residents note and agree with the documented findings and plan of care. Any areas of disagreement are noted on the chart. I was personally present for the key portions of any procedures. I have documented in the chart those procedures where I was not present during the key portions. I have reviewed the emergency nurses triage note. I agree with the chief complaint, past medical history, past surgical history, allergies, medications, social and family history as documented unless otherwise noted below. For Physician Assistant/ Nurse Practitioner cases/documentation I have personally evaluated this patient and have completed at least one if not all key elements of the E/M (history, physical exam, and MDM). Additional findings are as noted. Vital Signs: BP: (!) 145/95  Heart Rate: 78  Resp: 16  Temp: 97.1 °F (36.2 °C) SpO2: 100 %  PCP:  Wellington Richardson MD    Pertinent Comments:         Critical Care  None      (Please note that portions of this note were completed with a voice recognition program. Efforts were made to edit the dictations but occasionally words are mis-transcribed.  Whenever words are used in this note in any gender, they shall be construed as though they were used in the gender appropriate to the circumstances; and whenever words are used in this note in the singular or plural form, they shall be construed as though they were used in the form appropriate to the circumstances.)    MD Elizabeth Bal  Attending Emergency Medicine Physician           Melanie Campos MD  12/14/22 [id number]

## 2022-12-14 NOTE — ED TRIAGE NOTES
Patient states that she has bilateral hand and wrist numbness over the last week.   Reports that she has not been able to check her sugars at home and they may be high

## 2022-12-19 ASSESSMENT — ENCOUNTER SYMPTOMS
NAUSEA: 0
COUGH: 0
VOMITING: 0
SORE THROAT: 0
SHORTNESS OF BREATH: 0
RHINORRHEA: 0
ABDOMINAL PAIN: 0
DIARRHEA: 0

## 2022-12-20 ENCOUNTER — OFFICE VISIT (OUTPATIENT)
Dept: INTERNAL MEDICINE | Age: 32
End: 2022-12-20
Payer: MEDICARE

## 2022-12-20 VITALS
WEIGHT: 293 LBS | HEART RATE: 84 BPM | DIASTOLIC BLOOD PRESSURE: 96 MMHG | TEMPERATURE: 97.2 F | HEIGHT: 69 IN | SYSTOLIC BLOOD PRESSURE: 143 MMHG | OXYGEN SATURATION: 99 % | BODY MASS INDEX: 43.4 KG/M2

## 2022-12-20 DIAGNOSIS — Z23 NEED FOR HEPATITIS B VACCINATION: ICD-10-CM

## 2022-12-20 DIAGNOSIS — G56.03 BILATERAL CARPAL TUNNEL SYNDROME: Primary | ICD-10-CM

## 2022-12-20 PROCEDURE — G0010 ADMIN HEPATITIS B VACCINE: HCPCS | Performed by: INTERNAL MEDICINE

## 2022-12-20 PROCEDURE — 99213 OFFICE O/P EST LOW 20 MIN: CPT | Performed by: STUDENT IN AN ORGANIZED HEALTH CARE EDUCATION/TRAINING PROGRAM

## 2022-12-20 PROCEDURE — 4004F PT TOBACCO SCREEN RCVD TLK: CPT | Performed by: STUDENT IN AN ORGANIZED HEALTH CARE EDUCATION/TRAINING PROGRAM

## 2022-12-20 PROCEDURE — G8417 CALC BMI ABV UP PARAM F/U: HCPCS | Performed by: STUDENT IN AN ORGANIZED HEALTH CARE EDUCATION/TRAINING PROGRAM

## 2022-12-20 PROCEDURE — G8482 FLU IMMUNIZE ORDER/ADMIN: HCPCS | Performed by: STUDENT IN AN ORGANIZED HEALTH CARE EDUCATION/TRAINING PROGRAM

## 2022-12-20 PROCEDURE — G8427 DOCREV CUR MEDS BY ELIG CLIN: HCPCS | Performed by: STUDENT IN AN ORGANIZED HEALTH CARE EDUCATION/TRAINING PROGRAM

## 2022-12-20 PROCEDURE — 3078F DIAST BP <80 MM HG: CPT | Performed by: STUDENT IN AN ORGANIZED HEALTH CARE EDUCATION/TRAINING PROGRAM

## 2022-12-20 PROCEDURE — 3074F SYST BP LT 130 MM HG: CPT | Performed by: STUDENT IN AN ORGANIZED HEALTH CARE EDUCATION/TRAINING PROGRAM

## 2022-12-20 RX ORDER — GABAPENTIN 100 MG/1
100 CAPSULE ORAL 2 TIMES DAILY
Qty: 30 CAPSULE | Refills: 0 | Status: SHIPPED | OUTPATIENT
Start: 2022-12-20 | End: 2023-01-04

## 2022-12-20 NOTE — PROGRESS NOTES
[facility] [address] [address]  Dept: [phone]  Dept Fax: [phone]    Office Progress/Follow Up Note  Date of patient's visit: 12/20/2022  Patient's Name:  Soni Garner YOB: 1990            Patient Care Team:  Gurinder Peña MD as PCP - General (Internal Medicine)  Candido Carrera DPM as Physician (Podiatry)  ________________________________________________________________________      Reason for Visit: Routine outpatient follow up/Same day visit/Post hospital/ED visit: ER visit fu  ________________________________________________________________________  Chief Complaint:  Numbness (Pt states she has numbness and pain in both hands ) and Health Maintenance (Pt would like Hep B vaccine /Pt does not have eye Dr)    ________________________________________________________________________  History of Presenting Illness:  History was obtained from: patient. Soni Garner is a 33 y.o. is here for an ER visit follow-up for bilateral carpal tunnel syndrome. Patient was seen in ER almost a week ago for numbness and tingling in bilateral hands going up to her elbows. Tinel's and Phalen's test is positive in the office today concerning for bilateral carpal tunnel syndrome. Patient report that she recently changed her job and has been using her hands more frequently at work. She reports having similar episodes of numbness and tingling back in 2017 as well. Her medical issues include diabetes mellitus, bipolar disorder, morbid obesity.     Patient Active Problem List   Diagnosis    Asthma    Anxiety and depression    Morbid obesity with BMI of 50.0-59.9, adult ([facility])    Seasonal allergies    Bipolar disorder, mixed ([address]    Cannabis abuse    Prediabetes    Irregular menses    PCOS (polycystic ovarian syndrome)    ASCUS w/ NEG HR HPV    Mirena IUD insertion 1/7/19    Chronic hypertension    Chest pain    SHOBHA (obstructive sleep apnea) Health Maintenance Due   Topic Date Due    Hepatitis B vaccine (1 of 3 - Risk 3-dose series) Never done    Diabetic retinal exam  11/24/2016    Diabetic microalbuminuria test  01/10/2020    COVID-19 Vaccine (4 - Booster for Pfizer series) 09/20/2022       Allergies   Allergen Reactions    Latex      Rash     Fruit & Vegetable Daily [Nutritional Supplements]      Cataloupe,honeydew,grapes,watermelon,bananas,apples,pineapples    Seasonal          Current Outpatient Medications   Medication Sig Dispense Refill    albuterol sulfate HFA (VENTOLIN HFA) 108 (90 Base) MCG/ACT inhaler Inhale 2 puffs into the lungs every 6 hours as needed for Wheezing 1 each 1    Magnesium 400 MG CAPS Take 400 mg by mouth daily 30 capsule 2    amLODIPine (NORVASC) 5 MG tablet take 1 tablet by mouth once daily 30 tablet 0    metFORMIN (GLUCOPHAGE) 500 MG tablet Take 1 tablet by mouth 2 times daily (with meals) 60 tablet 0    Diabetic Shoe MISC Diabetic shoes with custom inserts. Diagnosis 2 each 0    clotrimazole-betamethasone (LOTRISONE) 1-0.05 % cream Apply topically 2 times daily. 45 g 2    hydrOXYzine (VISTARIL) 50 MG capsule Take 50 mg by mouth 3 times daily as needed      hydrocortisone valerate (WESTCORT) 0.2 % ointment Apply topically daily.  1 Tube 3    FLUoxetine (PROZAC) 40 MG capsule Take 1 capsule by mouth daily 30 capsule 0    ziprasidone (GEODON) 40 MG capsule Take 1 capsule by mouth 2 times daily (with meals) 60 capsule 0    traZODone (DESYREL) 50 MG tablet Take 1 tablet by mouth nightly as needed for Sleep 30 tablet 0    potassium chloride (KLOR-CON M) 20 MEQ extended release tablet Take 1 tablet by mouth daily 30 tablet 1     Current Facility-Administered Medications   Medication Dose Route Frequency Provider Last Rate Last Admin    levonorgestrel (MIRENA) IUD 52 mg 1 each  1 each IntraUTERine Once Wilmar Patron   1 each at 01/07/19 6574       Social History     Tobacco Use    Smoking status: Every Day     Packs/day: 0.50     Years: 10.00     Pack years: 5.00     Types: Cigarettes    Smokeless tobacco: Never   Substance Use Topics    Alcohol use: Yes     Comment: socially    Drug use: Yes     Types: Cocaine       Family History   Problem Relation Age of Onset    High Blood Pressure Mother     Diabetes Maternal Grandfather     High Blood Pressure Maternal Grandfather     Schizophrenia Maternal Aunt     Bipolar Disorder Maternal Aunt     Schizophrenia Maternal Aunt     Colon Cancer Neg Hx     Breast Cancer Neg Hx     Uterine Cancer Neg Hx     Ovarian Cancer Neg Hx       ________________________________________________________________________  Review of Systems:  CONSTITUTIONAL: Denies: fever, chills  PSYCH: Denies: anxiety, depression  ALLERGIES: Denies: urticaria  EYES: Denies: blurry vision, decreased vision, photophobia  ENT: Denies: sore throat, nasal congestion  CARDIOVASCULAR: Denies: chest pain, dyspnea on exertion  RESPIRATORY: Denies: cough, hemoptysis, shortness of breath  GI: Denies: Denies: abdominal pain, flank pain  : Denies: Denies: dysuria, frequency/urgency  NEURO: Numbness and tingling in bilateral hands  MUSCULOSKELETAL: Numbness and tingling in bilateral hands  SKIN: Denies: rash, itching  ________________________________________________________________________  Physical Exam:  Vitals:    12/20/22 1517 12/20/22 1519   BP: (!) 137/90 (!) 143/96   Site: Right Upper Arm Left Upper Arm   Position: Sitting Sitting   Cuff Size: Large Adult Large Adult   Pulse: 84    Temp: 97.2 °F (36.2 °C)    TempSrc: Infrared    SpO2: 99%    Weight: (!) 335 lb (152 kg)    Height: 5' 9\" (1.753 m)      BP Readings from Last 3 Encounters:   12/20/22 (!) 143/96   12/14/22 (!) 145/95   11/18/22 (!) 134/104      Alert and oriented x3  Morbid obesity  Positive Tinel's and Phalen's test  Equal motor strength in bilateral upper and lower extremities  No joint involvement  No skin rash    ________________________________________________________________________  Diagnostic findings:  CBC:  Lab Results   Component Value Date/Time    WBC 10.0 05/14/2022 03:44 PM    HGB 12.8 05/14/2022 03:44 PM     05/14/2022 03:44 PM     06/01/2012 09:54 PM       BMP:    Lab Results   Component Value Date/Time     12/14/2022 04:20 PM    K 4.2 12/14/2022 04:20 PM     12/14/2022 04:20 PM    CO2 23 12/14/2022 04:20 PM    BUN 15 12/14/2022 04:20 PM    CREATININE 0.97 12/14/2022 04:20 PM    GLUCOSE 123 12/14/2022 04:20 PM    GLUCOSE 157 06/01/2012 09:54 PM       HEMOGLOBIN A1C:   Lab Results   Component Value Date/Time    LABA1C 7.0 12/14/2022 04:20 PM       FASTING LIPID PANEL:  Lab Results   Component Value Date    CHOL 231 (H) 01/10/2019    HDL 44 12/14/2022    TRIG 124 01/10/2019     ________________________________________________________________________  Assessment and Plan:  Roshan Miguel was seen today for numbness and health maintenance. Diagnoses and all orders for this visit:    Bilateral carpal tunnel syndrome: ER visit follow up. Positive Tinel's and Phalen's test concerning for bilateral carpal tunnel syndrome. Will order EMG and follow-up with hand surgeon. Given gabapentin 100 mg twice daily on patient's request only for 2 weeks. This medication is only for one time supply of 2 weeks and not to be refilled. Educated about the side effects. Patient will follow-up with her regular PCP in subsequent visits. - [id number]ine Ennis, DO, Orthopedic Surgery (Hand), [facility]  -     EMG; Future    Need for hepatitis B vaccination  -     Hep B, ENGERIX-B, (age birth-19 yrs), IM, 0.5mL 3-dose      ________________________________________________________________________  Follow up and instructions:  No follow-ups on file. Discussed use, benefit, and side effects of prescribed medications. Barriers to medication compliance addressed. All patient questions answered.   Pt voiced understanding. Patient given educational materials - see patient instructions    Mile Randhawa MD      Department of Internal Medicine  [facility], [place]         12/20/2022, 3:50 PM  Attending Physician Statement  I have discussed the care of Uriah Mercado, including pertinent history and exam findings,  with the resident. I have reviewed the key elements of all parts of the encounter with the resident. I agree with the assessment, plan and orders as documented by the resident. (GE Modifier)  Hand sxs may be exacerbated by her employment at [address] [place] [address]. Ortho referral and EMGs planned. This note is created with the assistance of a speech-recognition program. While intending to generate a document that actually reflects the content of the visit, the document can still have some mistakes which may not have been identified and corrected by editing.

## 2023-01-05 ENCOUNTER — OFFICE VISIT (OUTPATIENT)
Dept: INTERNAL MEDICINE | Age: 33
End: 2023-01-05
Payer: MEDICARE

## 2023-01-05 VITALS
HEART RATE: 80 BPM | DIASTOLIC BLOOD PRESSURE: 81 MMHG | BODY MASS INDEX: 43.4 KG/M2 | OXYGEN SATURATION: 97 % | HEIGHT: 69 IN | TEMPERATURE: 97 F | WEIGHT: 293 LBS | SYSTOLIC BLOOD PRESSURE: 139 MMHG

## 2023-01-05 DIAGNOSIS — F31.60 BIPOLAR DISORDER, MIXED (HCC): ICD-10-CM

## 2023-01-05 DIAGNOSIS — E11.69 DIABETES MELLITUS TYPE 2 IN OBESE (HCC): ICD-10-CM

## 2023-01-05 DIAGNOSIS — G56.03 BILATERAL CARPAL TUNNEL SYNDROME: Primary | ICD-10-CM

## 2023-01-05 DIAGNOSIS — E66.9 DIABETES MELLITUS TYPE 2 IN OBESE (HCC): ICD-10-CM

## 2023-01-05 DIAGNOSIS — J45.20 MILD INTERMITTENT ASTHMA WITHOUT COMPLICATION: ICD-10-CM

## 2023-01-05 PROCEDURE — 99211 OFF/OP EST MAY X REQ PHY/QHP: CPT | Performed by: INTERNAL MEDICINE

## 2023-01-05 ASSESSMENT — ENCOUNTER SYMPTOMS
EYES NEGATIVE: 1
NAUSEA: 0
BACK PAIN: 0
DIARRHEA: 0
ABDOMINAL PAIN: 0
RHINORRHEA: 0
VOICE CHANGE: 0
PHOTOPHOBIA: 0
CHOKING: 0
CONSTIPATION: 0
RESPIRATORY NEGATIVE: 1
EYE PAIN: 0
ALLERGIC/IMMUNOLOGIC NEGATIVE: 1
SHORTNESS OF BREATH: 0
GASTROINTESTINAL NEGATIVE: 1
COLOR CHANGE: 0
EYE REDNESS: 0
APNEA: 0
WHEEZING: 0
VOMITING: 0
EYE ITCHING: 0
SINUS PAIN: 0
FACIAL SWELLING: 0
CHEST TIGHTNESS: 0
STRIDOR: 0
ABDOMINAL DISTENTION: 0
COUGH: 0
EYE DISCHARGE: 0

## 2023-01-05 ASSESSMENT — PATIENT HEALTH QUESTIONNAIRE - PHQ9
8. MOVING OR SPEAKING SO SLOWLY THAT OTHER PEOPLE COULD HAVE NOTICED. OR THE OPPOSITE, BEING SO FIGETY OR RESTLESS THAT YOU HAVE BEEN MOVING AROUND A LOT MORE THAN USUAL: 0
3. TROUBLE FALLING OR STAYING ASLEEP: 0
2. FEELING DOWN, DEPRESSED OR HOPELESS: 0
SUM OF ALL RESPONSES TO PHQ9 QUESTIONS 1 & 2: 0
1. LITTLE INTEREST OR PLEASURE IN DOING THINGS: 0
6. FEELING BAD ABOUT YOURSELF - OR THAT YOU ARE A FAILURE OR HAVE LET YOURSELF OR YOUR FAMILY DOWN: 0
9. THOUGHTS THAT YOU WOULD BE BETTER OFF DEAD, OR OF HURTING YOURSELF: 0
7. TROUBLE CONCENTRATING ON THINGS, SUCH AS READING THE NEWSPAPER OR WATCHING TELEVISION: 0
SUM OF ALL RESPONSES TO PHQ QUESTIONS 1-9: 0
4. FEELING TIRED OR HAVING LITTLE ENERGY: 0
SUM OF ALL RESPONSES TO PHQ QUESTIONS 1-9: 0
5. POOR APPETITE OR OVEREATING: 0
SUM OF ALL RESPONSES TO PHQ QUESTIONS 1-9: 0
SUM OF ALL RESPONSES TO PHQ QUESTIONS 1-9: 0

## 2023-01-05 NOTE — PROGRESS NOTES
Attending Physician Statement  I have discussed the care of Allyson Gupta including pertinent history and exam findings,  with the resident. I have reviewed the key elements of all parts of the encounter with the resident. I agree with the assessment, plan and orders as documented by the resident.   (GE Modifier)    MD INGRID Byrd  Attending Physician, [address] [place] [address], Internal Medicine Residency Program  [address] [place] [address]  1/5/2023, 4:24 PM

## 2023-01-05 NOTE — PROGRESS NOTES
[facility] [address] [address]  Dept: [phone]  Dept Fax: [phone]    Office Progress/Follow Up Note  Date ofpatient's visit: 1/5/2023  Patient's Name:  Samanta Moffett YOB: 1990            Patient Care Team:  Krystyna Reyna MD as PCP - General (Internal Medicine)  Jojo Choe DPM as Physician (Podiatry)  ================================================================    REASON FOR VISIT/CHIEF COMPLAINT:  Carpal Tunnel (Still reoccurring )    HISTORY OF PRESENTING ILLNESS:  History was obtained from: patient, electronic medical record. Yajaira ramirez 33 y.o. is here for a follow-up visit. She continues to have bilateral wrist pain, numbness and tingling in her fingers. In the last office visit, orders were placed for EMG, Ortho referral and gabapentin for pain relief, which did help her with her symptoms somewhat. She has resigned from her job because she was not able to use her hands much. She is going to schedule appointment for EMG testing. TSH was within normal limits. Denies any other new symptoms. She has history of type 2 diabetes mellitus, hemoglobin A1c increased from 6.8-7. She is on metformin 500 mg twice daily. She had slightly elevated blood pressure, 139/81. She did not take her amlodipine 5 Mg today. Is compliant with her CPAP machine. Will be scheduling appointment next week for Pap smear.     Patient Active Problem List   Diagnosis    Asthma    Anxiety and depression    Morbid obesity with BMI of 50.0-59.9, adult (HCC)    Seasonal allergies    Bipolar disorder, mixed ([address]    Cannabis abuse    Prediabetes    Irregular menses    PCOS (polycystic ovarian syndrome)    ASCUS w/ NEG HR HPV    Mirena IUD insertion 1/7/19    Chronic hypertension    Chest pain    SHOBHA (obstructive sleep apnea)    Diabetes mellitus type 2 in obese [facility])       Health Maintenance Due   Topic Date Due    Diabetic Alb to Cr ratio (uACR) test  Never done    Hepatitis B vaccine (1 of 3 - Risk 3-dose series) 01/26/2010    Diabetic retinal exam  11/24/2016    COVID-19 Vaccine (4 - Booster for Pfizer series) 09/20/2022       Allergies   Allergen Reactions    Latex      Rash     Fruit & Vegetable Daily [Nutritional Supplements]      Cataloupe,honeydew,grapes,watermelon,bananas,apples,pineapples    Seasonal          Current Outpatient Medications   Medication Sig Dispense Refill    gabapentin (NEURONTIN) 100 MG capsule Take 1 capsule by mouth in the morning and at bedtime for 15 days. Will cause drowsiness. Do not drive if taking. 30 capsule 0    albuterol sulfate HFA (VENTOLIN HFA) 108 (90 Base) MCG/ACT inhaler Inhale 2 puffs into the lungs every 6 hours as needed for Wheezing 1 each 1    Magnesium 400 MG CAPS Take 400 mg by mouth daily 30 capsule 2    amLODIPine (NORVASC) 5 MG tablet take 1 tablet by mouth once daily 30 tablet 0    metFORMIN (GLUCOPHAGE) 500 MG tablet Take 1 tablet by mouth 2 times daily (with meals) 60 tablet 0    potassium chloride (KLOR-CON M) 20 MEQ extended release tablet Take 1 tablet by mouth daily 30 tablet 1    clotrimazole-betamethasone (LOTRISONE) 1-0.05 % cream Apply topically 2 times daily. 45 g 2    hydrOXYzine (VISTARIL) 50 MG capsule Take 50 mg by mouth 3 times daily as needed      hydrocortisone valerate (WESTCORT) 0.2 % ointment Apply topically daily. 1 Tube 3    FLUoxetine (PROZAC) 40 MG capsule Take 1 capsule by mouth daily 30 capsule 0    ziprasidone (GEODON) 40 MG capsule Take 1 capsule by mouth 2 times daily (with meals) 60 capsule 0    traZODone (DESYREL) 50 MG tablet Take 1 tablet by mouth nightly as needed for Sleep 30 tablet 0    Diabetic Shoe MISC Diabetic shoes with custom inserts.   Diagnosis 2 each 0     Current Facility-Administered Medications   Medication Dose Route Frequency Provider Last Rate Last Admin    levonorgestrel (MIRENA) IUD 52 mg 1 each  1 each IntraUTERine Once Margene Shelter 1 each at 01/07/19 1644       Social History     Tobacco Use    Smoking status: Every Day     Packs/day: 0.50     Years: 10.00     Pack years: 5.00     Types: Cigarettes    Smokeless tobacco: Never   Substance Use Topics    Alcohol use: Yes     Comment: socially    Drug use: Yes     Types: Cocaine       Family History   Problem Relation Age of Onset    High Blood Pressure Mother     Diabetes Maternal Grandfather     High Blood Pressure Maternal Grandfather     Schizophrenia Maternal Aunt     Bipolar Disorder Maternal Aunt     Schizophrenia Maternal Aunt     Colon Cancer Neg Hx     Breast Cancer Neg Hx     Uterine Cancer Neg Hx     Ovarian Cancer Neg Hx         Past Medical History:   Diagnosis Date    Asthma     Has not needed inhaler since 2009    Bipolar disorder, mixed ([facility]     Cannabis abuse 12/31/2016    Chronic hypertension 11/25/2019    Depression     Diabetes mellitus ([facility] 11/20/2013    borderline    Obesity 08/01/2013    Polycystic ovarian disease         Past Surgical History:   Procedure Laterality Date    TONSILLECTOMY AND ADENOIDECTOMY      WISDOM TOOTH EXTRACTION          REVIEW OF SYSTEMS:  Review of Systems   Constitutional:  Positive for activity change and fatigue. Negative for appetite change, chills, diaphoresis, fever and unexpected weight change. HENT: Negative. Negative for congestion, facial swelling, hearing loss, postnasal drip, rhinorrhea, sinus pain, tinnitus and voice change. Eyes: Negative. Negative for photophobia, pain, discharge, redness, itching and visual disturbance. Respiratory: Negative. Negative for apnea, cough, choking, chest tightness, shortness of breath, wheezing and stridor. Cardiovascular: Negative. Negative for chest pain, palpitations and leg swelling. Gastrointestinal: Negative. Negative for abdominal distention, abdominal pain, constipation, diarrhea, nausea and vomiting. Endocrine: Negative.   Negative for cold intolerance, heat intolerance and polyuria. Genitourinary: Negative. Negative for difficulty urinating, dysuria, frequency and urgency. Musculoskeletal: Negative. Negative for arthralgias, back pain, gait problem and myalgias. Skin: Negative. Negative for color change, pallor, rash and wound. Allergic/Immunologic: Negative. Neurological: Negative. Negative for dizziness, tremors, seizures, syncope, facial asymmetry, speech difficulty, weakness, light-headedness, numbness and headaches. Hematological: Negative. Negative for adenopathy. Does not bruise/bleed easily. Psychiatric/Behavioral: Negative. Negative for agitation, behavioral problems, confusion, decreased concentration, dysphoric mood, hallucinations, self-injury, sleep disturbance and suicidal ideas. The patient is not nervous/anxious and is not hyperactive. PHYSICAL EXAM:  Vitals:    01/05/23 1536   BP: 139/81   Site: Right Upper Arm   Position: Sitting   Cuff Size: Large Adult   Pulse: 80   Temp: 97 °F (36.1 °C)   SpO2: 97%   Weight: (!) 332 lb (150.6 kg)   Height: 5' 9\" (1.753 m)     BP Readings from Last 3 Encounters:   01/05/23 139/81   12/20/22 (!) 143/96   12/14/22 (!) 145/95        Physical Exam  Vitals and nursing note reviewed. Constitutional:       General: She is not in acute distress. Appearance: Normal appearance. She is not ill-appearing, toxic-appearing or diaphoretic. HENT:      Head: Normocephalic and atraumatic. Nose: Nose normal. No congestion or rhinorrhea. Mouth/Throat:      Mouth: Mucous membranes are dry. Pharynx: Oropharynx is clear. No oropharyngeal exudate or posterior oropharyngeal erythema. Eyes:      General: No scleral icterus. Right eye: No discharge. Left eye: No discharge. Extraocular Movements: Extraocular movements intact. Conjunctiva/sclera: Conjunctivae normal.      Pupils: Pupils are equal, round, and reactive to light.    Cardiovascular:      Rate and Rhythm: Normal rate and regular rhythm. Pulses: Normal pulses. Heart sounds: Normal heart sounds. No friction rub. Pulmonary:      Effort: Pulmonary effort is normal. No respiratory distress. Breath sounds: Normal breath sounds. No stridor. No wheezing, rhonchi or rales. Chest:      Chest wall: No tenderness. Abdominal:      General: Bowel sounds are normal. There is no distension. Palpations: Abdomen is soft. Tenderness: There is no abdominal tenderness. Musculoskeletal:         General: Normal range of motion. Cervical back: Normal range of motion. No rigidity. Right lower leg: No edema. Left lower leg: No edema. Comments: Tinel and phalen test positive    Skin:     General: Skin is warm and dry. Capillary Refill: Capillary refill takes less than 2 seconds. Coloration: Skin is not jaundiced or pale. Findings: No bruising, erythema, lesion or rash. Neurological:      General: No focal deficit present. Mental Status: She is alert and oriented to person, place, and time. Mental status is at baseline. Cranial Nerves: No cranial nerve deficit. Sensory: No sensory deficit. Motor: No weakness. Coordination: Coordination normal.      Gait: Gait normal.      Deep Tendon Reflexes: Reflexes normal.   Psychiatric:         Mood and Affect: Mood normal.         Behavior: Behavior normal.         Thought Content:  Thought content normal.         Judgment: Judgment normal.         DIAGNOSTIC FINDINGS:  CBC:  Lab Results   Component Value Date/Time    WBC 10.0 05/14/2022 03:44 PM    HGB 12.8 05/14/2022 03:44 PM     05/14/2022 03:44 PM     06/01/2012 09:54 PM       BMP:    Lab Results   Component Value Date/Time     12/14/2022 04:20 PM    K 4.2 12/14/2022 04:20 PM     12/14/2022 04:20 PM    CO2 23 12/14/2022 04:20 PM    BUN 15 12/14/2022 04:20 PM    CREATININE 0.97 12/14/2022 04:20 PM    GLUCOSE 123 12/14/2022 04:20 PM    GLUCOSE 157 06/01/2012 09:54 PM       HEMOGLOBIN A1C:   Lab Results   Component Value Date/Time    LABA1C 7.0 12/14/2022 04:20 PM       FASTING LIPID PANEL:  Lab Results   Component Value Date    CHOL 231 (H) 01/10/2019    HDL 44 12/14/2022    TRIG 124 01/10/2019        Diagnosis Orders   1. Bilateral carpal tunnel syndrome        2. Diabetes mellitus type 2 in obese ([facility]        3. Body mass index (BMI) 45.0-49.9, adult ([facility]        4. Bipolar disorder, mixed ([facility]        5. Mild intermittent asthma without complication             ASSESSMENT AND PLAN:  Slim Chris was seen today for carpal tunnel. Diagnoses and all orders for this visit:    Bilateral carpal tunnel syndrome  -Likely secondary to obesity. TSH within normal limits. -EMG testing ordered  -Voltaren gel as needed  -Follow-up with Ortho outpatient. Diabetes mellitus type 2 in obese (HCC)  -Continue metformin 500 mg twice daily    Obstructive sleep apnea  -Discussed compliance with CPAP machine    Bipolar disorder, mixed (HCC)  -[facility]  -Geodon 40, Prozac 20 and trazodone 50 Mg     Mild intermittent asthma without complication  -Continue as needed albuterol    Pap smear in 2 weeks. FOLLOW UP AND INSTRUCTIONS:  No follow-ups on file. Slim Chris received counseling on the following healthy behaviors: nutrition, exercise, medication adherence, and tobacco cessation    Discussed use, benefit, and side effects of prescribed medications. Barriers to medication compliance addressed. All patient questions answered. Pt voiced understanding. Patient given educational materials - see patient instructions  Ashley Rivera MD   Internal Medicine  1/5/2023, 4:34 PM    This note is created with the assistance of a speech-recognition program. While intending to generate a document that actually reflects the content of thevisit, the document can still have some mistakes which may not have been identified and corrected by editing.

## 2023-01-08 DIAGNOSIS — G56.03 BILATERAL CARPAL TUNNEL SYNDROME: ICD-10-CM

## 2023-01-09 RX ORDER — GABAPENTIN 100 MG/1
CAPSULE ORAL
Qty: 30 CAPSULE | Refills: 0 | Status: SHIPPED | OUTPATIENT
Start: 2023-01-09 | End: 2023-02-08

## 2023-01-09 NOTE — TELEPHONE ENCOUNTER
Request for Gabapentin. Next Visit Date:  Future Appointments   Date Time Provider Lizeth De Souza   1/11/2023 10:10 AM SCHEDULE, [facility]   1/20/2023  1:30 PM SCHEDULE, [facility] NURSE [facility]   [facility]26[facility]2023  1[facility]00 [facility]   2/20/2023  2:00 PM Jarred Tan DPM [facility] [address] Maintenance   Topic Date Due    Diabetic Alb to Cr ratio (uACR) test  Never done    Hepatitis B vaccine (1 of 3 - Risk 3-dose series) 01/26/2010    Diabetic retinal exam  11/24/2016    COVID-19 Vaccine (4 - Booster for Pfizer series) 09/20/2022    Diabetic foot exam  09/23/2023    Cervical cancer screen  12/11/2023    A1C test (Diabetic or Prediabetic)  12/14/2023    Lipids  12/14/2023    GFR test (Diabetes, CKD 3-4, OR last GFR 15-59)  12/14/2023    Depression Monitoring  01/05/2024    DTaP/Tdap/Td vaccine (2 - Td or Tdap) 12/03/2026    Flu vaccine  Completed    Pneumococcal 0-64 years Vaccine  Completed    Hepatitis C screen  Completed    HIV screen  Completed    Hepatitis A vaccine  Aged Out    Hib vaccine  Aged Out    Meningococcal (ACWY) vaccine  Aged Out    Varicella vaccine  Discontinued       Hemoglobin A1C (%)   Date Value   12/14/2022 7.0 (H)   05/24/2022 6.8   08/11/2020 5.4             ( goal A1C is < 7)   Microalb/Crt.  Ratio (mcg/mg creat)   Date Value   01/10/2019 CANNOT BE CALCULATED     LDL Cholesterol (mg/dL)   Date Value   12/14/2022 149 (H)       (goal LDL is <100)   AST (U/L)   Date Value   11/02/2022 20     ALT (U/L)   Date Value   11/02/2022 20     BUN (mg/dL)   Date Value   12/14/2022 15     BP Readings from Last 3 Encounters:   01/05/23 139/81   12/20/22 (!) 143/96   12/14/22 (!) 145/95          (goal 120/80)    All Future Testing planned in [facility]  Lab Frequency Next Occurrence   CBC with Auto Differential Once 06/08/2022   XR CHEST (SINGLE VIEW FRONTAL) Once 06/08/2022   Microalbumin, Ur Once 11/09/2022   EMG Once 12/20/2022         Patient Active Problem List:     Asthma     Anxiety and depression     Morbid obesity with BMI of 50.0-59.9, adult (HCC)     Seasonal allergies     Bipolar disorder, mixed ([facility]     Cannabis abuse     Prediabetes     Irregular menses     PCOS (polycystic ovarian syndrome)     ASCUS w/ NEG HR HPV     Mirena IUD insertion 1/7/19     Chronic hypertension     Chest pain     SHOBHA (obstructive sleep apnea)     Diabetes mellitus type 2 in obese ([facility]

## 2023-01-09 NOTE — TELEPHONE ENCOUNTER
Please let the patient that Gabapentin  is not recommended long term therapy. Please ask her to schedule appointment with orthopedic for further evaluation and management- possible steroid injection as appropriate. Also to get the EMG done which would be really help in diagnosis.

## 2023-04-08 ENCOUNTER — HOSPITAL ENCOUNTER (EMERGENCY)
Age: 33
Discharge: HOME | End: 2023-04-08
Attending: EMERGENCY MEDICINE
Payer: MEDICARE

## 2023-04-08 VITALS
SYSTOLIC BLOOD PRESSURE: 144 MMHG | OXYGEN SATURATION: 100 % | HEART RATE: 112 BPM | DIASTOLIC BLOOD PRESSURE: 92 MMHG | RESPIRATION RATE: 18 BRPM | TEMPERATURE: 97.5 F

## 2023-04-08 DIAGNOSIS — N30.00 ACUTE CYSTITIS WITHOUT HEMATURIA: Primary | ICD-10-CM

## 2023-04-08 DIAGNOSIS — B37.31 VAGINAL CANDIDIASIS: ICD-10-CM

## 2023-04-08 LAB
BACTERIA: ABNORMAL
BILIRUBIN URINE: NEGATIVE
CANDIDA SPECIES, DNA PROBE: POSITIVE
COLOR: YELLOW
EPITHELIAL CELLS UA: ABNORMAL /HPF (ref 0–5)
GARDNERELLA VAGINALIS, DNA PROBE: NEGATIVE
GLUCOSE UR STRIP.AUTO-MCNC: ABNORMAL MG/DL
HCG(URINE) PREGNANCY TEST: NEGATIVE
KETONES UR STRIP.AUTO-MCNC: NEGATIVE MG/DL
LEUKOCYTE ESTERASE UR QL STRIP.AUTO: ABNORMAL
NITRITE UR QL STRIP.AUTO: NEGATIVE
PROT UR STRIP.AUTO-MCNC: 5.5 MG/DL (ref 5–8)
PROT UR STRIP.AUTO-MCNC: NEGATIVE MG/DL
RBC CLUMPS #/AREA URNS AUTO: ABNORMAL /HPF (ref 0–4)
SOURCE: ABNORMAL
SPECIFIC GRAVITY UA: 1.03 (ref 1–1.03)
TRICHOMONAS VAGINALIS DNA: NEGATIVE
TURBIDITY: CLEAR
URINE HGB: NEGATIVE
UROBILINOGEN, URINE: NORMAL
WBC UA: ABNORMAL /HPF (ref 0–5)

## 2023-04-08 PROCEDURE — 87086 URINE CULTURE/COLONY COUNT: CPT

## 2023-04-08 PROCEDURE — 87491 CHLMYD TRACH DNA AMP PROBE: CPT

## 2023-04-08 PROCEDURE — 87660 TRICHOMONAS VAGIN DIR PROBE: CPT

## 2023-04-08 PROCEDURE — 87591 N.GONORRHOEAE DNA AMP PROB: CPT

## 2023-04-08 PROCEDURE — 87480 CANDIDA DNA DIR PROBE: CPT

## 2023-04-08 PROCEDURE — 87510 GARDNER VAG DNA DIR PROBE: CPT

## 2023-04-08 PROCEDURE — 81025 URINE PREGNANCY TEST: CPT

## 2023-04-08 PROCEDURE — 81001 URINALYSIS AUTO W/SCOPE: CPT

## 2023-04-08 PROCEDURE — 86403 PARTICLE AGGLUT ANTBDY SCRN: CPT

## 2023-04-08 RX ORDER — NITROFURANTOIN 25; 75 MG/1; MG/1
100 CAPSULE ORAL 2 TIMES DAILY
Qty: 10 CAPSULE | Refills: 0 | Status: SHIPPED | OUTPATIENT
Start: 2023-04-08 | End: 2023-04-13

## 2023-04-08 ASSESSMENT — ENCOUNTER SYMPTOMS: ABDOMINAL PAIN: 0

## 2023-04-08 NOTE — ED NOTES
Pt reports to the ED with complaints of dysuria, urinary frequency and vaginal itching x5 days. Pt denies hematuria but states when she wipes, she sees like a drop of blood. Pt denies any other bleeding at this time. Pt does not have any other complaints at this time. Pt is A&O x4 and speaking in complete sentences. Pt is resting in bed comfortably, NAD noted. Pt denies chest pain, SOB.  Care ongoing       Christina Bello RN  04/08/23 0618

## 2023-04-08 NOTE — ED NOTES
Report given to [facility], all questions answered     Austin Daniels, BIJU  04/08/23 7444

## 2023-04-08 NOTE — ED NOTES
The following labs were labeled with appropriate pt sticker and tubed to lab:     [] Blue     [] Lavender   [] on ice  [] Green/yellow  [] Green/black [] on ice  [] Rhae Coppock  [] on ice  [] Yellow  [] Red  [] Type/ Screen  [] ABG  [] VBG    [] COVID-19 swab    [] Rapid  [] PCR  [] Flu swab  [] Peds Viral Panel     [] Urine Sample  [] Fecal Sample  [x] Pelvic Cultures  [] Blood Cultures  [] X 2  [] STREP Cultures       Ruthie Ramon RN  04/08/23 8188

## 2023-04-08 NOTE — ED NOTES
The following labs were labeled with appropriate pt sticker and tubed to lab:     [] Blue     [] Lavender   [] on ice  [] Green/yellow  [] Green/black [] on ice  [] Abby Soulier  [] on ice  [] Yellow  [] Red  [] Type/ Screen  [] ABG  [] VBG    [] COVID-19 swab    [] Rapid  [] PCR  [] Flu swab  [] Peds Viral Panel     [x] Urine Sample  [] Fecal Sample  [] Pelvic Cultures  [] Blood Cultures  [] X 2  [] STREP Cultures       Armin Rodriges RN  04/08/23 3799

## 2023-04-09 LAB
MICROORGANISM SPEC CULT: ABNORMAL
SPECIMEN DESCRIPTION: ABNORMAL

## 2023-04-09 NOTE — ED PROVIDER NOTES
Making  Vaginal discharge and dysuria - concern for UTI vs vaginitis vs STI. Check UA  Pelvic exam with vaginitis probe and G/C  HcG    Patient does have some signs of dehydration and does report very little water intake. Did discuss electrolyte replacements and other alternatives to stay hdyrated. She is tolerating po no labs or IVFs needed at this time. UA w/ concerns for UTI  Candidiasis infection start intravaginal treatment. [id number] [address] for d/c pending G/C    Problems Addressed:  Acute cystitis without hematuria: acute illness or injury  Vaginal candidiasis: acute illness or injury    Amount and/or Complexity of Data Reviewed  Labs: ordered. Risk  OTC drugs. Prescription drug management.          Critical Care: None     Jennifer Cavazos MD  Attending Emergency Physician        Jennifer Cavazos MD  04/09/23 9252
condition requiring my direct management. Critical care time  minutes, excluding any documented procedures. FINAL IMPRESSION      1. Acute cystitis without hematuria    2. Vaginal candidiasis          DISPOSITION / PLAN     DISPOSITION Decision To Discharge 04/08/2023 04:40:25 AM      PATIENT REFERRED TO:  Jacinda Chavira [address]  [phone]    Schedule an appointment as soon as possible for a visit       [facility] ED  [address] [id number] [phone]    Return to the emergency department if you develop worsening symptoms, fevers, chills, back pain, or any other concerning symptoms.       DISCHARGE MEDICATIONS:  New Prescriptions    MICONAZOLE (MICONAZOLE 7) 2 % VAGINAL CREAM    Place vaginally nightly for 7 days    NITROFURANTOIN, MACROCRYSTAL-MONOHYDRATE, (MACROBID) 100 MG CAPSULE    Take 1 capsule by mouth 2 times daily for 5 days       Roosevelt Faustin MD  Emergency Medicine Resident    (Please note that portions of thisnote were completed with a voice recognition program.  Efforts were made to edit the dictations but occasionally words are mis-transcribed.)       Roosevelt Faustin MD  Resident  04/08/23 2906

## 2023-04-10 NOTE — PROGRESS NOTES
Reviewed patient's urine culture - culture positive for group B strep. Patient was discharged on macrobid 100 mg twice daily x 5 days, and culture is sensitive to prescribed medication. Antibiotic prescribed at discharge is appropriate - no changes made to antibiotic regimen.      Juarez Dong, PharmD   4/10/2023 3:25 PM

## 2023-06-28 ENCOUNTER — HOSPITAL ENCOUNTER (EMERGENCY)
Age: 33
Discharge: HOME | End: 2023-06-28
Attending: EMERGENCY MEDICINE
Payer: MEDICARE

## 2023-06-28 VITALS
OXYGEN SATURATION: 100 % | RESPIRATION RATE: 16 BRPM | TEMPERATURE: 97.5 F | SYSTOLIC BLOOD PRESSURE: 132 MMHG | HEART RATE: 94 BPM | DIASTOLIC BLOOD PRESSURE: 94 MMHG

## 2023-06-28 DIAGNOSIS — R59.0 LYMPHADENOPATHY, CERVICAL: Primary | ICD-10-CM

## 2023-06-28 PROCEDURE — 6360000002 HC RX W HCPCS

## 2023-06-28 PROCEDURE — 99284 EMERGENCY DEPT VISIT MOD MDM: CPT

## 2023-06-28 PROCEDURE — 96372 THER/PROPH/DIAG INJ SC/IM: CPT

## 2023-06-28 RX ORDER — IBUPROFEN 600 MG/1
600 TABLET ORAL 3 TIMES DAILY PRN
Qty: 21 TABLET | Refills: 0 | Status: SHIPPED | OUTPATIENT
Start: 2023-06-28 | End: 2023-07-05

## 2023-06-28 RX ORDER — KETOROLAC TROMETHAMINE 30 MG/ML
30 INJECTION, SOLUTION INTRAMUSCULAR; INTRAVENOUS ONCE
Status: COMPLETED | OUTPATIENT
Start: 2023-06-28 | End: 2023-06-28

## 2023-06-28 RX ADMIN — KETOROLAC TROMETHAMINE 30 MG: 30 INJECTION, SOLUTION INTRAMUSCULAR; INTRAVENOUS at 15:05

## 2023-06-28 ASSESSMENT — PAIN DESCRIPTION - LOCATION: LOCATION: NECK

## 2023-06-28 ASSESSMENT — PAIN SCALES - GENERAL: PAINLEVEL_OUTOF10: 6

## 2023-06-28 ASSESSMENT — PAIN - FUNCTIONAL ASSESSMENT: PAIN_FUNCTIONAL_ASSESSMENT: 0-10

## 2023-08-03 ENCOUNTER — HOSPITAL ENCOUNTER (EMERGENCY)
Age: 33
Discharge: HOME | End: 2023-08-03
Attending: EMERGENCY MEDICINE
Payer: MEDICARE

## 2023-08-03 VITALS
HEART RATE: 99 BPM | DIASTOLIC BLOOD PRESSURE: 112 MMHG | OXYGEN SATURATION: 99 % | RESPIRATION RATE: 16 BRPM | SYSTOLIC BLOOD PRESSURE: 117 MMHG | TEMPERATURE: 97.7 F

## 2023-08-03 DIAGNOSIS — B37.31 CANDIDAL VULVOVAGINITIS: Primary | ICD-10-CM

## 2023-08-03 LAB
BILIRUB UR QL STRIP: NEGATIVE
CANDIDA SPECIES: POSITIVE
CASTS #/AREA URNS LPF: NORMAL /LPF (ref 0–8)
CLARITY UR: CLEAR
COLOR UR: YELLOW
EPI CELLS #/AREA URNS HPF: NORMAL /HPF (ref 0–5)
GARDNERELLA VAGINALIS: NEGATIVE
GLUCOSE UR STRIP-MCNC: ABNORMAL MG/DL
HCG UR QL: NEGATIVE
HGB UR QL STRIP.AUTO: NEGATIVE
KETONES UR STRIP-MCNC: ABNORMAL MG/DL
LEUKOCYTE ESTERASE UR QL STRIP: ABNORMAL
NITRITE UR QL STRIP: NEGATIVE
PH UR STRIP: 5.5 [PH] (ref 5–8)
PROT UR STRIP-MCNC: NEGATIVE MG/DL
RBC #/AREA URNS HPF: NORMAL /HPF (ref 0–4)
SOURCE: ABNORMAL
SP GR UR STRIP: 1.02 (ref 1–1.03)
TRICHOMONAS: NEGATIVE
UROBILINOGEN UR STRIP-ACNC: NORMAL EU/DL (ref 0–1)
WBC #/AREA URNS HPF: NORMAL /HPF (ref 0–5)

## 2023-08-03 PROCEDURE — 86403 PARTICLE AGGLUT ANTBDY SCRN: CPT

## 2023-08-03 PROCEDURE — 87086 URINE CULTURE/COLONY COUNT: CPT

## 2023-08-03 PROCEDURE — 99283 EMERGENCY DEPT VISIT LOW MDM: CPT

## 2023-08-03 PROCEDURE — 87480 CANDIDA DNA DIR PROBE: CPT

## 2023-08-03 PROCEDURE — 87491 CHLMYD TRACH DNA AMP PROBE: CPT

## 2023-08-03 PROCEDURE — 81025 URINE PREGNANCY TEST: CPT

## 2023-08-03 PROCEDURE — 87660 TRICHOMONAS VAGIN DIR PROBE: CPT

## 2023-08-03 PROCEDURE — 6370000000 HC RX 637 (ALT 250 FOR IP): Performed by: STUDENT IN AN ORGANIZED HEALTH CARE EDUCATION/TRAINING PROGRAM

## 2023-08-03 PROCEDURE — 81001 URINALYSIS AUTO W/SCOPE: CPT

## 2023-08-03 PROCEDURE — 87510 GARDNER VAG DNA DIR PROBE: CPT

## 2023-08-03 PROCEDURE — 87591 N.GONORRHOEAE DNA AMP PROB: CPT

## 2023-08-03 RX ORDER — FLUCONAZOLE 50 MG/1
200 TABLET ORAL ONCE
Status: COMPLETED | OUTPATIENT
Start: 2023-08-03 | End: 2023-08-03

## 2023-08-03 RX ORDER — PHENAZOPYRIDINE HYDROCHLORIDE 100 MG/1
200 TABLET, FILM COATED ORAL ONCE
Status: COMPLETED | OUTPATIENT
Start: 2023-08-03 | End: 2023-08-03

## 2023-08-03 RX ADMIN — PHENAZOPYRIDINE HYDROCHLORIDE 200 MG: 100 TABLET ORAL at 13:45

## 2023-08-03 RX ADMIN — FLUCONAZOLE 200 MG: 50 TABLET ORAL at 15:15

## 2023-08-03 ASSESSMENT — PAIN DESCRIPTION - LOCATION
LOCATION: VAGINA
LOCATION: VAGINA

## 2023-08-03 ASSESSMENT — PAIN SCALES - GENERAL
PAINLEVEL_OUTOF10: 10
PAINLEVEL_OUTOF10: 10

## 2023-08-03 ASSESSMENT — ENCOUNTER SYMPTOMS
VOMITING: 0
CONSTIPATION: 0
NAUSEA: 0
ABDOMINAL PAIN: 0
DIARRHEA: 0

## 2023-08-03 ASSESSMENT — PAIN - FUNCTIONAL ASSESSMENT: PAIN_FUNCTIONAL_ASSESSMENT: 0-10

## 2023-08-03 NOTE — DISCHARGE INSTRUCTIONS
Thank you for coming to [facility]. [facility] emergency department! You were seen today for vaginal discharge, you were diagnosed with candida yeast infection. You were given diflucan. Follow up on the rest of your labs by [facility]. Follow up with your primary care doctor. If you have any worsening of symptoms or any other concerns, please return to the emergency department. We are always available!

## 2023-08-03 NOTE — ED PROVIDER NOTES
[address] ED  Emergency Department Encounter  Emergency Medicine Resident     Pt Jerzy Aguilar  MRN: [id number]  [address] [facility] [place] 1990  Date of evaluation: 8/3/23  PCP:  Luigi Varela MD  Note Started: 3:01 PM EDT      CHIEF COMPLAINT       Chief Complaint   Patient presents with    Vaginal Itching    Vaginal Discharge       HISTORY OF PRESENT ILLNESS  (Location/Symptom, Timing/Onset, Context/Setting, Quality, Duration, Modifying Factors, Severity.)      Urvashi Farnsworth is a 29 y.o. female who presents with vaginal itching and discharge ongoing for the past 2 weeks. Patient reports she is sexually active and does have a history of STI. She is currently only sexually active with women. She reports the discharge is thick, malodorous. She is having vaginal pain but no abdominal pain, nausea, vomiting, diarrhea constipation. She does report it burns when she urinates but this is due to vaginal pain. PAST MEDICAL / SURGICAL / SOCIAL / FAMILY HISTORY      has a past medical history of Asthma, Bipolar disorder, mixed ([facility]), Cannabis abuse, Chronic hypertension, Depression, Diabetes mellitus ([facility]), Obesity, and Polycystic ovarian disease. has a past surgical history that includes Tonsillectomy and adenoidectomy and [place] tooth extraction.     Social History     Socioeconomic History    Marital status: Single     Spouse name: Not on file    Number of children: Not on file    Years of education: Not on file    Highest education level: Not on file   Occupational History    Not on file   Tobacco Use    Smoking status: Every Day     Packs/day: 0.50     Years: 10.00     Pack years: 5.00     Types: Cigarettes    Smokeless tobacco: Never   Substance and Sexual Activity    Alcohol use: Yes     Comment: socially    Drug use: Yes     Types: Cocaine    Sexual activity: Not Currently     Partners: Female, Male   Other Topics Concern    Not on file   Social History Narrative    Not on file

## 2023-08-03 NOTE — ED NOTES
Patient presents to the ED for vaginal burning, discharge and discomfort for the last 3 weeks. She reports possible concerns for UTI and notes burning with urination. She reports that she is sexually active with females and notes sharing \"toys\" with her partner. She notes a history of trichomoniasis in the past. She reports \"clumpy discharge\" followed by an odor. She denies abdominal pain, diarrhea, nausea or vomiting. Patient is alert and oriented X4. Will continue to monitor.       Odilon Reid RN  08/03/23 1257

## 2023-08-03 NOTE — ED NOTES
Patient ambulated to the restroom with steady gait.  Specimen cup given      Quang Vilchis RN  08/03/23 5096

## 2023-08-04 LAB
C TRACH DNA SPEC QL PROBE+SIG AMP: NEGATIVE
MICROORGANISM SPEC CULT: ABNORMAL
MICROORGANISM SPEC CULT: ABNORMAL
N GONORRHOEA DNA SPEC QL PROBE+SIG AMP: NEGATIVE
SPECIMEN DESCRIPTION: ABNORMAL
SPECIMEN DESCRIPTION: NORMAL

## 2023-09-04 NOTE — TELEPHONE ENCOUNTER
----- Message from Chato Alcantara sent at 10/20/2022 12:06 PM EDT -----  Subject: Message to Provider    QUESTIONS  Information for Provider? pt needs to have her cpap machine supplies   reordered  ---------------------------------------------------------------------------  --------------  [address]  [phone]; OK to leave message on voicemail  ---------------------------------------------------------------------------  --------------  SCRIPT ANSWERS  Relationship to Patient?  Self
Medical supply services [phone]  Cpap supplys needed     Called supply company left on hold for 15 mins will call back
no

## 2023-09-13 ENCOUNTER — APPOINTMENT (OUTPATIENT)
Dept: CT IMAGING | Age: 33
End: 2023-09-13
Payer: MEDICAID

## 2023-09-13 ENCOUNTER — HOSPITAL ENCOUNTER (EMERGENCY)
Age: 33
Discharge: HOME | End: 2023-09-14
Attending: EMERGENCY MEDICINE
Payer: MEDICAID

## 2023-09-13 DIAGNOSIS — M54.2 NECK PAIN: Primary | ICD-10-CM

## 2023-09-13 LAB
BASOPHILS # BLD: 0.07 K/UL (ref 0–0.2)
BASOPHILS NFR BLD: 1 % (ref 0–2)
BUN BLD-MCNC: 9 MG/DL (ref 8–26)
CA-I BLD-SCNC: 1.2 MMOL/L (ref 1.15–1.33)
CHLORIDE BLD-SCNC: 107 MMOL/L (ref 98–107)
CO2 BLD CALC-SCNC: 24 MMOL/L (ref 22–30)
EGFR, POC: >60 ML/MIN/1.73M2
EOSINOPHIL # BLD: 0.1 K/UL (ref 0–0.44)
EOSINOPHILS RELATIVE PERCENT: 1 % (ref 1–4)
ERYTHROCYTE [DISTWIDTH] IN BLOOD BY AUTOMATED COUNT: 12.7 % (ref 11.8–14.4)
GLUCOSE BLD-MCNC: 291 MG/DL (ref 74–100)
HCO3 VENOUS: 24.2 MMOL/L (ref 22–29)
HCT VFR BLD AUTO: 37 % (ref 36–46)
HCT VFR BLD AUTO: 38.8 % (ref 36.3–47.1)
HGB BLD-MCNC: 12.7 G/DL (ref 11.9–15.1)
IMM GRANULOCYTES # BLD AUTO: 0.1 K/UL (ref 0–0.3)
IMM GRANULOCYTES NFR BLD: 1 %
LYMPHOCYTES NFR BLD: 3.49 K/UL (ref 1.1–3.7)
LYMPHOCYTES RELATIVE PERCENT: 37 % (ref 24–43)
MCH RBC QN AUTO: 27.7 PG (ref 25.2–33.5)
MCHC RBC AUTO-ENTMCNC: 32.7 G/DL (ref 28.4–34.8)
MCV RBC AUTO: 84.7 FL (ref 82.6–102.9)
MONOCYTES NFR BLD: 0.54 K/UL (ref 0.1–1.2)
MONOCYTES NFR BLD: 6 % (ref 3–12)
NEGATIVE BASE EXCESS, VEN: 1.9 MMOL/L (ref 0–2)
NEUTROPHILS NFR BLD: 54 % (ref 36–65)
NEUTS SEG NFR BLD: 5.11 K/UL (ref 1.5–8.1)
NRBC BLD-RTO: 0 PER 100 WBC
O2 SAT, VEN: 28.9 % (ref 60–85)
PCO2, VEN: 45.8 MM HG (ref 41–51)
PH VENOUS: 7.33 (ref 7.32–7.43)
PLATELET # BLD AUTO: 335 K/UL (ref 138–453)
PMV BLD AUTO: 9.9 FL (ref 8.1–13.5)
PO2, VEN: 20.3 MM HG (ref 30–50)
POC ANION GAP: 11 MMOL/L (ref 7–16)
POC CREATININE: 0.9 MG/DL (ref 0.51–1.19)
POC HEMOGLOBIN (CALC): 12.6 G/DL (ref 12–16)
POC LACTIC ACID: 2 MMOL/L (ref 0.56–1.39)
POTASSIUM BLD-SCNC: 3.8 MMOL/L (ref 3.5–4.5)
RBC # BLD AUTO: 4.58 M/UL (ref 3.95–5.11)
SODIUM BLD-SCNC: 141 MMOL/L (ref 138–146)
WBC OTHER # BLD: 9.4 K/UL (ref 3.5–11.3)

## 2023-09-13 PROCEDURE — 99285 EMERGENCY DEPT VISIT HI MDM: CPT | Performed by: EMERGENCY MEDICINE

## 2023-09-13 PROCEDURE — 96374 THER/PROPH/DIAG INJ IV PUSH: CPT | Performed by: EMERGENCY MEDICINE

## 2023-09-13 PROCEDURE — 85014 HEMATOCRIT: CPT

## 2023-09-13 PROCEDURE — 6360000002 HC RX W HCPCS

## 2023-09-13 PROCEDURE — 84703 CHORIONIC GONADOTROPIN ASSAY: CPT

## 2023-09-13 PROCEDURE — 80048 BASIC METABOLIC PNL TOTAL CA: CPT

## 2023-09-13 PROCEDURE — 84520 ASSAY OF UREA NITROGEN: CPT

## 2023-09-13 PROCEDURE — 83605 ASSAY OF LACTIC ACID: CPT

## 2023-09-13 PROCEDURE — 70491 CT SOFT TISSUE NECK W/DYE: CPT

## 2023-09-13 PROCEDURE — 82330 ASSAY OF CALCIUM: CPT

## 2023-09-13 PROCEDURE — 82565 ASSAY OF CREATININE: CPT

## 2023-09-13 PROCEDURE — 82803 BLOOD GASES ANY COMBINATION: CPT

## 2023-09-13 PROCEDURE — 82947 ASSAY GLUCOSE BLOOD QUANT: CPT

## 2023-09-13 PROCEDURE — 6360000004 HC RX CONTRAST MEDICATION

## 2023-09-13 PROCEDURE — 80051 ELECTROLYTE PANEL: CPT

## 2023-09-13 PROCEDURE — 85025 COMPLETE CBC W/AUTO DIFF WBC: CPT

## 2023-09-13 RX ORDER — DEXAMETHASONE SODIUM PHOSPHATE 10 MG/ML
10 INJECTION, SOLUTION INTRAMUSCULAR; INTRAVENOUS ONCE
Status: COMPLETED | OUTPATIENT
Start: 2023-09-13 | End: 2023-09-13

## 2023-09-13 RX ADMIN — IOPAMIDOL 75 ML: 755 INJECTION, SOLUTION INTRAVENOUS at 23:51

## 2023-09-13 RX ADMIN — DEXAMETHASONE SODIUM PHOSPHATE 10 MG: 10 INJECTION, SOLUTION INTRAMUSCULAR; INTRAVENOUS at 23:41

## 2023-09-13 ASSESSMENT — PAIN DESCRIPTION - DESCRIPTORS: DESCRIPTORS: SORE;ACHING;DISCOMFORT

## 2023-09-13 ASSESSMENT — PAIN DESCRIPTION - LOCATION: LOCATION: NECK

## 2023-09-13 ASSESSMENT — PAIN - FUNCTIONAL ASSESSMENT: PAIN_FUNCTIONAL_ASSESSMENT: 0-10

## 2023-09-13 ASSESSMENT — PAIN SCALES - GENERAL: PAINLEVEL_OUTOF10: 10

## 2023-09-13 ASSESSMENT — PAIN DESCRIPTION - ORIENTATION: ORIENTATION: LEFT

## 2023-09-14 VITALS
DIASTOLIC BLOOD PRESSURE: 93 MMHG | WEIGHT: 293 LBS | OXYGEN SATURATION: 100 % | SYSTOLIC BLOOD PRESSURE: 153 MMHG | HEIGHT: 69 IN | BODY MASS INDEX: 43.4 KG/M2 | RESPIRATION RATE: 16 BRPM | HEART RATE: 106 BPM | TEMPERATURE: 98.3 F

## 2023-09-14 LAB
ANION GAP SERPL CALCULATED.3IONS-SCNC: 12 MMOL/L (ref 9–17)
BUN SERPL-MCNC: 10 MG/DL (ref 6–20)
CALCIUM SERPL-MCNC: 8.8 MG/DL (ref 8.6–10.4)
CHLORIDE SERPL-SCNC: 101 MMOL/L (ref 98–107)
CO2 SERPL-SCNC: 21 MMOL/L (ref 20–31)
CREAT SERPL-MCNC: 1 MG/DL (ref 0.5–0.9)
GFR SERPL CREATININE-BSD FRML MDRD: >60 ML/MIN/1.73M2
GLUCOSE SERPL-MCNC: 305 MG/DL (ref 70–99)
HCG SERPL QL: NEGATIVE
POTASSIUM SERPL-SCNC: 3.8 MMOL/L (ref 3.7–5.3)
S PYO AG THROAT QL: NEGATIVE
SODIUM SERPL-SCNC: 134 MMOL/L (ref 135–144)
SPECIMEN SOURCE: NORMAL

## 2023-09-14 PROCEDURE — 87880 STREP A ASSAY W/OPTIC: CPT

## 2023-09-14 PROCEDURE — 6370000000 HC RX 637 (ALT 250 FOR IP)

## 2023-09-14 RX ORDER — ACETAMINOPHEN 500 MG
1000 TABLET ORAL ONCE
Status: COMPLETED | OUTPATIENT
Start: 2023-09-14 | End: 2023-09-14

## 2023-09-14 RX ORDER — IBUPROFEN 600 MG/1
600 TABLET ORAL 4 TIMES DAILY PRN
Qty: 20 TABLET | Refills: 1 | Status: SHIPPED | OUTPATIENT
Start: 2023-09-14

## 2023-09-14 RX ORDER — ACETAMINOPHEN 500 MG
500 TABLET ORAL 4 TIMES DAILY PRN
Qty: 40 TABLET | Refills: 0 | Status: SHIPPED | OUTPATIENT
Start: 2023-09-14

## 2023-09-14 RX ADMIN — ACETAMINOPHEN 1000 MG: 500 TABLET ORAL at 01:03

## 2023-09-14 ASSESSMENT — ENCOUNTER SYMPTOMS
BACK PAIN: 0
ABDOMINAL PAIN: 0
EYE PAIN: 0
NAUSEA: 0
RHINORRHEA: 0
EYE REDNESS: 0
DIARRHEA: 0
COUGH: 0
SORE THROAT: 0
VOMITING: 0
SHORTNESS OF BREATH: 0

## 2023-09-14 NOTE — DISCHARGE INSTRUCTIONS
Please return to the ED if symptoms worsen-increasing pain, difficulty swallowing, difficulty breathing or experiencing any fever. Please follow-up with your PCP in 1 week. Take Tylenol 500 Mg 4 times a day for 5 days. Take ibuprofen 600 Mg 3-4 times a day as needed for pain for 5 days.

## 2023-09-14 NOTE — ED TRIAGE NOTES
Pt to ED with c/o throat pain and left neck pain x 2 days. Pt reports pain when she swallow or laugh. Pt denies difficulty breathing, chest pain, fever and chills. Pt reports history of HTN, DM and sleep apnea.

## 2023-09-15 ENCOUNTER — HOSPITAL ENCOUNTER (EMERGENCY)
Age: 33
Discharge: HOME | End: 2023-09-15
Attending: EMERGENCY MEDICINE
Payer: MEDICAID

## 2023-09-15 VITALS
SYSTOLIC BLOOD PRESSURE: 152 MMHG | TEMPERATURE: 97.2 F | RESPIRATION RATE: 18 BRPM | HEART RATE: 82 BPM | OXYGEN SATURATION: 100 % | DIASTOLIC BLOOD PRESSURE: 99 MMHG

## 2023-09-15 DIAGNOSIS — J02.0 STREPTOCOCCAL SORE THROAT: Primary | ICD-10-CM

## 2023-09-15 PROCEDURE — 6360000002 HC RX W HCPCS: Performed by: STUDENT IN AN ORGANIZED HEALTH CARE EDUCATION/TRAINING PROGRAM

## 2023-09-15 PROCEDURE — 99283 EMERGENCY DEPT VISIT LOW MDM: CPT

## 2023-09-15 RX ORDER — DEXAMETHASONE 4 MG/1
4 TABLET ORAL 2 TIMES DAILY WITH MEALS
Qty: 1 TABLET | Refills: 0 | Status: SHIPPED | OUTPATIENT
Start: 2023-09-15 | End: 2023-09-15 | Stop reason: SDUPTHER

## 2023-09-15 RX ORDER — DEXAMETHASONE 4 MG/1
4 TABLET ORAL ONCE
Status: COMPLETED | OUTPATIENT
Start: 2023-09-15 | End: 2023-09-15

## 2023-09-15 RX ORDER — DEXAMETHASONE 4 MG/1
TABLET ORAL
Qty: 1 TABLET | Refills: 0 | Status: SHIPPED | OUTPATIENT
Start: 2023-09-15

## 2023-09-15 RX ORDER — DEXAMETHASONE 4 MG/1
4 TABLET ORAL 2 TIMES DAILY WITH MEALS
Qty: 2 TABLET | Refills: 0 | Status: SHIPPED | OUTPATIENT
Start: 2023-09-15 | End: 2023-09-15 | Stop reason: SDUPTHER

## 2023-09-15 RX ADMIN — DEXAMETHASONE 4 MG: 4 TABLET ORAL at 18:24

## 2023-09-15 NOTE — DISCHARGE INSTRUCTIONS
You have been seen in the ER today for sore throat  If you begin to experience any symptoms such as chest pain shortness of breath nausea vomiting dizziness drowsiness abdominal pain loss of consciousness or any other symptoms you find concerning please return to the ED for follow-up evaluation. If you have been given pain medication please take them only as prescribed. Do not take more medication than prescribed at any given time. Please follow-up with your primary care provider within 3-5 days for continued care, sooner if you have concerns.

## 2024-04-15 ENCOUNTER — HOSPITAL ENCOUNTER (EMERGENCY)
Age: 34
Discharge: HOME | End: 2024-04-15
Attending: EMERGENCY MEDICINE
Payer: MEDICAID

## 2024-04-15 VITALS
RESPIRATION RATE: 18 BRPM | SYSTOLIC BLOOD PRESSURE: 150 MMHG | HEART RATE: 83 BPM | BODY MASS INDEX: 46.13 KG/M2 | DIASTOLIC BLOOD PRESSURE: 102 MMHG | TEMPERATURE: 98.6 F | WEIGHT: 293 LBS | OXYGEN SATURATION: 100 %

## 2024-04-15 DIAGNOSIS — H10.45 OTHER CHRONIC ALLERGIC CONJUNCTIVITIS OF BOTH EYES: Primary | ICD-10-CM

## 2024-04-15 PROCEDURE — 99283 EMERGENCY DEPT VISIT LOW MDM: CPT

## 2024-04-15 PROCEDURE — 6370000000 HC RX 637 (ALT 250 FOR IP)

## 2024-04-15 PROCEDURE — 6360000002 HC RX W HCPCS

## 2024-04-15 RX ORDER — DEXAMETHASONE 4 MG/1
10 TABLET ORAL ONCE
Status: COMPLETED | OUTPATIENT
Start: 2024-04-15 | End: 2024-04-15

## 2024-04-15 RX ORDER — DIPHENHYDRAMINE HCL 25 MG
25 TABLET ORAL ONCE
Status: COMPLETED | OUTPATIENT
Start: 2024-04-15 | End: 2024-04-15

## 2024-04-15 RX ADMIN — DEXAMETHASONE 10 MG: 4 TABLET ORAL at 11:17

## 2024-04-15 RX ADMIN — DIPHENHYDRAMINE HYDROCHLORIDE 25 MG: 25 TABLET ORAL at 11:17

## 2024-04-15 ASSESSMENT — PAIN DESCRIPTION - DESCRIPTORS: DESCRIPTORS: DISCOMFORT

## 2024-04-15 ASSESSMENT — PAIN DESCRIPTION - ORIENTATION: ORIENTATION: LEFT

## 2024-04-15 ASSESSMENT — PAIN SCALES - GENERAL: PAINLEVEL_OUTOF10: 7

## 2024-04-15 ASSESSMENT — PAIN DESCRIPTION - LOCATION: LOCATION: EYE

## 2024-04-15 ASSESSMENT — PAIN DESCRIPTION - FREQUENCY: FREQUENCY: CONTINUOUS

## 2024-04-15 ASSESSMENT — PAIN DESCRIPTION - PAIN TYPE: TYPE: ACUTE PAIN

## 2024-04-15 ASSESSMENT — PAIN - FUNCTIONAL ASSESSMENT: PAIN_FUNCTIONAL_ASSESSMENT: 0-10

## 2024-04-15 NOTE — ED PROVIDER NOTES
[facility] ED  Emergency Department Encounter  Emergency Medicine Resident     Pt Name:Kourtney Mayberry  MRN: [id number]  Birthdate 1990  Date of evaluation: 4/15/24  PCP:  Frantz George MD  Note Started: 10:47 AM EDT      CHIEF COMPLAINT       Chief Complaint   Patient presents with    Conjunctivitis     Itching L eye       HISTORY OF PRESENT ILLNESS  (Location/Symptom, Timing/Onset, Context/Setting, Quality, Duration, Modifying Factors, Severity.)      Kourtney Mayberry is a 34 y.o. female with no significant pertinent medical history, seasonal allergies who presents with bilateral eye itchiness and drainage for 1 day.  Patient states this started this morning, patient's child had confirmed poison ivy, patient was in bed with child before knowing.  Child has poison ivy, patient is concerned for the same.  States that she may have touched the child's close, with contact with her eyes.  She presents for additional evaluation.    Has no other complaints at this time.  No shortness of breath, no nausea or vomiting, no pain on extraocular movements, mild periorbital swelling, no rash, no tenderness to the area.    PAST MEDICAL / SURGICAL / SOCIAL / FAMILY HISTORY      has a past medical history of Asthma, Bipolar disorder, mixed (HCC), Cannabis abuse, Chronic hypertension, Depression, Diabetes mellitus (HCC), Obesity, and Polycystic ovarian disease.       has a past surgical history that includes Tonsillectomy and adenoidectomy and [place] tooth extraction.      Social History     Socioeconomic History    Marital status: Single     Spouse name: Not on file    Number of children: Not on file    Years of education: Not on file    Highest education level: Not on file   Occupational History    Not on file   Tobacco Use    Smoking status: Every Day     Current packs/day: 0.50     Average packs/day: 0.5 packs/day for 10.0 years (5.0 ttl pk-yrs)     Types: Cigarettes    Smokeless tobacco: Never

## 2024-04-15 NOTE — ED NOTES
Pt complains of bilateral eye pain  Pt states eyes have watery drainage with itching  Pt ambulated in the room with a steady gait  All questions answered and needs met

## 2024-04-15 NOTE — DISCHARGE INSTRUCTIONS
You are seen here for bilateral eye itching, swelling.    We evaluated and found that you most likely have an allergic reaction, however if you do have poison ivy infection we are giving you 1 dose of steroids, and Benadryl.    You may take Benadryl at home, or Zyrtec.  Zyrtec is a medication of take once a day, but it helps treat allergies well.    Please follow-up with your primary care provider.  We recommend within 3 days.    Please return emergency department any new or worsening symptoms.  I have described these below.

## 2024-04-15 NOTE — ED PROVIDER NOTES
[facility]     Emergency Department     Faculty Attestation    I performed a history and physical examination of the patient and discussed management with the resident. I reviewed the resident´s note and agree with the documented findings and plan of care. Any areas of disagreement are noted on the chart. I was personally present for the key portions of any procedures. I have documented in the chart those procedures where I was not present during the key portions. I have reviewed the emergency nurses triage note. I agree with the chief complaint, past medical history, past surgical history, allergies, medications, social and family history as documented unless otherwise noted below. For Physician Assistant/ Nurse Practitioner cases/documentation I have personally evaluated this patient and have completed at least one if not all key elements of the E/M (history, physical exam, and MDM). Additional findings are as noted.     Demetrio Dumont MD  04/15/24 9657

## 2024-04-25 ENCOUNTER — APPOINTMENT (OUTPATIENT)
Dept: GENERAL RADIOLOGY | Age: 34
End: 2024-04-25
Payer: MEDICAID

## 2024-04-25 ENCOUNTER — HOSPITAL ENCOUNTER (EMERGENCY)
Age: 34
Discharge: HOME | End: 2024-04-25
Attending: EMERGENCY MEDICINE
Payer: MEDICAID

## 2024-04-25 VITALS
DIASTOLIC BLOOD PRESSURE: 86 MMHG | BODY MASS INDEX: 44.41 KG/M2 | TEMPERATURE: 98.7 F | HEART RATE: 97 BPM | SYSTOLIC BLOOD PRESSURE: 137 MMHG | OXYGEN SATURATION: 100 % | RESPIRATION RATE: 16 BRPM | HEIGHT: 68 IN | WEIGHT: 293 LBS

## 2024-04-25 DIAGNOSIS — R09.89 PULMONARY VASCULAR CONGESTION: ICD-10-CM

## 2024-04-25 DIAGNOSIS — E11.65 HYPERGLYCEMIA DUE TO DIABETES MELLITUS (HCC): ICD-10-CM

## 2024-04-25 DIAGNOSIS — J06.9 VIRAL URI: Primary | ICD-10-CM

## 2024-04-25 LAB
ANION GAP SERPL CALCULATED.3IONS-SCNC: 10 MMOL/L (ref 9–16)
BASOPHILS # BLD: 0.04 K/UL (ref 0–0.2)
BASOPHILS NFR BLD: 0 % (ref 0–2)
BNP SERPL-MCNC: 182 PG/ML (ref 0–300)
BUN SERPL-MCNC: 10 MG/DL (ref 6–20)
CA-I BLD-SCNC: 1.05 MMOL/L (ref 1.13–1.33)
CALCIUM SERPL-MCNC: 8.4 MG/DL (ref 8.6–10.4)
CHLORIDE SERPL-SCNC: 101 MMOL/L (ref 98–107)
CO2 SERPL-SCNC: 22 MMOL/L (ref 20–31)
CREAT SERPL-MCNC: 0.9 MG/DL (ref 0.5–0.9)
EOSINOPHIL # BLD: 0.15 K/UL (ref 0–0.44)
EOSINOPHILS RELATIVE PERCENT: 1 % (ref 1–4)
ERYTHROCYTE [DISTWIDTH] IN BLOOD BY AUTOMATED COUNT: 13.2 % (ref 11.8–14.4)
FLUAV AG SPEC QL: NEGATIVE
FLUBV AG SPEC QL: NEGATIVE
GFR SERPL CREATININE-BSD FRML MDRD: 83 ML/MIN/1.73M2
GLUCOSE SERPL-MCNC: 371 MG/DL (ref 74–99)
HCT VFR BLD AUTO: 38.6 % (ref 36.3–47.1)
HGB BLD-MCNC: 12.3 G/DL (ref 11.9–15.1)
IMM GRANULOCYTES # BLD AUTO: 0.08 K/UL (ref 0–0.3)
IMM GRANULOCYTES NFR BLD: 1 %
LYMPHOCYTES NFR BLD: 1.26 K/UL (ref 1.1–3.7)
LYMPHOCYTES RELATIVE PERCENT: 11 % (ref 24–43)
MAGNESIUM SERPL-MCNC: 1.8 MG/DL (ref 1.6–2.6)
MCH RBC QN AUTO: 27.1 PG (ref 25.2–33.5)
MCHC RBC AUTO-ENTMCNC: 31.9 G/DL (ref 28.4–34.8)
MCV RBC AUTO: 85 FL (ref 82.6–102.9)
MONOCYTES NFR BLD: 0.47 K/UL (ref 0.1–1.2)
MONOCYTES NFR BLD: 4 % (ref 3–12)
NEUTROPHILS NFR BLD: 83 % (ref 36–65)
NEUTS SEG NFR BLD: 9.62 K/UL (ref 1.5–8.1)
NRBC BLD-RTO: 0 PER 100 WBC
PLATELET # BLD AUTO: 330 K/UL (ref 138–453)
PMV BLD AUTO: 10.4 FL (ref 8.1–13.5)
POTASSIUM SERPL-SCNC: 3.8 MMOL/L (ref 3.7–5.3)
RBC # BLD AUTO: 4.54 M/UL (ref 3.95–5.11)
SARS-COV-2 RDRP RESP QL NAA+PROBE: NOT DETECTED
SODIUM SERPL-SCNC: 133 MMOL/L (ref 136–145)
SPECIMEN DESCRIPTION: NORMAL
TROPONIN I SERPL HS-MCNC: <6 NG/L (ref 0–14)
TROPONIN I SERPL HS-MCNC: <6 NG/L (ref 0–14)
WBC OTHER # BLD: 11.6 K/UL (ref 3.5–11.3)

## 2024-04-25 PROCEDURE — 83880 ASSAY OF NATRIURETIC PEPTIDE: CPT

## 2024-04-25 PROCEDURE — 71046 X-RAY EXAM CHEST 2 VIEWS: CPT

## 2024-04-25 PROCEDURE — 6360000002 HC RX W HCPCS

## 2024-04-25 PROCEDURE — 87635 SARS-COV-2 COVID-19 AMP PRB: CPT

## 2024-04-25 PROCEDURE — 84484 ASSAY OF TROPONIN QUANT: CPT

## 2024-04-25 PROCEDURE — 85025 COMPLETE CBC W/AUTO DIFF WBC: CPT

## 2024-04-25 PROCEDURE — 87804 INFLUENZA ASSAY W/OPTIC: CPT

## 2024-04-25 PROCEDURE — 80048 BASIC METABOLIC PNL TOTAL CA: CPT

## 2024-04-25 PROCEDURE — 99285 EMERGENCY DEPT VISIT HI MDM: CPT

## 2024-04-25 PROCEDURE — 96374 THER/PROPH/DIAG INJ IV PUSH: CPT

## 2024-04-25 PROCEDURE — 83735 ASSAY OF MAGNESIUM: CPT

## 2024-04-25 PROCEDURE — 82330 ASSAY OF CALCIUM: CPT

## 2024-04-25 PROCEDURE — 93005 ELECTROCARDIOGRAM TRACING: CPT

## 2024-04-25 PROCEDURE — 6370000000 HC RX 637 (ALT 250 FOR IP)

## 2024-04-25 RX ORDER — ONDANSETRON 2 MG/ML
4 INJECTION INTRAMUSCULAR; INTRAVENOUS ONCE
Status: COMPLETED | OUTPATIENT
Start: 2024-04-25 | End: 2024-04-25

## 2024-04-25 RX ORDER — CETIRIZINE HYDROCHLORIDE 10 MG/1
10 TABLET ORAL ONCE
Status: COMPLETED | OUTPATIENT
Start: 2024-04-25 | End: 2024-04-25

## 2024-04-25 RX ORDER — IBUPROFEN 400 MG/1
400 TABLET ORAL ONCE
Status: COMPLETED | OUTPATIENT
Start: 2024-04-25 | End: 2024-04-25

## 2024-04-25 RX ORDER — CALCIUM CARBONATE 500 MG/1
500 TABLET, CHEWABLE ORAL ONCE
Status: COMPLETED | OUTPATIENT
Start: 2024-04-25 | End: 2024-04-25

## 2024-04-25 RX ORDER — ACETAMINOPHEN 500 MG
1000 TABLET ORAL ONCE
Status: COMPLETED | OUTPATIENT
Start: 2024-04-25 | End: 2024-04-25

## 2024-04-25 RX ADMIN — CETIRIZINE HYDROCHLORIDE 10 MG: 10 TABLET ORAL at 10:45

## 2024-04-25 RX ADMIN — ANTACID TABLETS 500 MG: 500 TABLET, CHEWABLE ORAL at 11:32

## 2024-04-25 RX ADMIN — IBUPROFEN 400 MG: 400 TABLET, FILM COATED ORAL at 10:45

## 2024-04-25 RX ADMIN — ACETAMINOPHEN 1000 MG: 500 TABLET ORAL at 10:45

## 2024-04-25 RX ADMIN — ONDANSETRON 4 MG: 2 INJECTION INTRAMUSCULAR; INTRAVENOUS at 10:45

## 2024-04-25 ASSESSMENT — PAIN - FUNCTIONAL ASSESSMENT: PAIN_FUNCTIONAL_ASSESSMENT: 0-10

## 2024-04-25 ASSESSMENT — PAIN DESCRIPTION - PAIN TYPE: TYPE: ACUTE PAIN

## 2024-04-25 ASSESSMENT — PAIN SCALES - GENERAL: PAINLEVEL_OUTOF10: 8

## 2024-04-25 ASSESSMENT — PAIN DESCRIPTION - DESCRIPTORS: DESCRIPTORS: SHARP;ACHING

## 2024-04-25 ASSESSMENT — PAIN DESCRIPTION - LOCATION: LOCATION: CHEST;LEG

## 2024-04-25 ASSESSMENT — PAIN DESCRIPTION - ORIENTATION: ORIENTATION: RIGHT;LEFT

## 2024-04-25 ASSESSMENT — PAIN DESCRIPTION - FREQUENCY: FREQUENCY: CONTINUOUS

## 2024-04-25 NOTE — ED PROVIDER NOTES
[facility] ED     Emergency Department     Faculty Attestation        I performed a history and physical examination of the patient and discussed management with the resident. I reviewed the resident’s note and agree with the documented findings and plan of care. Any areas of disagreement are noted on the chart. I was personally present for the key portions of any procedures. I have documented in the chart those procedures where I was not present during the key portions. I have reviewed the emergency nurses triage note. I agree with the chief complaint, past medical history, past surgical history, allergies, medications, social and family history as documented unless otherwise noted below.    For mid-level providers such as nurse practitioners as well as physicians assistants:    I have personally seen and evaluated the patient.    I find the patient's history and physical exam are consistent with NP/PA documentation.  I agree with the care provided, treatment rendered, disposition, & follow-up plan.     Additional findings are as noted.    Vital Signs: BP (!) 167/105   Pulse (!) 101   Temp 98.7 °F (37.1 °C) (Oral)   Resp 24   Ht 1.727 m (5' 8\")   Wt (!) 141.3 kg (311 lb 8.2 oz)   SpO2 100%   BMI 47.36 kg/m²   PCP:  Frantz George MD    Pertinent Comments:     Patient with history of degeneration cough.  No fevers or chills on exam she is slightly tachycardic but afebrile nontoxic lungs clear resting comfortably no acute distress.      Critical Care  None          Bc Myers MD    Attending Emergency Medicine Physician            Andrew Myers MD  04/25/24 3087

## 2024-04-25 NOTE — ED NOTES
Patient presents to the ED with c/o generalized body aches, chest pain, and cough. Patient states her symptoms have been ongoing for a few days, states her symptoms have not improved. Patient does not believe she has been exposed to anyone sick recently. Patient states her chest pain is right sided, rates her pain as a 8/10. Patient is alert and oriented x4, answering questions appropriately. Patient is changed into a gown, placed on cardiac monitor, EKG done, IV established, will continue plan of care.

## 2024-04-25 NOTE — ED PROVIDER NOTES
[facility] ED  Emergency Department Encounter  Emergency Medicine Resident     Pt Name:Kourtney Mayberry  MRN: [id number]  Birthdate 1990  Date of evaluation: 4/25/24  PCP:  Frantz George MD  Note Started: 10:14 AM EDT      CHIEF COMPLAINT       Chief Complaint   Patient presents with    Cough    Nasal Congestion       HISTORY OF PRESENT ILLNESS  (Location/Symptom, Timing/Onset, Context/Setting, Quality, Duration, Modifying Factors, Severity.)      Kourtney Mayberry is a 34-year-old female who presents the ED with complaints of cough, nasal congestion as well as generalized myalgias that have been present for the last several days and not improved.  Patient is complaining of mild chest discomfort that she tried to help with some [place] this morning.  Patient has no history of ACS or myocardial infarction.  Patient does have a history of PCOS on chart review.    Patient denies any sick contacts.  She states she has had dark-colored sputum production.  She does use vape and tobacco products for smoking.  She also endorses heavy social drinking but none in the last 48 hours.  Additionally, she reports methamphetamine use in the last week as well as crack cocaine in the last 24 to 48 hours.  With clarification, patient does endorse some mid chest discomfort.    Patient denies any history of blood clots, unilateral leg swelling or acute onset shortness of breath.    Patient states she does have a history of an irregular heart rhythm, for which she takes no medications.    PAST MEDICAL / SURGICAL / SOCIAL / FAMILY HISTORY      has a past medical history of Asthma, Bipolar disorder, mixed (HCC), Cannabis abuse, Chronic hypertension, Depression, Diabetes mellitus (HCC), Obesity, and Polycystic ovarian disease.     has a past surgical history that includes Tonsillectomy and adenoidectomy and [place] tooth extraction.    Social History     Socioeconomic History    Marital status: Single      drinking but none in the last 48 hours.  Additionally, she reports methamphetamine use in the last week as well as crack cocaine in the last 24 to 48 hours.  With clarification, patient does endorse some mid chest discomfort.    Patient denies any history of blood clots, unilateral leg swelling or acute onset shortness of breath.    Patient states she does have a history of an irregular heart rhythm, for which she takes no medications.    Physical Exam: GCS 15, A&O x 4, VSS except for mild tachycardia, b/l clear rhinorrhea. Oropharynx clear. HRRR. Lungs CTA w/o rhonchi, crackles or wheezing. Abd: S, ND, NTTP. Palpable radial and DP pulses. No TTP of b/l calf pain to squeeze. Neurovascularly intact. Morbid obesity with extensive facial hair      Concern for: Covid, Influenza, viral URI, ACS/NSTEMI/STEMI, myalgias, SHON, electrolyte derangement.    Plan/Impression: Will obtain cardiopulmonary work up with COVID and Influenza swabs given patient's complaints and reported narcotic use that can cause cardiac ischemia. Will follow up on labs and imaging. Potential observation admission based on initial assessment vs discharge with close follow up. Instructed patient to stop vaping at this time to help with her body clearing any viral URI.   [AS]   1109 Pulse: 97  Recheck of vitals have improved [AS]   1115 Chest x-ray reviewed by resident.  No pneumothorax.  No mediastinal shift.  No focal consolidation.  There is R sided gissel-hilar opacification.  No obvious osseous abnormalities.  Cardiac silhouette normal.  Pending final read by radiology.   [AS]   1120 Calcium, Ionized(!): 1.05  Will give tums [AS]   1157 SARS-CoV-2, Rapid: Not Detected [AS]   1158 Flu B Antigen: NEGATIVE [AS]   1158 Flu A Antigen: NEGATIVE [AS]   1158 CXR Final FINDINGS:  Pulmonary vascular congestion.  Bibasilar hypoaeration.     There is no effusion or pneumothorax. The cardiomediastinal silhouette is stable. The osseous structures are stable.

## 2024-04-25 NOTE — DISCHARGE INSTRUCTIONS
-You were seen and evaluated by emergency medicine physicians at [facility].    -Please follow-up with your primary care physician and/or with the referrals to specialist.    -You were diagnosed with: Viral URI, hyperglycemia    - Negative for COVID and Flu. Improvement in pain and myalgias following medications  - Your glucose was elevated on labs. Please see your PCP for better management.    -Please return to the Emergency Department if you are experiencing the following symptoms acutely: Headache, fever, chills, nausea, vomiting, chest pain, shortness of breath, abdominal pain, change with urination, change with bowel movements, change in your skin/hair/nail, weakness, fatigue, altered mental status and/or any change from baseline health.    -Thank you for coming to [facility].

## 2024-04-26 LAB
EKG ATRIAL RATE: 96 BPM
EKG P AXIS: 45 DEGREES
EKG P-R INTERVAL: 138 MS
EKG Q-T INTERVAL: 342 MS
EKG QRS DURATION: 64 MS
EKG QTC CALCULATION (BAZETT): 432 MS
EKG R AXIS: 24 DEGREES
EKG T AXIS: 10 DEGREES
EKG VENTRICULAR RATE: 96 BPM

## 2024-04-26 PROCEDURE — 93010 ELECTROCARDIOGRAM REPORT: CPT | Performed by: INTERNAL MEDICINE

## 2024-09-28 ENCOUNTER — HOSPITAL ENCOUNTER (EMERGENCY)
Age: 34
Discharge: HOME | End: 2024-09-29
Attending: STUDENT IN AN ORGANIZED HEALTH CARE EDUCATION/TRAINING PROGRAM
Payer: MEDICAID

## 2024-09-28 DIAGNOSIS — J02.9 PHARYNGITIS, UNSPECIFIED ETIOLOGY: Primary | ICD-10-CM

## 2024-09-28 PROCEDURE — 99285 EMERGENCY DEPT VISIT HI MDM: CPT

## 2024-09-29 ENCOUNTER — APPOINTMENT (OUTPATIENT)
Dept: CT IMAGING | Age: 34
End: 2024-09-29
Payer: MEDICAID

## 2024-09-29 VITALS
SYSTOLIC BLOOD PRESSURE: 149 MMHG | TEMPERATURE: 98.3 F | WEIGHT: 293 LBS | OXYGEN SATURATION: 100 % | DIASTOLIC BLOOD PRESSURE: 94 MMHG | HEIGHT: 68 IN | HEART RATE: 87 BPM | RESPIRATION RATE: 16 BRPM | BODY MASS INDEX: 44.41 KG/M2

## 2024-09-29 LAB
ANION GAP SERPL CALCULATED.3IONS-SCNC: 9 MMOL/L (ref 9–16)
BASOPHILS # BLD: 0.07 K/UL (ref 0–0.2)
BASOPHILS NFR BLD: 1 % (ref 0–2)
BUN SERPL-MCNC: 13 MG/DL (ref 6–20)
CALCIUM SERPL-MCNC: 8.5 MG/DL (ref 8.6–10.4)
CHLORIDE SERPL-SCNC: 109 MMOL/L (ref 98–107)
CO2 SERPL-SCNC: 20 MMOL/L (ref 20–31)
CREAT SERPL-MCNC: 1.1 MG/DL (ref 0.5–0.9)
CRP SERPL HS-MCNC: 3.3 MG/L (ref 0–5)
EOSINOPHIL # BLD: 0.18 K/UL (ref 0–0.44)
EOSINOPHILS RELATIVE PERCENT: 2 % (ref 1–4)
ERYTHROCYTE [DISTWIDTH] IN BLOOD BY AUTOMATED COUNT: 12.7 % (ref 11.8–14.4)
GFR, ESTIMATED: 68 ML/MIN/1.73M2
GLUCOSE SERPL-MCNC: 260 MG/DL (ref 74–99)
HCT VFR BLD AUTO: 36.4 % (ref 36.3–47.1)
HGB BLD-MCNC: 11.9 G/DL (ref 11.9–15.1)
IMM GRANULOCYTES # BLD AUTO: 0.09 K/UL (ref 0–0.3)
IMM GRANULOCYTES NFR BLD: 1 %
LYMPHOCYTES NFR BLD: 3.99 K/UL (ref 1.1–3.7)
LYMPHOCYTES RELATIVE PERCENT: 37 % (ref 24–43)
MCH RBC QN AUTO: 27.5 PG (ref 25.2–33.5)
MCHC RBC AUTO-ENTMCNC: 32.7 G/DL (ref 28.4–34.8)
MCV RBC AUTO: 84.1 FL (ref 82.6–102.9)
MONOCYTES NFR BLD: 0.57 K/UL (ref 0.1–1.2)
MONOCYTES NFR BLD: 5 % (ref 3–12)
NEUTROPHILS NFR BLD: 54 % (ref 36–65)
NEUTS SEG NFR BLD: 5.76 K/UL (ref 1.5–8.1)
NRBC BLD-RTO: 0 PER 100 WBC
PLATELET # BLD AUTO: 386 K/UL (ref 138–453)
PMV BLD AUTO: 9.4 FL (ref 8.1–13.5)
POTASSIUM SERPL-SCNC: 3.8 MMOL/L (ref 3.7–5.3)
RBC # BLD AUTO: 4.33 M/UL (ref 3.95–5.11)
SODIUM SERPL-SCNC: 138 MMOL/L (ref 136–145)
WBC OTHER # BLD: 10.7 K/UL (ref 3.5–11.3)

## 2024-09-29 PROCEDURE — 96376 TX/PRO/DX INJ SAME DRUG ADON: CPT

## 2024-09-29 PROCEDURE — 85025 COMPLETE CBC W/AUTO DIFF WBC: CPT

## 2024-09-29 PROCEDURE — 70491 CT SOFT TISSUE NECK W/DYE: CPT

## 2024-09-29 PROCEDURE — 86140 C-REACTIVE PROTEIN: CPT

## 2024-09-29 PROCEDURE — 6360000004 HC RX CONTRAST MEDICATION

## 2024-09-29 PROCEDURE — 96374 THER/PROPH/DIAG INJ IV PUSH: CPT

## 2024-09-29 PROCEDURE — 6360000002 HC RX W HCPCS

## 2024-09-29 PROCEDURE — 80048 BASIC METABOLIC PNL TOTAL CA: CPT

## 2024-09-29 RX ORDER — IBUPROFEN 800 MG/1
800 TABLET, FILM COATED ORAL EVERY 8 HOURS PRN
Qty: 20 TABLET | Refills: 0 | Status: SHIPPED | OUTPATIENT
Start: 2024-09-29

## 2024-09-29 RX ORDER — KETOROLAC TROMETHAMINE 15 MG/ML
15 INJECTION, SOLUTION INTRAMUSCULAR; INTRAVENOUS ONCE
Status: COMPLETED | OUTPATIENT
Start: 2024-09-29 | End: 2024-09-29

## 2024-09-29 RX ORDER — ACETAMINOPHEN 500 MG
1000 TABLET ORAL 3 TIMES DAILY
Qty: 40 TABLET | Refills: 0 | Status: SHIPPED | OUTPATIENT
Start: 2024-09-29 | End: 2024-10-06

## 2024-09-29 RX ORDER — IOPAMIDOL 755 MG/ML
75 INJECTION, SOLUTION INTRAVASCULAR
Status: COMPLETED | OUTPATIENT
Start: 2024-09-29 | End: 2024-09-29

## 2024-09-29 RX ORDER — CETIRIZINE HYDROCHLORIDE 10 MG/1
10 TABLET ORAL DAILY
Qty: 30 TABLET | Refills: 0 | Status: SHIPPED | OUTPATIENT
Start: 2024-09-29

## 2024-09-29 RX ADMIN — IOPAMIDOL 75 ML: 755 INJECTION, SOLUTION INTRAVENOUS at 01:45

## 2024-09-29 RX ADMIN — KETOROLAC TROMETHAMINE 15 MG: 15 INJECTION, SOLUTION INTRAMUSCULAR; INTRAVENOUS at 00:30

## 2024-09-29 RX ADMIN — KETOROLAC TROMETHAMINE 15 MG: 15 INJECTION, SOLUTION INTRAMUSCULAR; INTRAVENOUS at 06:04

## 2024-09-29 ASSESSMENT — PAIN SCALES - GENERAL: PAINLEVEL_OUTOF10: 6

## 2024-09-29 NOTE — ED PROVIDER NOTES
[facility] ED  Emergency Department Encounter  Emergency Medicine Resident     Pt Name:Korutney Mayberry  MRN: [id number]  Birthdate 1990  Date of evaluation: 9/28/24  PCP:  Frantz George MD  Note Started: 11:49 PM EDT      CHIEF COMPLAINT       Chief Complaint   Patient presents with    Pharyngitis       HISTORY OF PRESENT ILLNESS  (Location/Symptom, Timing/Onset, Context/Setting, Quality, Duration, Modifying Factors, Severity.)      Kourtney Mayberry is a 34 y.o. female presenting to ED via for    CC's: Pharyngitis/left-sided neck tenderness    Patient presents to ED for chief complaint of pharyngitis/left-sided neck tenderness for 5 days.  Patient has tried Tylenol, ibuprofen, warm teas, multiple over-the-counter at home remedies without success.  Patient endorses that they are still able to drink water but is having more difficulty eating.  Patient is tolerating secretions.  Patient endorses jaw pain and is tracking around the left lateral outer mandible.  Patient endorses that they are prediabetic.  Patient also endorses that they have had their tonsils and adenoids removed bilaterally    Notable PMHx: Asthma, bipolar, cannabis use, diabetes, PCOS, tonsillectomy and adenectomy    PAST MEDICAL / SURGICAL / SOCIAL / FAMILY HISTORY      has a past medical history of Asthma, Bipolar disorder, mixed (HCC), Cannabis abuse, Chronic hypertension, Depression, Diabetes mellitus (HCC), Obesity, and Polycystic ovarian disease.       has a past surgical history that includes Tonsillectomy and adenoidectomy and [place] tooth extraction.      Social History     Socioeconomic History    Marital status: Single     Spouse name: Not on file    Number of children: Not on file    Years of education: Not on file    Highest education level: Not on file   Occupational History    Not on file   Tobacco Use    Smoking status: Every Day     Current packs/day: 0.50     Average packs/day: 0.5 packs/day for 10.0 years

## 2024-09-29 NOTE — ED PROVIDER NOTES
[facility]     Emergency Department     Faculty Attestation    I performed a history and physical examination of the patient and discussed management with the resident. I have reviewed and agree with the resident’s findings including all diagnostic interpretations, and treatment plans as written at the time of my review. Any areas of disagreement are noted on the chart. I was personally present for the key portions of any procedures. I have documented in the chart those procedures where I was not present during the key portions. For Physician Assistant/ Nurse Practitioner cases/documentation I have personally evaluated this patient and have completed at least one if not all key elements of the E/M (history, physical exam, and MDM). Additional findings are as noted.    PtName: Kourtney Mayberry  MRN: [id number]  Birthdate 1990  Date of evaluation: 9/28/24  Note Started: 11:46 PM EDT    Primary Care Physician: Frantz George MD    Brief HPI:  34-year-old female history of diabetes presents emergency department with left-sided neck pain, pain with swallowing.     Pertinent Physical Exam Findings:  Vitals:    09/28/24 2347   BP: (!) 149/94   Pulse: 87   Resp: 16   Temp: 98.3 °F (36.8 °C)   SpO2: 100%   Appears well, no acute distress.  Oropharynx is widely patent.  Tenderness along the lateral aspect of the left neck.    Medical Decision Making: Patient is a 34 y.o. female presenting to the emergency department with neck pain and pain with swallowing. The chart was reviewed for pertinent history relating to the chief complaint.  The patient appears well, no acute distress, vitals are stable.  See history and physical exam above.  I have no concern for airway compromise at this time.  Differential diagnosis includes but is not limited to MSK pain, retropharyngeal abscess, deep space neck abscess.  CT imaging of the soft tissue of the neck will be obtained to

## 2024-09-29 NOTE — ED NOTES
Pt comes to ED with c/o pharyngitis. Pt states having neck and throat pain for approx 5 days, dry cough, painful swallowing, no new allergies nor dietary changes, has tried ibuprofen + tylenol + tea without relief. Pt a/o x4, resting on stretcher, RR even and non labored, call light within reach, family at bedside, attached to spO2.

## 2024-09-29 NOTE — DISCHARGE INSTRUCTIONS
You have been evaluated in the Emergency Department at [facility] 9/29/2024     Your recommendations and if necessary prescriptions from today's visit:   -Take medication as prescribed  -Follow-up with your PCP    If you do not have a primary care provider, establish care with a provider that you can begin to regularly follow-up with.    Should you have any questions regarding your care or further treatment, please call [facility] Emergency Department at [phone].    PLEASE RETURN TO THE EMERGENCY DEPARTMENT IMMEDIATELY for   -Feeling like his throat is closing up.  Inability to eat or drink any food or water for greater than 12 to 24 hours.    OR    -Changing symptoms  -Worsening symptoms  -Unable to follow up with your primary care provider  -Any other care or concern

## 2024-09-29 NOTE — ED PROVIDER NOTES
Handoff taken on the following patient from prior Attending Physician:    Pt Name: Kourtney M Jefe    PCP:  Frantz George MD         Attestation    I was available and discussed any additional care issues that arose and coordinated the management plans with the resident(s) caring for the patient during my duty period. Any areas of disagreement with resident’s documentation of care or procedures are noted on the chart. I was personally present for the key portions of any/all procedures during my duty period. I have documented in the chart those procedures where I was not present during the key portions.    Patient is a 34-year-old female with neck pain tenderness along the lateral side of the neck awaiting CT at this time for ensuring that there is no deep space infection     Bhaskar Bennett DO  09/29/24 0231

## 2024-12-23 ENCOUNTER — HOSPITAL ENCOUNTER (EMERGENCY)
Age: 34
Discharge: HOME | End: 2024-12-23
Attending: EMERGENCY MEDICINE
Payer: COMMERCIAL

## 2024-12-23 VITALS
SYSTOLIC BLOOD PRESSURE: 137 MMHG | RESPIRATION RATE: 16 BRPM | HEIGHT: 68 IN | DIASTOLIC BLOOD PRESSURE: 66 MMHG | HEART RATE: 85 BPM | BODY MASS INDEX: 44.41 KG/M2 | WEIGHT: 293 LBS | OXYGEN SATURATION: 100 % | TEMPERATURE: 98.1 F

## 2024-12-23 DIAGNOSIS — S90.822A FRICTION BLISTERS OF SOLE OF LEFT FOOT, INITIAL ENCOUNTER: Primary | ICD-10-CM

## 2024-12-23 PROCEDURE — 99283 EMERGENCY DEPT VISIT LOW MDM: CPT

## 2024-12-23 RX ORDER — CLOTRIMAZOLE 1 %
CREAM (GRAM) TOPICAL
Qty: 1 EACH | Refills: 0 | Status: SHIPPED | OUTPATIENT
Start: 2024-12-23 | End: 2024-12-30

## 2024-12-23 RX ORDER — IBUPROFEN 600 MG/1
600 TABLET, FILM COATED ORAL EVERY 6 HOURS PRN
Qty: 28 TABLET | Refills: 0 | Status: SHIPPED | OUTPATIENT
Start: 2024-12-23 | End: 2024-12-30

## 2024-12-23 ASSESSMENT — PAIN - FUNCTIONAL ASSESSMENT: PAIN_FUNCTIONAL_ASSESSMENT: 0-10

## 2024-12-23 ASSESSMENT — ENCOUNTER SYMPTOMS: RESPIRATORY NEGATIVE: 1

## 2024-12-23 ASSESSMENT — PAIN DESCRIPTION - LOCATION: LOCATION: FOOT

## 2024-12-23 ASSESSMENT — PAIN SCALES - GENERAL: PAINLEVEL_OUTOF10: 10

## 2024-12-23 NOTE — ED PROVIDER NOTES
are overlying a joint are causing significant discomfort.  I will wrap the area for compression and comfort.  With the pruritus and scaling of the skin, also consider athlete's foot/atypical infection.  No bony tenderness.  Patient is able to ambulate.    Plan:  Clotrimazole cream  Wrap area to prevent further friction  Podiatry follow-up    Medical Decision Making  Risk  Prescription drug management.          Liseth Mckenna MD   Attending Emergency Physician    (Please note that portions of this note were completed with a voice recognition program. Efforts were made to edit the dictations but occasionally words are mis-transcribed.)            Liseth Mckenna MD  12/23/24 [id number]    
(VISTARIL) 50 MG capsule Take 1 capsule by mouth 3 times daily as needed 1/20/21   Provider, MD Dio   hydrocortisone valerate (WESTCORT) 0.2 % ointment Apply topically daily. 11/13/20   Nikia Roca DPM   FLUoxetine (PROZAC) 40 MG capsule Take 1 capsule by mouth daily 1/8/17   Amanda Fairbanks MD   ziprasidone (GEODON) 40 MG capsule Take 1 capsule by mouth 2 times daily (with meals) 1/8/17   Amanda Fairbanks MD   traZODone (DESYREL) 50 MG tablet Take 1 tablet by mouth nightly as needed for Sleep 1/8/17   Amanda Fairbanks MD         REVIEW OF SYSTEMS       Review of Systems   Constitutional: Negative.    HENT: Negative.     Respiratory: Negative.     Cardiovascular: Negative.    Musculoskeletal: Negative.    Skin:  Positive for wound (One closed and one open healing blister located at the left medial plantar surface of the foot. No warmth, erythema, edema. Sensation intact throughout. Able to move toes and ankle.).       PHYSICAL EXAM      INITIAL VITALS:   /66   Pulse 85   Temp 98.1 °F (36.7 °C)   Resp 16   Ht 1.727 m (5' 8\")   Wt (!) 146.5 kg (323 lb)   SpO2 100%   BMI 49.11 kg/m²     Physical Exam      DDX/DIAGNOSTIC RESULTS / EMERGENCY DEPARTMENT COURSE / MDM     Medical Decision Making  Risk  Prescription drug management.        EMERGENCY DEPARTMENT COURSE:      ED Course as of 12/23/24 1702   Mon Dec 23, 2024   1701 Plantar blisters dressed with gauze and coban [EG]      ED Course User Index  [EG] Layla Quan MD       CONSULTS:  None    FINAL IMPRESSION      1. Friction blisters of sole of left foot, initial encounter          DISPOSITION / PLAN     DISPOSITION Decision To Discharge 12/23/2024 05:01:22 PM   DISPOSITION CONDITION Stable           PATIENT REFERRED TO:  Frantz George MD  [address] [place] [id number]  [phone]    Schedule an appointment as soon as possible for a visit   As needed, If symptoms worsen    [facility] Emergency Department  [address] [place] [address]

## 2024-12-23 NOTE — ED NOTES
Patient presents to ED for blister to bottom of left foot  States ongoing issue, they form then pop  Patient a/o x 4 with NAD noted

## 2025-01-28 NOTE — PROGRESS NOTES
Patient instructed to remove shoes and socks and instructed to sit in exam chair.  Current PCP is Frantz George MD and date of last visit was [id number].   Do you have a follow up visit scheduled?  No  If yes, the date is unknown

## 2025-02-03 ENCOUNTER — OFFICE VISIT (OUTPATIENT)
Dept: PODIATRY | Age: 35
End: 2025-02-03

## 2025-02-03 VITALS
DIASTOLIC BLOOD PRESSURE: 92 MMHG | WEIGHT: 293 LBS | SYSTOLIC BLOOD PRESSURE: 144 MMHG | HEIGHT: 68 IN | BODY MASS INDEX: 44.41 KG/M2

## 2025-02-03 DIAGNOSIS — M79.672 BILATERAL FOOT PAIN: ICD-10-CM

## 2025-02-03 DIAGNOSIS — E66.9 TYPE 2 DIABETES MELLITUS WITH OBESITY (HCC): Primary | ICD-10-CM

## 2025-02-03 DIAGNOSIS — E11.69 TYPE 2 DIABETES MELLITUS WITH OBESITY (HCC): Primary | ICD-10-CM

## 2025-02-03 DIAGNOSIS — B35.3 TINEA PEDIS OF BOTH FEET: ICD-10-CM

## 2025-02-03 DIAGNOSIS — M79.671 BILATERAL FOOT PAIN: ICD-10-CM

## 2025-02-03 RX ORDER — CLOTRIMAZOLE AND BETAMETHASONE DIPROPIONATE 10; .64 MG/G; MG/G
CREAM TOPICAL
Qty: 45 G | Refills: 2 | Status: SHIPPED | OUTPATIENT
Start: 2025-02-03

## 2025-02-06 NOTE — PROGRESS NOTES
.[email or web address]    [facility]  [address]   [address] [place] [address]  Tel: [phone]   Fax: [phone]    Subjective     CC: Blisters on both feet    HPI:  Kourtney Mayberry is a 35 y.o. year old female who presents to clinic today due to dermatological changes on both of her feet.  Patient states that her feet have been growing blisters for quite some time now, states that she has tried over-the-counter creams without success.  Patient states that her feet are both painful while ambulating, states that she is a diabetic.  Patient's last hemoglobin A1c was 7.0 in December 2022.  Patient forms extensive calluses on both feet..  Patient denies all other pedal complaints  Primary care physician is Frantz George MD.    ROS:    Constitutional: Denies nausea, vomiting, fever, chills.  Neurologic: Denies numbness, tingling, and burning in the feet.    Vascular: Denies symptoms of lower extremity claudication.    Skin: Denies open wounds.  Otherwise negative except as noted in the HPI.     PMH:  Past Medical History:   Diagnosis Date    Asthma     Has not needed inhaler since 2009    Bipolar disorder, mixed ([facility])     Cannabis abuse 12/31/2016    Chronic hypertension 11/25/2019    Depression     Diabetes mellitus ([facility]) 11/20/2013    borderline    Obesity 08/01/2013    Polycystic ovarian disease        Surgical History:   Past Surgical History:   Procedure Laterality Date    TONSILLECTOMY AND ADENOIDECTOMY      WISDOM TOOTH EXTRACTION         Social History:  Social History     Tobacco Use    Smoking status: Every Day     Current packs/day: 0.50     Average packs/day: 0.5 packs/day for 10.0 years (5.0 ttl pk-yrs)     Types: Cigarettes    Smokeless tobacco: Never   Substance Use Topics    Alcohol use: Yes     Comment: socially    Drug use: Yes     Types: Cocaine       Medications:  Prior to Admission medications    Medication Sig Start Date End Date Taking? Authorizing Provider